# Patient Record
Sex: MALE | Race: WHITE | NOT HISPANIC OR LATINO | Employment: OTHER | URBAN - METROPOLITAN AREA
[De-identification: names, ages, dates, MRNs, and addresses within clinical notes are randomized per-mention and may not be internally consistent; named-entity substitution may affect disease eponyms.]

---

## 2017-05-03 ENCOUNTER — OFFICE VISIT (OUTPATIENT)
Dept: ORTHOPEDIC SURGERY | Facility: CLINIC | Age: 77
End: 2017-05-03

## 2017-05-03 DIAGNOSIS — M17.11 PRIMARY OSTEOARTHRITIS OF RIGHT KNEE: ICD-10-CM

## 2017-05-03 DIAGNOSIS — R52 PAIN: Primary | ICD-10-CM

## 2017-05-03 PROCEDURE — 20610 DRAIN/INJ JOINT/BURSA W/O US: CPT | Performed by: ORTHOPAEDIC SURGERY

## 2017-05-03 PROCEDURE — 99214 OFFICE O/P EST MOD 30 MIN: CPT | Performed by: ORTHOPAEDIC SURGERY

## 2017-05-03 RX ORDER — SERTRALINE HYDROCHLORIDE 25 MG/1
TABLET, FILM COATED ORAL
Refills: 0 | COMMUNITY
Start: 2017-03-20 | End: 2017-06-13

## 2017-05-03 RX ORDER — BETAMETHASONE SODIUM PHOSPHATE AND BETAMETHASONE ACETATE 3; 3 MG/ML; MG/ML
6 INJECTION, SUSPENSION INTRA-ARTICULAR; INTRALESIONAL; INTRAMUSCULAR; SOFT TISSUE
Status: COMPLETED | OUTPATIENT
Start: 2017-05-03 | End: 2017-05-03

## 2017-05-03 RX ORDER — LIDOCAINE HYDROCHLORIDE 10 MG/ML
8 INJECTION, SOLUTION INFILTRATION; PERINEURAL
Status: COMPLETED | OUTPATIENT
Start: 2017-05-03 | End: 2017-05-03

## 2017-05-03 RX ORDER — DONEPEZIL HYDROCHLORIDE 10 MG/1
TABLET, FILM COATED ORAL
Refills: 5 | COMMUNITY
Start: 2017-04-07 | End: 2017-06-13

## 2017-05-03 RX ADMIN — BETAMETHASONE SODIUM PHOSPHATE AND BETAMETHASONE ACETATE 6 MG: 3; 3 INJECTION, SUSPENSION INTRA-ARTICULAR; INTRALESIONAL; INTRAMUSCULAR; SOFT TISSUE at 09:18

## 2017-05-03 RX ADMIN — LIDOCAINE HYDROCHLORIDE 8 ML: 10 INJECTION, SOLUTION INFILTRATION; PERINEURAL at 09:18

## 2017-05-18 ENCOUNTER — ANESTHESIA EVENT (OUTPATIENT)
Dept: PERIOP | Facility: HOSPITAL | Age: 77
End: 2017-05-18

## 2017-05-19 ENCOUNTER — ON CAMPUS - OUTPATIENT (OUTPATIENT)
Dept: URBAN - METROPOLITAN AREA HOSPITAL 28 | Facility: HOSPITAL | Age: 77
End: 2017-05-19

## 2017-05-19 ENCOUNTER — HOSPITAL ENCOUNTER (OUTPATIENT)
Facility: HOSPITAL | Age: 77
Setting detail: HOSPITAL OUTPATIENT SURGERY
Discharge: HOME | End: 2017-05-19
Attending: INTERNAL MEDICINE | Admitting: INTERNAL MEDICINE

## 2017-05-19 ENCOUNTER — PREP FOR SURGERY (OUTPATIENT)
Dept: GASTROENTEROLOGY | Facility: HOSPITAL | Age: 77
End: 2017-05-19

## 2017-05-19 ENCOUNTER — ANESTHESIA (OUTPATIENT)
Dept: PERIOP | Facility: HOSPITAL | Age: 77
End: 2017-05-19

## 2017-05-19 VITALS
WEIGHT: 196.5 LBS | TEMPERATURE: 97 F | DIASTOLIC BLOOD PRESSURE: 76 MMHG | RESPIRATION RATE: 16 BRPM | HEIGHT: 71 IN | BODY MASS INDEX: 27.51 KG/M2 | OXYGEN SATURATION: 98 % | HEART RATE: 62 BPM | SYSTOLIC BLOOD PRESSURE: 112 MMHG

## 2017-05-19 DIAGNOSIS — K55.20 ANGIODYSPLASIA OF COLON WITHOUT HEMORRHAGE: ICD-10-CM

## 2017-05-19 DIAGNOSIS — K57.30 DIVERTICULOSIS OF LARGE INTESTINE WITHOUT PERFORATION OR ABS: ICD-10-CM

## 2017-05-19 DIAGNOSIS — K92.1 MELENA: ICD-10-CM

## 2017-05-19 DIAGNOSIS — M17.11 PRIMARY OSTEOARTHRITIS OF RIGHT KNEE: Primary | ICD-10-CM

## 2017-05-19 LAB — GLUCOSE BLDC GLUCOMTR-MCNC: 133 MG/DL (ref 70–130)

## 2017-05-19 PROCEDURE — 25010000002 PROPOFOL 10 MG/ML EMULSION: Performed by: NURSE ANESTHETIST, CERTIFIED REGISTERED

## 2017-05-19 PROCEDURE — 45378 DIAGNOSTIC COLONOSCOPY: CPT

## 2017-05-19 PROCEDURE — 82962 GLUCOSE BLOOD TEST: CPT

## 2017-05-19 RX ORDER — LIDOCAINE HYDROCHLORIDE 10 MG/ML
INJECTION, SOLUTION EPIDURAL; INFILTRATION; INTRACAUDAL; PERINEURAL
Status: COMPLETED
Start: 2017-05-19 | End: 2017-05-19

## 2017-05-19 RX ORDER — PROPOFOL 10 MG/ML
VIAL (ML) INTRAVENOUS AS NEEDED
Status: DISCONTINUED | OUTPATIENT
Start: 2017-05-19 | End: 2017-05-19 | Stop reason: SURG

## 2017-05-19 RX ORDER — SODIUM CHLORIDE, SODIUM LACTATE, POTASSIUM CHLORIDE, CALCIUM CHLORIDE 600; 310; 30; 20 MG/100ML; MG/100ML; MG/100ML; MG/100ML
9 INJECTION, SOLUTION INTRAVENOUS CONTINUOUS
Status: DISCONTINUED | OUTPATIENT
Start: 2017-05-19 | End: 2017-05-19 | Stop reason: HOSPADM

## 2017-05-19 RX ORDER — LIDOCAINE HYDROCHLORIDE 10 MG/ML
0.5 INJECTION, SOLUTION EPIDURAL; INFILTRATION; INTRACAUDAL; PERINEURAL ONCE AS NEEDED
Status: COMPLETED | OUTPATIENT
Start: 2017-05-19 | End: 2017-05-19

## 2017-05-19 RX ORDER — MAGNESIUM HYDROXIDE 1200 MG/15ML
LIQUID ORAL AS NEEDED
Status: DISCONTINUED | OUTPATIENT
Start: 2017-05-19 | End: 2017-05-19 | Stop reason: HOSPADM

## 2017-05-19 RX ORDER — LIDOCAINE HYDROCHLORIDE 20 MG/ML
INJECTION, SOLUTION INFILTRATION; PERINEURAL AS NEEDED
Status: DISCONTINUED | OUTPATIENT
Start: 2017-05-19 | End: 2017-05-19 | Stop reason: SURG

## 2017-05-19 RX ORDER — SODIUM CHLORIDE 0.9 % (FLUSH) 0.9 %
1-10 SYRINGE (ML) INJECTION AS NEEDED
Status: CANCELLED | OUTPATIENT
Start: 2017-05-19

## 2017-05-19 RX ORDER — GLYCOPYRROLATE 0.2 MG/ML
INJECTION INTRAMUSCULAR; INTRAVENOUS AS NEEDED
Status: DISCONTINUED | OUTPATIENT
Start: 2017-05-19 | End: 2017-05-19 | Stop reason: SURG

## 2017-05-19 RX ORDER — SODIUM CHLORIDE 0.9 % (FLUSH) 0.9 %
1-10 SYRINGE (ML) INJECTION AS NEEDED
Status: DISCONTINUED | OUTPATIENT
Start: 2017-05-19 | End: 2017-05-19 | Stop reason: HOSPADM

## 2017-05-19 RX ADMIN — PROPOFOL 50 MG: 10 INJECTION, EMULSION INTRAVENOUS at 09:56

## 2017-05-19 RX ADMIN — SODIUM CHLORIDE, POTASSIUM CHLORIDE, SODIUM LACTATE AND CALCIUM CHLORIDE: 600; 310; 30; 20 INJECTION, SOLUTION INTRAVENOUS at 09:16

## 2017-05-19 RX ADMIN — PROPOFOL 50 MG: 10 INJECTION, EMULSION INTRAVENOUS at 10:01

## 2017-05-19 RX ADMIN — PROPOFOL 20 MG: 10 INJECTION, EMULSION INTRAVENOUS at 10:06

## 2017-05-19 RX ADMIN — SODIUM CHLORIDE, POTASSIUM CHLORIDE, SODIUM LACTATE AND CALCIUM CHLORIDE 9 ML/HR: 600; 310; 30; 20 INJECTION, SOLUTION INTRAVENOUS at 09:20

## 2017-05-19 RX ADMIN — LIDOCAINE HYDROCHLORIDE 100 MG: 20 INJECTION, SOLUTION INFILTRATION; PERINEURAL at 09:53

## 2017-05-19 RX ADMIN — GLYCOPYRROLATE 0.2 MG: 0.2 INJECTION INTRAMUSCULAR; INTRAVENOUS at 09:53

## 2017-05-19 RX ADMIN — LIDOCAINE HYDROCHLORIDE 0.5 ML: 10 INJECTION, SOLUTION EPIDURAL; INFILTRATION; INTRACAUDAL; PERINEURAL at 09:21

## 2017-05-19 RX ADMIN — PROPOFOL 100 MG: 10 INJECTION, EMULSION INTRAVENOUS at 09:54

## 2017-06-01 ENCOUNTER — OFFICE VISIT (OUTPATIENT)
Dept: ORTHOPEDIC SURGERY | Facility: CLINIC | Age: 77
End: 2017-06-01

## 2017-06-01 DIAGNOSIS — R52 PAIN: Primary | ICD-10-CM

## 2017-06-01 DIAGNOSIS — M17.11 PRIMARY OSTEOARTHRITIS OF RIGHT KNEE: ICD-10-CM

## 2017-06-01 PROCEDURE — 73565 X-RAY EXAM OF KNEES: CPT | Performed by: ORTHOPAEDIC SURGERY

## 2017-06-01 PROCEDURE — 99214 OFFICE O/P EST MOD 30 MIN: CPT | Performed by: ORTHOPAEDIC SURGERY

## 2017-06-01 NOTE — PROGRESS NOTES
Subjective: Right knee pain     Patient ID: Zach Gutierrez is a 76 y.o. male.    Chief Complaint:    History of Present Illness 76-year-old male is seen for recurrent and persistent right knee pain that he's had for many years that is remained refractory to treatment to date which has consisted of anti-inflammatories cortisone injection viscus supplement injections modification of activity and bracing.  Patient is seen today to discuss total joint replacement.       Social History     Occupational History   • Not on file.     Social History Main Topics   • Smoking status: Never Smoker   • Smokeless tobacco: Never Used   • Alcohol use No   • Drug use: No   • Sexual activity: Defer      Review of Systems   Constitutional: Negative for chills, diaphoresis, fever and unexpected weight change.   HENT: Negative for hearing loss, nosebleeds, sore throat and tinnitus.    Eyes: Negative for pain and visual disturbance.   Respiratory: Negative for cough, shortness of breath and wheezing.    Cardiovascular: Negative for chest pain and palpitations.   Gastrointestinal: Negative for abdominal pain, diarrhea, nausea and vomiting.   Endocrine: Negative for cold intolerance, heat intolerance and polydipsia.   Genitourinary: Negative for difficulty urinating, dysuria and hematuria.   Musculoskeletal: Positive for arthralgias and myalgias. Negative for joint swelling.   Skin: Negative for rash and wound.   Allergic/Immunologic: Negative for environmental allergies.   Neurological: Negative for dizziness, syncope and numbness.   Hematological: Does not bruise/bleed easily.   Psychiatric/Behavioral: Negative for dysphoric mood and sleep disturbance. The patient is not nervous/anxious.    All other systems reviewed and are negative.        Past Medical History:   Diagnosis Date   • Arthritis    • Diabetes    • Knee sprain    • ZENA (obstructive sleep apnea)      Past Surgical History:   Procedure Laterality Date   • COLONOSCOPY N/A  5/19/2017    Procedure: COLONOSCOPY;  Surgeon: Srikanth Payan MD;  Location: [facility];  Service:    • HERNIA REPAIR Bilateral    • SHOULDER SURGERY  2007     No family history on file.      Objective:  There were no vitals filed for this visit.  There were no vitals filed for this visit.  There is no height or weight on file to calculate BMI.       Ortho Exam  bilateral knee x-rays are done to evaluate his complaints as the right knee is more symptomatic than the left an x-ray the right knee shows end-stage degenerative arthritis in all 3 compartments.  The left knee shows moderate degenerative changes particularly in the patellofemoral medial compartment no prior x-rays available at this time for comparison.  On exam is alert and oriented ×3.  Head is eyes nose and throat are normocephalic pupils are equally round and reactive to light sclerae clear.  The right leg shows no motor deficit good distal pulses.  No sensory loss.  The skin is cool to touch.  The knee has no effusion but there is moderate to severe crepitus with range of motion which is 0-125°.  He does have a very mild varus deformity.  His calf is nontender and supple to negative Homans.  His quad is +5 over 5.  He's been taken nonsteroidal anti-inflammatories with no GI side effects but no decrease in his pain and/or discomfort.    Assessment:       1. Pain    2. Primary osteoarthritis of right knee          Plan:      reviewed the x-rays findings with the patient and his wife.  He also had a lengthy discussion regarding total knee replacement.  Had models in the room showing an arthritic knee and in total knee.  We discussed the risks which include but not limited to an infection particularly diabetic as he is wearing and requiring multiple operations including implant removal to eradicate infection, foot drop, vascular injury, periprosthetic fracture, the need for revision surgery, and the fact that 10-15% still a pain and discomfort  despite a well implanted knee.  Emphasize even his blood sugar tight control to help decrease the risk of infection.  Discussed the rehabilitation which is 3 months to a year and the importance of physical therapy.  He understands and wishes proceed ASAP.  Surgery tenderness scheduled for June 20 pending prep evaluation.  Patient was given literature to take him to review.      Work Status:    LATRELL query complete.    Orders:  Orders Placed This Encounter   Procedures   • XR Knee 3 View Bilateral       Medications:  No orders of the defined types were placed in this encounter.      Followup:  Return in about 18 days (around 6/19/2017).          Dragon transcription disclaimer     Much of this encounter note is an electronic transcription/translation of spoken language to printed text. The electronic translation of spoken language may permit erroneous, or at times, nonsensical words or phrases to be inadvertently transcribed. Although I have reviewed the note for such errors, some may still exist.

## 2017-06-02 RX ORDER — ACETAMINOPHEN 10 MG/ML
1000 INJECTION, SOLUTION INTRAVENOUS ONCE
Status: CANCELLED | OUTPATIENT
Start: 2017-06-20

## 2017-06-02 RX ORDER — MELOXICAM 15 MG/1
15 TABLET ORAL ONCE
Status: CANCELLED | OUTPATIENT
Start: 2017-06-20

## 2017-06-02 RX ORDER — OXYCODONE HCL 10 MG/1
10 TABLET, FILM COATED, EXTENDED RELEASE ORAL ONCE
Status: CANCELLED | OUTPATIENT
Start: 2017-06-20

## 2017-06-02 RX ORDER — PREGABALIN 75 MG/1
150 CAPSULE ORAL ONCE
Status: CANCELLED | OUTPATIENT
Start: 2017-06-02 | End: 2017-06-02

## 2017-06-02 RX ORDER — CEFAZOLIN SODIUM 2 G/100ML
2 INJECTION, SOLUTION INTRAVENOUS ONCE
Status: CANCELLED | OUTPATIENT
Start: 2017-06-20

## 2017-06-12 ENCOUNTER — PREP FOR SURGERY (OUTPATIENT)
Dept: OTHER | Facility: HOSPITAL | Age: 77
End: 2017-06-12

## 2017-06-12 DIAGNOSIS — E23.2 DIABETES INSIPIDUS (HCC): Primary | ICD-10-CM

## 2017-06-13 ENCOUNTER — APPOINTMENT (OUTPATIENT)
Dept: PREADMISSION TESTING | Facility: HOSPITAL | Age: 77
End: 2017-06-13

## 2017-06-13 ENCOUNTER — HOSPITAL ENCOUNTER (OUTPATIENT)
Dept: GENERAL RADIOLOGY | Facility: HOSPITAL | Age: 77
Discharge: HOME | End: 2017-06-13
Admitting: ORTHOPAEDIC SURGERY

## 2017-06-13 VITALS
WEIGHT: 202.2 LBS | DIASTOLIC BLOOD PRESSURE: 71 MMHG | BODY MASS INDEX: 28.31 KG/M2 | HEIGHT: 71 IN | OXYGEN SATURATION: 94 % | HEART RATE: 84 BPM | RESPIRATION RATE: 16 BRPM | SYSTOLIC BLOOD PRESSURE: 122 MMHG

## 2017-06-13 DIAGNOSIS — E23.2 DIABETES INSIPIDUS (HCC): Primary | ICD-10-CM

## 2017-06-13 DIAGNOSIS — E23.2 DIABETES INSIPIDUS (HCC): ICD-10-CM

## 2017-06-13 LAB
ABO GROUP BLD: NORMAL
ABO GROUP BLD: NORMAL
ANION GAP SERPL CALCULATED.3IONS-SCNC: 15.2 MMOL/L
BACTERIA UR QL AUTO: ABNORMAL /HPF
BASOPHILS # BLD AUTO: 0.03 10*3/MM3 (ref 0–0.2)
BASOPHILS NFR BLD AUTO: 0.4 % (ref 0–2)
BILIRUB UR QL STRIP: ABNORMAL
BLD GP AB SCN SERPL QL: NEGATIVE
BUN BLD-MCNC: 19 MG/DL (ref 8–23)
BUN/CREAT SERPL: 27.1 (ref 7–25)
CALCIUM SPEC-SCNC: 8.2 MG/DL (ref 8.8–10.5)
CHLORIDE SERPL-SCNC: 101 MMOL/L (ref 98–107)
CLARITY UR: CLEAR
CO2 SERPL-SCNC: 20.8 MMOL/L (ref 22–29)
COLOR UR: YELLOW
CREAT BLD-MCNC: 0.7 MG/DL (ref 0.76–1.27)
DEPRECATED RDW RBC AUTO: 39.7 FL (ref 37–54)
EOSINOPHIL # BLD AUTO: 0.02 10*3/MM3 (ref 0.1–0.3)
EOSINOPHIL NFR BLD AUTO: 0.3 % (ref 0–4)
ERYTHROCYTE [DISTWIDTH] IN BLOOD BY AUTOMATED COUNT: 12.2 % (ref 11.5–14.5)
GFR SERPL CREATININE-BSD FRML MDRD: 110 ML/MIN/1.73
GLUCOSE BLD-MCNC: 149 MG/DL (ref 65–99)
GLUCOSE UR STRIP-MCNC: NEGATIVE MG/DL
HCT VFR BLD AUTO: 41.1 % (ref 42–52)
HGB BLD-MCNC: 14.7 G/DL (ref 14–18)
HGB UR QL STRIP.AUTO: ABNORMAL
HYALINE CASTS UR QL AUTO: ABNORMAL /LPF
IMM GRANULOCYTES # BLD: 0.02 10*3/MM3 (ref 0–0.03)
IMM GRANULOCYTES NFR BLD: 0.3 % (ref 0–0.5)
INR PPP: 1.03 (ref 0.9–1.1)
KETONES UR QL STRIP: ABNORMAL
LEUKOCYTE ESTERASE UR QL STRIP.AUTO: NEGATIVE
LYMPHOCYTES # BLD AUTO: 0.38 10*3/MM3 (ref 0.6–4.8)
LYMPHOCYTES NFR BLD AUTO: 4.9 % (ref 20–45)
MCH RBC QN AUTO: 32.1 PG (ref 27–31)
MCHC RBC AUTO-ENTMCNC: 35.8 G/DL (ref 31–37)
MCV RBC AUTO: 89.7 FL (ref 80–94)
MONOCYTES # BLD AUTO: 0.29 10*3/MM3 (ref 0–1)
MONOCYTES NFR BLD AUTO: 3.8 % (ref 3–8)
MUCOUS THREADS URNS QL MICRO: ABNORMAL /HPF
NEUTROPHILS # BLD AUTO: 6.94 10*3/MM3 (ref 1.5–8.3)
NEUTROPHILS NFR BLD AUTO: 90.3 % (ref 45–70)
NITRITE UR QL STRIP: NEGATIVE
NRBC BLD MANUAL-RTO: 0 /100 WBC (ref 0–0)
PH UR STRIP.AUTO: <=5 [PH] (ref 4.5–8)
PLATELET # BLD AUTO: 211 10*3/MM3 (ref 140–500)
PMV BLD AUTO: 9.1 FL (ref 7.4–10.4)
POTASSIUM BLD-SCNC: 3.9 MMOL/L (ref 3.5–5.2)
PROT UR QL STRIP: ABNORMAL
PROTHROMBIN TIME: 13.5 SECONDS (ref 12.1–15)
RBC # BLD AUTO: 4.58 10*6/MM3 (ref 4.7–6.1)
RBC # UR: ABNORMAL /HPF
REF LAB TEST METHOD: ABNORMAL
RH BLD: POSITIVE
RH BLD: POSITIVE
SODIUM BLD-SCNC: 137 MMOL/L (ref 136–145)
SP GR UR STRIP: 1.03 (ref 1–1.03)
SQUAMOUS #/AREA URNS HPF: ABNORMAL /HPF
UROBILINOGEN UR QL STRIP: ABNORMAL
WBC NRBC COR # BLD: 7.68 10*3/MM3 (ref 4.8–10.8)
WBC UR QL AUTO: ABNORMAL /HPF

## 2017-06-13 PROCEDURE — 85610 PROTHROMBIN TIME: CPT | Performed by: ORTHOPAEDIC SURGERY

## 2017-06-13 PROCEDURE — 86900 BLOOD TYPING SEROLOGIC ABO: CPT

## 2017-06-13 PROCEDURE — 86901 BLOOD TYPING SEROLOGIC RH(D): CPT | Performed by: ORTHOPAEDIC SURGERY

## 2017-06-13 PROCEDURE — 80048 BASIC METABOLIC PNL TOTAL CA: CPT | Performed by: ORTHOPAEDIC SURGERY

## 2017-06-13 PROCEDURE — 71020 HC CHEST PA AND LATERAL: CPT

## 2017-06-13 PROCEDURE — 86850 RBC ANTIBODY SCREEN: CPT | Performed by: ORTHOPAEDIC SURGERY

## 2017-06-13 PROCEDURE — 93005 ELECTROCARDIOGRAM TRACING: CPT

## 2017-06-13 PROCEDURE — 81001 URINALYSIS AUTO W/SCOPE: CPT | Performed by: ORTHOPAEDIC SURGERY

## 2017-06-13 PROCEDURE — 86901 BLOOD TYPING SEROLOGIC RH(D): CPT

## 2017-06-13 PROCEDURE — 85025 COMPLETE CBC W/AUTO DIFF WBC: CPT | Performed by: ORTHOPAEDIC SURGERY

## 2017-06-13 PROCEDURE — 86900 BLOOD TYPING SEROLOGIC ABO: CPT | Performed by: ORTHOPAEDIC SURGERY

## 2017-06-13 PROCEDURE — 93010 ELECTROCARDIOGRAM REPORT: CPT | Performed by: INTERNAL MEDICINE

## 2017-06-13 PROCEDURE — 87081 CULTURE SCREEN ONLY: CPT | Performed by: ORTHOPAEDIC SURGERY

## 2017-06-13 PROCEDURE — 36415 COLL VENOUS BLD VENIPUNCTURE: CPT

## 2017-06-13 RX ORDER — MEMANTINE HYDROCHLORIDE 28 MG/1
28 CAPSULE, EXTENDED RELEASE ORAL NIGHTLY
COMMUNITY

## 2017-06-13 RX ORDER — TAMSULOSIN HYDROCHLORIDE 0.4 MG/1
1 CAPSULE ORAL NIGHTLY
COMMUNITY
End: 2017-10-04

## 2017-06-13 RX ORDER — SERTRALINE HYDROCHLORIDE 25 MG/1
25 TABLET, FILM COATED ORAL EVERY MORNING
COMMUNITY
End: 2019-10-06 | Stop reason: HOSPADM

## 2017-06-13 RX ORDER — DONEPEZIL HYDROCHLORIDE 10 MG/1
10 TABLET, FILM COATED ORAL NIGHTLY
COMMUNITY

## 2017-06-13 RX ORDER — FINASTERIDE 5 MG/1
5 TABLET, FILM COATED ORAL NIGHTLY
COMMUNITY

## 2017-06-13 NOTE — PAT
Pt and wife here for PAT/joint camp.  Joint camp binder given and reviewed in class.  Pre-op tests completed, chg soap given, and instructions reviewed.  Instructed to bring cpap day of surgery, voiced understanding.  Pt w/dementia. Getting medical clearance. Will need accu-check day of surgery.

## 2017-06-15 LAB — MRSA SPEC QL CULT: NORMAL

## 2017-06-19 ENCOUNTER — OFFICE VISIT (OUTPATIENT)
Dept: ORTHOPEDIC SURGERY | Facility: CLINIC | Age: 77
End: 2017-06-19

## 2017-06-19 ENCOUNTER — ANESTHESIA EVENT (OUTPATIENT)
Dept: PERIOP | Facility: HOSPITAL | Age: 77
End: 2017-06-19

## 2017-06-19 DIAGNOSIS — R52 PAIN: ICD-10-CM

## 2017-06-19 DIAGNOSIS — M17.11 PRIMARY OSTEOARTHRITIS OF RIGHT KNEE: Primary | ICD-10-CM

## 2017-06-19 PROCEDURE — S0260 H&P FOR SURGERY: HCPCS | Performed by: ORTHOPAEDIC SURGERY

## 2017-06-19 NOTE — H&P
Orthopedic Surgery    Patient Care Team:  Jessica Mac MD as PCP - General  Jessica Mac MD as PCP - Family Medicine  Jessica Mac MD as PCP - Claims Attributed    CHIEF COMPLAINT: Right knee pain    HISTORY OF PRESENT ILLNESS: 76-year-old male with end-stage degenerative arthritis of the right knee which has remained refractory to nonoperative management is being admitted this time to undergo right total knee arthroplasty patient fell respond to nonsteroidal anti-inflammatories, cortisone injections, viscus supplement injections, modification of activity and exercise program.  Have discussed with the patient in detail the risk of surgery which include but not limited to action knee for multiple procedures including implant removal to eradicate infection, DVT, foot drop, vascular injury, pre- prosthetic fracture, the need for revision surgery and the fact that 10-15% still a pain and discomfort despite a well implanted total knee.  Discussed the rehabilitation which is also 3 21 year.  He understands and agrees to proceed and is been cleared by his family physician.          Past Medical History:   Diagnosis Date   • Arthritis    • Dementia    • Diabetes    • Knee sprain    • ZENA (obstructive sleep apnea)     cpap     Past Surgical History:   Procedure Laterality Date   • COLONOSCOPY N/A 5/19/2017    Procedure: COLONOSCOPY;  Surgeon: Srikanth Payan MD;  Location: [facility];  Service:    • HERNIA REPAIR Bilateral     inguinal   • SHOULDER SURGERY Right 2007    RCR     No family history on file.  Social History   Substance Use Topics   • Smoking status: Never Smoker   • Smokeless tobacco: Never Used   • Alcohol use No     No prescriptions prior to admission.     Allergies:  Review of patient's allergies indicates no known allergies.    REVIEW OF SYSTEMS:  Please see the above history of present illness for pertinent positives and negatives.  The remainder of the patient's systems have been  reviewed and are negative.    Vital Signs            Physical Exam:  Physical Exam   Constitutional: Patient appears well-developed and well-nourished and in no acute distress   HEENT:   Head: Normocephalic and atraumatic.   Eyes:  Pupils are equal, round, and reactive to light.  Mouth and Throat: Patient has moist mucous membranes. Oropharynx is clear of any erythema or exudate.     Neck: Neck supple. No JVD present. No thyromegaly present. No lymphadenopathy present.  Cardiovascular: Regular rate, regular rhythm.  Pulmonary/Chest: Lungs are clear to auscultation bilaterally.  Abdominal:benign,soft with bowel sounds  Musculoskeletal: Normal posture.  Extremities: Right leg shows good distal pulses no motor deficit no sensory loss.  There is a mild varus deformity to the knee.  There is marked crepitus with range of motion which is 0 125°.  His calf is supple to negative Homans.    Neurological: Patient is alert and oriented.  Psychological:   Mood and behavior appropriate.  Skin: Skin is warm and dry.     Results Review:    I reviewed the patient's new clinical results.  Lab Results (most recent)     None          Imaging Results (most recent)     None            ECG/EMG Results (most recent)     None          Assessment/Plan osteoarthritis right knee        I discussed the patients findings and my recommendations with patient and plan to proceed with right total knee arthroplasty    Bj Thrasher MD  06/19/17  1:27 PM

## 2017-06-19 NOTE — PROGRESS NOTES
Subjective: Right knee pain     Patient ID: Zach Gutierrez is a 76 y.o. male.    Chief Complaint:    History of Present Illness 9-year-old male with end-stage degenerative arthritis of his right knee seen for H&P done undergo right total knee arthroplasty tomorrow       Social History     Occupational History   • Not on file.     Social History Main Topics   • Smoking status: Never Smoker   • Smokeless tobacco: Never Used   • Alcohol use No   • Drug use: No   • Sexual activity: Defer      Review of Systems   Constitutional: Negative for chills, diaphoresis, fever and unexpected weight change.   HENT: Negative for hearing loss, nosebleeds, sore throat and tinnitus.    Eyes: Negative for pain and visual disturbance.   Respiratory: Negative for cough, shortness of breath and wheezing.    Cardiovascular: Negative for chest pain and palpitations.   Gastrointestinal: Negative for abdominal pain, diarrhea, nausea and vomiting.   Endocrine: Negative for cold intolerance, heat intolerance and polydipsia.   Genitourinary: Negative for difficulty urinating, dysuria and hematuria.   Musculoskeletal: Positive for arthralgias. Negative for joint swelling and myalgias.   Skin: Negative for rash and wound.   Allergic/Immunologic: Negative for environmental allergies.   Neurological: Negative for dizziness, syncope and numbness.   Hematological: Does not bruise/bleed easily.   Psychiatric/Behavioral: Negative for dysphoric mood and sleep disturbance. The patient is not nervous/anxious.    All other systems reviewed and are negative.        Past Medical History:   Diagnosis Date   • Arthritis    • Dementia    • Diabetes    • Knee sprain    • ZENA (obstructive sleep apnea)     cpap     Past Surgical History:   Procedure Laterality Date   • COLONOSCOPY N/A 5/19/2017    Procedure: COLONOSCOPY;  Surgeon: Srikanth Payan MD;  Location: [facility];  Service:    • HERNIA REPAIR Bilateral     inguinal   • SHOULDER SURGERY Right 2007     RCR     No family history on file.      Objective:  There were no vitals filed for this visit.  There were no vitals filed for this visit.  There is no height or weight on file to calculate BMI.       Ortho Exam  H&P completed Assessment:       1. Primary osteoarthritis of right knee    2. Pain          Plan:      QUESTIONS regarding the surgery and postop care answered      Work Status:    LATRELL query complete.    Orders:  No orders of the defined types were placed in this encounter.      Medications:  No orders of the defined types were placed in this encounter.      Followup:  Return in about 2 weeks (around 7/3/2017).          Dragon transcription disclaimer     Much of this encounter note is an electronic transcription/translation of spoken language to printed text. The electronic translation of spoken language may permit erroneous, or at times, nonsensical words or phrases to be inadvertently transcribed. Although I have reviewed the note for such errors, some may still exist.

## 2017-06-20 ENCOUNTER — APPOINTMENT (OUTPATIENT)
Dept: GENERAL RADIOLOGY | Facility: HOSPITAL | Age: 77
End: 2017-06-20

## 2017-06-20 ENCOUNTER — ANESTHESIA (OUTPATIENT)
Dept: PERIOP | Facility: HOSPITAL | Age: 77
End: 2017-06-20

## 2017-06-20 ENCOUNTER — HOSPITAL ENCOUNTER (INPATIENT)
Facility: HOSPITAL | Age: 77
LOS: 2 days | Discharge: HOME HEALTH | End: 2017-06-22
Attending: ORTHOPAEDIC SURGERY | Admitting: ORTHOPAEDIC SURGERY

## 2017-06-20 DIAGNOSIS — M17.11 PRIMARY OSTEOARTHRITIS OF RIGHT KNEE: ICD-10-CM

## 2017-06-20 LAB
GLUCOSE BLDC GLUCOMTR-MCNC: 124 MG/DL (ref 70–130)
GLUCOSE BLDC GLUCOMTR-MCNC: 176 MG/DL (ref 70–130)
GLUCOSE BLDC GLUCOMTR-MCNC: 180 MG/DL (ref 70–130)

## 2017-06-20 PROCEDURE — 73560 X-RAY EXAM OF KNEE 1 OR 2: CPT

## 2017-06-20 PROCEDURE — 25010000002 PROPOFOL 10 MG/ML EMULSION: Performed by: NURSE ANESTHETIST, CERTIFIED REGISTERED

## 2017-06-20 PROCEDURE — 25010000002 FENTANYL CITRATE (PF) 100 MCG/2ML SOLUTION

## 2017-06-20 PROCEDURE — 25010000002 ROPIVACAINE PER 1 MG: Performed by: NURSE ANESTHETIST, CERTIFIED REGISTERED

## 2017-06-20 PROCEDURE — 25010000003 CEFAZOLIN IN DEXTROSE 2-4 GM/100ML-% SOLUTION: Performed by: ORTHOPAEDIC SURGERY

## 2017-06-20 PROCEDURE — C1776 JOINT DEVICE (IMPLANTABLE): HCPCS | Performed by: ORTHOPAEDIC SURGERY

## 2017-06-20 PROCEDURE — 94799 UNLISTED PULMONARY SVC/PX: CPT

## 2017-06-20 PROCEDURE — C1713 ANCHOR/SCREW BN/BN,TIS/BN: HCPCS | Performed by: ORTHOPAEDIC SURGERY

## 2017-06-20 PROCEDURE — L1830 KO IMMOB CANVAS LONG PRE OTS: HCPCS | Performed by: ORTHOPAEDIC SURGERY

## 2017-06-20 PROCEDURE — 25010000002 MIDAZOLAM PER 1 MG

## 2017-06-20 PROCEDURE — 25010000003 CEFAZOLIN PER 500 MG: Performed by: ORTHOPAEDIC SURGERY

## 2017-06-20 PROCEDURE — 63710000001 INSULIN ASPART PER 5 UNITS: Performed by: INTERNAL MEDICINE

## 2017-06-20 PROCEDURE — 97162 PT EVAL MOD COMPLEX 30 MIN: CPT

## 2017-06-20 PROCEDURE — 82962 GLUCOSE BLOOD TEST: CPT

## 2017-06-20 PROCEDURE — 20985 CPTR-ASST DIR MS PX: CPT | Performed by: ORTHOPAEDIC SURGERY

## 2017-06-20 PROCEDURE — 99232 SBSQ HOSP IP/OBS MODERATE 35: CPT | Performed by: INTERNAL MEDICINE

## 2017-06-20 PROCEDURE — 25010000002 ONDANSETRON PER 1 MG

## 2017-06-20 PROCEDURE — 0SRC0J9 REPLACEMENT OF RIGHT KNEE JOINT WITH SYNTHETIC SUBSTITUTE, CEMENTED, OPEN APPROACH: ICD-10-PCS | Performed by: ORTHOPAEDIC SURGERY

## 2017-06-20 PROCEDURE — 27447 TOTAL KNEE ARTHROPLASTY: CPT | Performed by: ORTHOPAEDIC SURGERY

## 2017-06-20 DEVICE — TOTL KN HI DEMAND STRYKER: Type: IMPLANTABLE DEVICE | Site: KNEE | Status: FUNCTIONAL

## 2017-06-20 DEVICE — SMARTSET GMV HIGH PERFORMANCE GENTAMICIN MEDIUM VISCOSITY BONE CEMENT 40G
Type: IMPLANTABLE DEVICE | Site: KNEE | Status: FUNCTIONAL
Brand: SMARTSET

## 2017-06-20 DEVICE — COMP FEM TRIATH CR NO 5 RT: Type: IMPLANTABLE DEVICE | Site: KNEE | Status: FUNCTIONAL

## 2017-06-20 DEVICE — BASEPLT TIB TRIATH CMT NO5: Type: IMPLANTABLE DEVICE | Site: KNEE | Status: FUNCTIONAL

## 2017-06-20 DEVICE — INSRT TIB/KN TRIATHLON CONDY/STBL X3 SZ5 9MM: Type: IMPLANTABLE DEVICE | Site: KNEE | Status: FUNCTIONAL

## 2017-06-20 DEVICE — IMPLANTABLE DEVICE: Type: IMPLANTABLE DEVICE | Site: PATELLA | Status: FUNCTIONAL

## 2017-06-20 RX ORDER — ACETAMINOPHEN 500 MG
1000 TABLET ORAL EVERY 6 HOURS
Status: DISCONTINUED | OUTPATIENT
Start: 2017-06-20 | End: 2017-06-22 | Stop reason: HOSPADM

## 2017-06-20 RX ORDER — SODIUM CHLORIDE, SODIUM LACTATE, POTASSIUM CHLORIDE, CALCIUM CHLORIDE 600; 310; 30; 20 MG/100ML; MG/100ML; MG/100ML; MG/100ML
9 INJECTION, SOLUTION INTRAVENOUS CONTINUOUS
Status: DISCONTINUED | OUTPATIENT
Start: 2017-06-20 | End: 2017-06-20

## 2017-06-20 RX ORDER — SODIUM CHLORIDE 0.9 % (FLUSH) 0.9 %
1-10 SYRINGE (ML) INJECTION AS NEEDED
Status: DISCONTINUED | OUTPATIENT
Start: 2017-06-20 | End: 2017-06-20 | Stop reason: HOSPADM

## 2017-06-20 RX ORDER — SENNA AND DOCUSATE SODIUM 50; 8.6 MG/1; MG/1
2 TABLET, FILM COATED ORAL 2 TIMES DAILY
Status: DISCONTINUED | OUTPATIENT
Start: 2017-06-20 | End: 2017-06-22 | Stop reason: HOSPADM

## 2017-06-20 RX ORDER — ACETAMINOPHEN 10 MG/ML
1000 INJECTION, SOLUTION INTRAVENOUS ONCE
Status: COMPLETED | OUTPATIENT
Start: 2017-06-20 | End: 2017-06-20

## 2017-06-20 RX ORDER — FENTANYL CITRATE 50 UG/ML
50 INJECTION, SOLUTION INTRAMUSCULAR; INTRAVENOUS
Status: DISCONTINUED | OUTPATIENT
Start: 2017-06-20 | End: 2017-06-20 | Stop reason: HOSPADM

## 2017-06-20 RX ORDER — ONDANSETRON 2 MG/ML
INJECTION INTRAMUSCULAR; INTRAVENOUS
Status: COMPLETED
Start: 2017-06-20 | End: 2017-06-20

## 2017-06-20 RX ORDER — LIDOCAINE HYDROCHLORIDE 10 MG/ML
0.5 INJECTION, SOLUTION EPIDURAL; INFILTRATION; INTRACAUDAL; PERINEURAL ONCE AS NEEDED
Status: DISCONTINUED | OUTPATIENT
Start: 2017-06-20 | End: 2017-06-20 | Stop reason: HOSPADM

## 2017-06-20 RX ORDER — MELOXICAM 7.5 MG/1
TABLET ORAL
Status: COMPLETED
Start: 2017-06-20 | End: 2017-06-20

## 2017-06-20 RX ORDER — MIDAZOLAM HYDROCHLORIDE 1 MG/ML
1 INJECTION INTRAMUSCULAR; INTRAVENOUS
Status: DISCONTINUED | OUTPATIENT
Start: 2017-06-20 | End: 2017-06-20 | Stop reason: HOSPADM

## 2017-06-20 RX ORDER — SODIUM CHLORIDE, SODIUM LACTATE, POTASSIUM CHLORIDE, CALCIUM CHLORIDE 600; 310; 30; 20 MG/100ML; MG/100ML; MG/100ML; MG/100ML
30 INJECTION, SOLUTION INTRAVENOUS CONTINUOUS
Status: DISCONTINUED | OUTPATIENT
Start: 2017-06-20 | End: 2017-06-22 | Stop reason: HOSPADM

## 2017-06-20 RX ORDER — DONEPEZIL HYDROCHLORIDE 5 MG/1
10 TABLET, FILM COATED ORAL NIGHTLY
Status: DISCONTINUED | OUTPATIENT
Start: 2017-06-20 | End: 2017-06-22 | Stop reason: HOSPADM

## 2017-06-20 RX ORDER — DEXTROSE MONOHYDRATE 25 G/50ML
25 INJECTION, SOLUTION INTRAVENOUS
Status: DISCONTINUED | OUTPATIENT
Start: 2017-06-20 | End: 2017-06-22 | Stop reason: HOSPADM

## 2017-06-20 RX ORDER — ROPIVACAINE HYDROCHLORIDE 5 MG/ML
INJECTION, SOLUTION EPIDURAL; INFILTRATION; PERINEURAL AS NEEDED
Status: DISCONTINUED | OUTPATIENT
Start: 2017-06-20 | End: 2017-06-20 | Stop reason: SURG

## 2017-06-20 RX ORDER — CEFAZOLIN SODIUM 1 G/3ML
INJECTION, POWDER, FOR SOLUTION INTRAMUSCULAR; INTRAVENOUS
Status: DISPENSED
Start: 2017-06-20 | End: 2017-06-20

## 2017-06-20 RX ORDER — BUPIVACAINE HYDROCHLORIDE 5 MG/ML
INJECTION, SOLUTION EPIDURAL; INTRACAUDAL AS NEEDED
Status: DISCONTINUED | OUTPATIENT
Start: 2017-06-20 | End: 2017-06-20 | Stop reason: SURG

## 2017-06-20 RX ORDER — OXYCODONE HYDROCHLORIDE 5 MG/1
10 TABLET ORAL EVERY 4 HOURS PRN
Status: DISCONTINUED | OUTPATIENT
Start: 2017-06-20 | End: 2017-06-22 | Stop reason: HOSPADM

## 2017-06-20 RX ORDER — TAMSULOSIN HYDROCHLORIDE 0.4 MG/1
0.4 CAPSULE ORAL NIGHTLY
Status: DISCONTINUED | OUTPATIENT
Start: 2017-06-20 | End: 2017-06-22 | Stop reason: HOSPADM

## 2017-06-20 RX ORDER — FAMOTIDINE 10 MG/ML
INJECTION, SOLUTION INTRAVENOUS
Status: COMPLETED
Start: 2017-06-20 | End: 2017-06-20

## 2017-06-20 RX ORDER — PREGABALIN 75 MG/1
150 CAPSULE ORAL ONCE
Status: COMPLETED | OUTPATIENT
Start: 2017-06-20 | End: 2017-06-20

## 2017-06-20 RX ORDER — PANTOPRAZOLE SODIUM 40 MG/1
40 TABLET, DELAYED RELEASE ORAL
Status: DISCONTINUED | OUTPATIENT
Start: 2017-06-21 | End: 2017-06-22 | Stop reason: HOSPADM

## 2017-06-20 RX ORDER — ASPIRIN 325 MG
325 TABLET ORAL EVERY 12 HOURS
Status: DISCONTINUED | OUTPATIENT
Start: 2017-06-20 | End: 2017-06-22 | Stop reason: HOSPADM

## 2017-06-20 RX ORDER — MIDAZOLAM HYDROCHLORIDE 1 MG/ML
2 INJECTION INTRAMUSCULAR; INTRAVENOUS
Status: DISCONTINUED | OUTPATIENT
Start: 2017-06-20 | End: 2017-06-20 | Stop reason: HOSPADM

## 2017-06-20 RX ORDER — MULTIPLE VITAMINS W/ MINERALS TAB 9MG-400MCG
1 TAB ORAL DAILY
Status: DISCONTINUED | OUTPATIENT
Start: 2017-06-20 | End: 2017-06-22 | Stop reason: HOSPADM

## 2017-06-20 RX ORDER — BISACODYL 10 MG
10 SUPPOSITORY, RECTAL RECTAL DAILY PRN
Status: DISCONTINUED | OUTPATIENT
Start: 2017-06-20 | End: 2017-06-22 | Stop reason: HOSPADM

## 2017-06-20 RX ORDER — OXYCODONE HCL 10 MG/1
10 TABLET, FILM COATED, EXTENDED RELEASE ORAL ONCE
Status: COMPLETED | OUTPATIENT
Start: 2017-06-20 | End: 2017-06-20

## 2017-06-20 RX ORDER — PREGABALIN 75 MG/1
75 CAPSULE ORAL EVERY 12 HOURS SCHEDULED
Status: DISCONTINUED | OUTPATIENT
Start: 2017-06-20 | End: 2017-06-22 | Stop reason: HOSPADM

## 2017-06-20 RX ORDER — BISACODYL 5 MG/1
10 TABLET, DELAYED RELEASE ORAL DAILY PRN
Status: DISCONTINUED | OUTPATIENT
Start: 2017-06-20 | End: 2017-06-22 | Stop reason: HOSPADM

## 2017-06-20 RX ORDER — MEMANTINE HYDROCHLORIDE 5 MG/1
10 TABLET ORAL EVERY 12 HOURS SCHEDULED
Status: DISCONTINUED | OUTPATIENT
Start: 2017-06-20 | End: 2017-06-22 | Stop reason: HOSPADM

## 2017-06-20 RX ORDER — PROPOFOL 10 MG/ML
VIAL (ML) INTRAVENOUS CONTINUOUS PRN
Status: DISCONTINUED | OUTPATIENT
Start: 2017-06-20 | End: 2017-06-20 | Stop reason: SURG

## 2017-06-20 RX ORDER — ONDANSETRON 2 MG/ML
4 INJECTION INTRAMUSCULAR; INTRAVENOUS ONCE AS NEEDED
Status: COMPLETED | OUTPATIENT
Start: 2017-06-20 | End: 2017-06-20

## 2017-06-20 RX ORDER — CEFAZOLIN SODIUM 2 G/100ML
2 INJECTION, SOLUTION INTRAVENOUS ONCE
Status: COMPLETED | OUTPATIENT
Start: 2017-06-20 | End: 2017-06-20

## 2017-06-20 RX ORDER — MELOXICAM 15 MG/1
15 TABLET ORAL ONCE
Status: COMPLETED | OUTPATIENT
Start: 2017-06-20 | End: 2017-06-20

## 2017-06-20 RX ORDER — MAGNESIUM HYDROXIDE 1200 MG/15ML
LIQUID ORAL AS NEEDED
Status: DISCONTINUED | OUTPATIENT
Start: 2017-06-20 | End: 2017-06-20 | Stop reason: HOSPADM

## 2017-06-20 RX ORDER — FAMOTIDINE 10 MG/ML
20 INJECTION, SOLUTION INTRAVENOUS
Status: DISCONTINUED | OUTPATIENT
Start: 2017-06-20 | End: 2017-06-20 | Stop reason: HOSPADM

## 2017-06-20 RX ORDER — SODIUM CHLORIDE 9 MG/ML
INJECTION, SOLUTION INTRAVENOUS AS NEEDED
Status: DISCONTINUED | OUTPATIENT
Start: 2017-06-20 | End: 2017-06-20 | Stop reason: HOSPADM

## 2017-06-20 RX ORDER — BACITRACIN ZINC 500 [USP'U]/G
OINTMENT TOPICAL AS NEEDED
Status: DISCONTINUED | OUTPATIENT
Start: 2017-06-20 | End: 2017-06-20 | Stop reason: HOSPADM

## 2017-06-20 RX ORDER — MIDAZOLAM HYDROCHLORIDE 1 MG/ML
INJECTION INTRAMUSCULAR; INTRAVENOUS
Status: COMPLETED
Start: 2017-06-20 | End: 2017-06-20

## 2017-06-20 RX ORDER — NICOTINE POLACRILEX 4 MG
15 LOZENGE BUCCAL
Status: DISCONTINUED | OUTPATIENT
Start: 2017-06-20 | End: 2017-06-22 | Stop reason: HOSPADM

## 2017-06-20 RX ORDER — FINASTERIDE 5 MG/1
5 TABLET, FILM COATED ORAL NIGHTLY
Status: DISCONTINUED | OUTPATIENT
Start: 2017-06-20 | End: 2017-06-22 | Stop reason: HOSPADM

## 2017-06-20 RX ORDER — FENTANYL CITRATE 50 UG/ML
INJECTION, SOLUTION INTRAMUSCULAR; INTRAVENOUS
Status: COMPLETED
Start: 2017-06-20 | End: 2017-06-20

## 2017-06-20 RX ADMIN — Medication 1 TABLET: at 13:45

## 2017-06-20 RX ADMIN — ACETAMINOPHEN 1000 MG: 10 INJECTION, SOLUTION INTRAVENOUS at 06:44

## 2017-06-20 RX ADMIN — DONEPEZIL HYDROCHLORIDE 10 MG: 5 TABLET, FILM COATED ORAL at 20:59

## 2017-06-20 RX ADMIN — MEMANTINE 10 MG: 5 TABLET ORAL at 20:59

## 2017-06-20 RX ADMIN — ROPIVACAINE HYDROCHLORIDE 15 ML: 5 INJECTION, SOLUTION EPIDURAL; INFILTRATION; PERINEURAL at 07:15

## 2017-06-20 RX ADMIN — INSULIN ASPART 2 UNITS: 100 INJECTION, SOLUTION INTRAVENOUS; SUBCUTANEOUS at 20:59

## 2017-06-20 RX ADMIN — MELOXICAM 15 MG: 7.5 TABLET ORAL at 06:39

## 2017-06-20 RX ADMIN — PREGABALIN 150 MG: 75 CAPSULE ORAL at 06:39

## 2017-06-20 RX ADMIN — ACETAMINOPHEN 1000 MG: 500 TABLET, FILM COATED ORAL at 23:19

## 2017-06-20 RX ADMIN — METFORMIN HYDROCHLORIDE 500 MG: 500 TABLET ORAL at 13:46

## 2017-06-20 RX ADMIN — CEFAZOLIN SODIUM 2 G: 2 SOLUTION INTRAVENOUS at 16:32

## 2017-06-20 RX ADMIN — ONDANSETRON 4 MG: 2 INJECTION INTRAMUSCULAR; INTRAVENOUS at 07:10

## 2017-06-20 RX ADMIN — ACETAMINOPHEN 1000 MG: 500 TABLET, FILM COATED ORAL at 18:57

## 2017-06-20 RX ADMIN — MIDAZOLAM HYDROCHLORIDE 0.5 MG: 1 INJECTION INTRAMUSCULAR; INTRAVENOUS at 07:13

## 2017-06-20 RX ADMIN — TAMSULOSIN HYDROCHLORIDE 0.4 MG: 0.4 CAPSULE ORAL at 20:59

## 2017-06-20 RX ADMIN — CEFAZOLIN SODIUM 2 G: 2 SOLUTION INTRAVENOUS at 23:17

## 2017-06-20 RX ADMIN — MELOXICAM 15 MG: 15 TABLET ORAL at 06:39

## 2017-06-20 RX ADMIN — SODIUM CHLORIDE, POTASSIUM CHLORIDE, SODIUM LACTATE AND CALCIUM CHLORIDE: 600; 310; 30; 20 INJECTION, SOLUTION INTRAVENOUS at 07:15

## 2017-06-20 RX ADMIN — FAMOTIDINE 20 MG: 10 INJECTION, SOLUTION INTRAVENOUS at 07:10

## 2017-06-20 RX ADMIN — MEMANTINE 10 MG: 5 TABLET ORAL at 16:28

## 2017-06-20 RX ADMIN — FENTANYL CITRATE 25 MCG: 50 INJECTION, SOLUTION INTRAMUSCULAR; INTRAVENOUS at 07:12

## 2017-06-20 RX ADMIN — ACETAMINOPHEN 1000 MG: 500 TABLET, FILM COATED ORAL at 13:56

## 2017-06-20 RX ADMIN — OXYCODONE HYDROCHLORIDE 10 MG: 5 TABLET ORAL at 23:19

## 2017-06-20 RX ADMIN — OXYCODONE HYDROCHLORIDE 10 MG: 10 TABLET, FILM COATED, EXTENDED RELEASE ORAL at 06:39

## 2017-06-20 RX ADMIN — OXYCODONE HYDROCHLORIDE 10 MG: 5 TABLET ORAL at 13:43

## 2017-06-20 RX ADMIN — FAMOTIDINE 20 MG: 10 INJECTION INTRAVENOUS at 07:10

## 2017-06-20 RX ADMIN — DOCUSATE SODIUM AND SENNOSIDES 2 TABLET: 8.6; 5 TABLET, FILM COATED ORAL at 13:44

## 2017-06-20 RX ADMIN — ONDANSETRON 4 MG: 2 INJECTION, SOLUTION INTRAMUSCULAR; INTRAVENOUS at 07:10

## 2017-06-20 RX ADMIN — CEFAZOLIN SODIUM 2 G: 2 INJECTION, SOLUTION INTRAVENOUS at 07:26

## 2017-06-20 RX ADMIN — PREGABALIN 75 MG: 75 CAPSULE ORAL at 20:59

## 2017-06-20 RX ADMIN — FINASTERIDE 5 MG: 5 TABLET, FILM COATED ORAL at 20:59

## 2017-06-20 RX ADMIN — BUPIVACAINE HYDROCHLORIDE 2.8 ML: 5 INJECTION, SOLUTION EPIDURAL; INTRACAUDAL; PERINEURAL at 07:20

## 2017-06-20 RX ADMIN — MIDAZOLAM HYDROCHLORIDE 0.5 MG: 1 INJECTION, SOLUTION INTRAMUSCULAR; INTRAVENOUS at 07:13

## 2017-06-20 RX ADMIN — ASPIRIN 325 MG: 325 TABLET ORAL at 23:19

## 2017-06-20 RX ADMIN — ASPIRIN 325 MG: 325 TABLET ORAL at 13:45

## 2017-06-20 RX ADMIN — OXYCODONE HYDROCHLORIDE 10 MG: 5 TABLET ORAL at 17:46

## 2017-06-20 RX ADMIN — PROPOFOL 25 MCG/KG/MIN: 10 INJECTION, EMULSION INTRAVENOUS at 07:38

## 2017-06-20 NOTE — THERAPY EVALUATION
Acute Care - Physical Therapy Initial Evaluation   [place]     Patient Name: Zach Gutierrez  : 1940  MRN: [id number]  Today's Date: 2017   Onset of Illness/Injury or Date of Surgery Date: 17  Date of Referral to PT: 17  Referring Physician: Dr Thrasher      Admit Date: 2017     Visit Dx:    ICD-10-CM ICD-9-CM   1. Primary osteoarthritis of right knee M17.11 715.16     Patient Active Problem List   Diagnosis   • Osteoarthritis of right knee     Past Medical History:   Diagnosis Date   • Arthritis    • Dementia    • Diabetes    • Knee sprain    • ZENA (obstructive sleep apnea)     cpap     Past Surgical History:   Procedure Laterality Date   • COLONOSCOPY N/A 2017    Procedure: COLONOSCOPY;  Surgeon: Srikanth Payan MD;  Location: [facility];  Service:    • HERNIA REPAIR Bilateral     inguinal   • SHOULDER SURGERY Right     RCR          PT ASSESSMENT (last 72 hours)      PT Evaluation       17 1312     Rehab Evaluation    Document Type evaluation  -JW    Subjective Information agree to therapy;complains of;weakness  -JW    Patient Effort, Rehab Treatment good  -JW    Symptoms Noted During/After Treatment none  -JW    General Information    Patient Profile Review yes  -JW    Onset of Illness/Injury or Date of Surgery Date 17  -JW    Referring Physician Dr Thrasher  -JW    General Observations pt supine, immobilizer and drain in place.  family present.  pt agreeable to evaluation  -JW    Pertinent History Of Current Problem pt with worsening knee pain.  pt s/p right TKA  -JW    Precautions/Limitations fall precautions;brace on when up  -JW    Prior Level of Function independent:;all household mobility;community mobility;ADL's  -JW    Equipment Currently Used at Home cane, straight  -JW    Plans/Goals Discussed With patient and family;agreed upon  -JW    Risks Reviewed patient and family:;increased discomfort  -JW    Benefits Reviewed patient and family:;improve  function;increase independence;increase strength;decrease pain;increase balance  -JW    Barriers to Rehab hearing deficit  -JW    Living Environment    Lives With spouse  -JW    Living Arrangements house  -JW    Home Accessibility stairs to enter home  -JW    Number of Stairs to Enter Home 2  -JW    Stair Railings at Home outside, present at both sides  -JW    Type of Financial/Environmental Concern     Transportation Available     Living Environment Comment pt reports he lives in 1 story home with basement but does not have to go into basement.  pt states spouse will be available as needed upon return home.  -JW    Clinical Impression    Date of Referral to PT 06/20/17  -JW    Patient/Family Goals Statement return home  -JW    Criteria for Skilled Therapeutic Interventions Met yes;treatment indicated  -JW    Rehab Potential good, to achieve stated therapy goals  -JW    Predicted Duration of Therapy Intervention (days/wks) 3 days  -JW    Pain Assessment    Pain Assessment No/denies pain  -JW    Cognitive Assessment/Intervention    Current Cognitive/Communication Assessment functional  -JW    Orientation Status oriented to;person;place;time  -JW    Follows Commands/Answers Questions able to follow single-step instructions;100% of the time;needs increased time  -JW    Personal Safety WNL/WFL  -JW    Personal Safety Interventions gait belt;nonskid shoes/slippers when out of bed  -JW    ROM (Range of Motion)    General ROM other (see comments)   left LE WFL, right LE ROM limited approx 50%   -JW    General ROM Detail pt with complaints of numbness throughout distal right LE  -JW    Mobility Assessment/Training    Extremity Weight-Bearing Status right lower extremity  -JW    Right Lower Extremity Weight-Bearing weight-bearing as tolerated  -JW    Bed Mobility, Assessment/Treatment    Bed Mobility, Assistive Device bed rails;head of bed elevated  -JW    Bed Mob, Supine to Sit, [place] minimum assist (75% patient  effort);verbal cues required  -    Bed Mob, Sit to Supine, [place] moderate assist (50% patient effort)   assist for LEs into bed  -JW    Bed Mobility, Comment pt requires increased time to complete scooting to EOB  -    Transfer Assessment/Treatment    Transfers, Sit-Stand [place] moderate assist (50% patient effort);2 person assist required  -JW    Transfers, Stand-Sit [place] minimum assist (75% patient effort);moderate assist (50% patient effort);2 person assist required   assist for LE placement prior to returning to sitting  -JW    Transfers, Sit-Stand-Sit, Assist Device rolling walker  -JW    Transfer, Comment pt able to perform standing x2 trials from elevated bed surface.  Patient requires assistance for right LE foot placement prior to returning to sitting.  pt with episodes of knee buckling in standing.  -    Gait Assessment/Treatment    Gait, Comment pt unable to take steps as patient with difficulty tolerating weight bearing through right LE.  Patient with decreased ankle control  on right LE.  -    Sensory Assessment/Intervention    Sensory Impairment --   pt reports numbness/tingling in right LE  -JW    Positioning and Restraints    Pre-Treatment Position in bed  -JW    Post Treatment Position bed  -JW    In Bed supine;call light within reach;encouraged to call for assist;notified nsg;with family/caregiver  -            User Key  (r) = Recorded By, (t) = Taken By, (c) = Cosigned By    Initials Name Provider Type    BENJA Plata RN Case Manager    CP Maddi Smith, RN Registered Nurse    KASIE Araiza, PT Physical Therapist    AMINA Lopez RN Registered Nurse          Physical Therapy Education     Title: PT OT SLP Therapies (Done)     Topic: Physical Therapy (Done)     Point: Mobility training (Done)    Learning Progress Summary    Learner Readiness Method Response Comment Documented by Status   Patient Acceptance E ORIN   06/20/17 6578 Done                Point: Home exercise program (Done)    Learning Progress Summary    Learner Readiness Method Response Comment Documented by Status   Patient Acceptance E ORIN   06/20/17 1444 Done                      User Key     Initials Effective Dates Name Provider Type Discipline     12/01/15 -  Philomena Araiza PT Physical Therapist PT                PT Recommendation and Plan  Anticipated Equipment Needs At Discharge: front wheeled walker, bedside commode  Anticipated Discharge Disposition: home with home health (pt and spouse report they prefer home health PT)  Planned Therapy Interventions: balance training, bed mobility training, gait training, home exercise program, strengthening, transfer training, stair training  PT Frequency: 2 times/day  Plan of Care Review  Plan Of Care Reviewed With: patient  Outcome Summary/Follow up Plan: Physical therapy evaluation complete.  Patient requires min assist for supine to sit transfer and sit to stand transfers with mod assist x2.  Patient with episodes of knee buckling upon standing and unable to take steps at this time.  Patient will benefit from further physical therapy to address deficits in functional mobility, strength and ROM.  Patient will be seen twice daily for therapy.  Patient reports plan is to discharge home with home health PT.          IP PT Goals       06/20/17 1444          Bed Mobility PT STG    Bed Mobility PT STG, Date Established 06/20/17  -      Bed Mobility PT STG, Time to Achieve 3 days  -      Bed Mobility PT STG, Activity Type all bed mobility  -      Bed Mobility PT STG, [place] Level supervision required  -      Transfer Training PT STG    Transfer Training PT STG, Date Established 06/20/17  -      Transfer Training PT STG, Time to Achieve 3 days  -      Transfer Training PT STG, Activity Type all transfers  -      Transfer Training PT STG, [place] Level supervision required  -      Transfer Training PT STG, Assist Device walker,  rolling  -JW      Gait Training PT STG    Gait Training Goal PT STG, Date Established 06/20/17  -JW      Gait Training Goal PT STG, Time to Achieve 3 days  -JW      Gait Training Goal PT STG, [place] Level supervision required  -JW      Gait Training Goal PT STG, Assist Device walker, rolling  -JW      Gait Training Goal PT STG, Distance to Achieve 100  -JW      Stair Training PT STG    Stair Training Goal PT STG, Date Established 06/20/17  -JW      Stair Training Goal PT STG, Time to Achieve 3 days  -JW      Stair Training Goal PT STG, Number of Steps 2  -JW      Stair Training Goal PT STG, [place] Level minimum assist (75% patient effort)  -JW      Stair Training Goal PT STG, Assist Device 2 handrails  -JW      Patient Education PT STG    Patient Education PT STG, Date Established 06/20/17  -JW      Patient Education PT STG, Time to Achieve 3 days  -JW      Patient Education PT STG, Education Type written program;HEP  -JW      Patient Education PT STG, Education Understanding demonstrate adequately;verbalize understanding  -JW        User Key  (r) = Recorded By, (t) = Taken By, (c) = Cosigned By    Initials Name Provider Type    KASIE Araiza, PT Physical Therapist                Outcome Measures       06/20/17 1312          How much help from another person do you currently need...    Turning from your back to your side while in flat bed without using bedrails? 3  -JW      Moving from lying on back to sitting on the side of a flat bed without bedrails? 3  -JW      Moving to and from a bed to a chair (including a wheelchair)? 1  -JW      Standing up from a chair using your arms (e.g., wheelchair, bedside chair)? 1  -JW      Climbing 3-5 steps with a railing? 1  -JW      To walk in hospital room? 1  -JW      AM-PAC 6 Clicks Score 10  -JW      Functional Assessment    Outcome Measure Options AM-PAC 6 Clicks Basic Mobility (PT)  -JW        User Key  (r) = Recorded By, (t) = Taken By, (c) = Cosigned By     Initials Name Provider Type    KASIE Araiza PT Physical Therapist           Time Calculation:         PT Charges       06/20/17 1448          Time Calculation    Start Time 1312  -KASIE      PT Received On 06/20/17  -KASIE      PT - Next Appointment 06/21/17  -KASIE        User Key  (r) = Recorded By, (t) = Taken By, (c) = Cosigned By    Initials Name Provider Type    KASIE Araiza PT Physical Therapist          Therapy Charges for Today     Code Description Service Date Service Provider Modifiers Qty    97734635405 HC PT EVAL MOD COMPLEXITY 3 6/20/2017 Philomena Araiza, PT GP 1    90955441082 HC PT THER SUPP EA 15 MIN 6/20/2017 Philomena Araiza, PT GP 2          PT G-Codes  Outcome Measure Options: AM-PAC 6 Clicks Basic Mobility (PT)      Philomena Araiza PT  6/20/2017

## 2017-06-20 NOTE — PLAN OF CARE
Problem: Perioperative Period (Adult)  Goal: Signs and Symptoms of Listed Potential Problems Will be Absent or Manageable (Perioperative Period)  Outcome: Ongoing (interventions implemented as appropriate)    06/20/17 0627   Perioperative Period   Problems Assessed (Perioperative Period) all   Problems Present (Perioperative Period) none         06/20/17 0627   Perioperative Period   Problems Assessed (Perioperative Period) all   Problems Present (Perioperative Period) pain

## 2017-06-20 NOTE — NURSING NOTE
Discharge Planning Assessment  SHAQUILLE Nation     Patient Name: Zach Gutierrez  MRN: [id number]  Today's Date: 6/20/2017    Admit Date: 6/20/2017          Discharge Needs Assessment       06/20/17 1346    Living Environment    Home Accessibility bed and bath on same level;stairs within home;stairs (1 railing present)    Number of Stairs to Enter Home 3    Type of Financial/Environmental Concern none    Transportation Available family or friend will provide;car    Living Environment    Provides Primary Care For no one    Quality Of Family Relationships supportive;helpful;involved    Able to Return to Prior Living Arrangements yes    Discharge Needs Assessment    Concerns To Be Addressed discharge planning concerns    Readmission Within The Last 30 Days no previous admission in last 30 days    Anticipated Changes Related to Illness --   Will continue to assess    Equipment Currently Used at Home glucometer;bipap/ cpap   Patient has a walking stick    Equipment Needed After Discharge cane, quad;walker, rolling;commode    Discharge Planning Comments Spoke with patient and family at bedside.  He lives at home with his wife in a multilevel dwelling.  No previous home health; list provided for review by family.  Patient has the follwoing DME: glucometer and bipap, and a walking stick.  Preference for new DME is [facility].  Independent with ADL's prior to surgery.  Wife provides transportation as needed.  Pharmacy is [facility] in [place].  No difficulty paying for medications.  Living will at home ; wife to provide  for scan.  Plan is home with Home Health.  Will continue to follow.             Discharge Plan       06/20/17 3402    Case Management/Social Work Plan    Plan home with home health    Patient/Family In Agreement With Plan yes    Additional Comments Spoke with Wife at bedside - she prefers [facility].  Contacted Janae with [facility] related to referral.Will continue to follow.        06/20/17 2420    Case  Management/Social Work Plan    Plan Home with Home Health.    Patient/Family In Agreement With Plan yes    Additional Comments Spoke with patient and family at bedside.  He lives at home with his wife in a multilevel dwelling.  No previous home health; list provided for review by family.  Patient has the follwoing DME: glucometer and bipap, and a walking stick.  Preference for new DME is [facility].  Independent with ADL's prior to surgery.  Wife provides transportation as needed.  Pharmacy is [facility] in [place].  No difficulty paying for medications.  Living will at home ; wife to provide  for scan.  Plan is home with Home Health.  Will continue to follow.         Discharge Placement     Facility/Agency Request Status Selected? Address Phone Number Fax Number    Psych[facility] Accepted    [facility] [address] [address] [phone]                Demographic Summary       06/20/17 1344    Referral Information    Admission Type inpatient    Arrived From home or self-care    Referral Source admission list;physician    Reason For Consult discharge planning    Record Reviewed medical record    Contact Information    Permission Granted to Share Information With ;family/designee    Primary Care Physician Information    Name Dr Mac            Functional Status     None            Psychosocial     None            Abuse/Neglect     None            Legal     None            Substance Abuse     None            Patient Forms       06/20/17 1357    Patient Forms    Provider Choice List Delivered    Delivered to Support person    Method of delivery In person          Heike Plata RN

## 2017-06-20 NOTE — CONSULTS
HOSPITALIST SERVICES  @ [facility]                Hospitalist Team  CONSULTATION NOTE      Patient Care Team:  Jessica Mac MD as PCP - General  Jessica Mac MD as PCP - Family Medicine  Jessica Mac MD as PCP - Claims Attributed    CHIEF COMPLAINT:     Rt Knee pain due to end-stage DJD    HISTORY OF PRESENT ILLNESS:    Mr. Zach Gutierrez is a 76 year old  male who has multiple medical problems as listed below. He was admitted by Dr. Thrasher from orthopedics as he has failed conservative treatment and was taken to OR and had Rt Total Knee Arthroplasty done today. Dr Thrasher contacted Hospitalist service to address patient's nonsurgical medical issues. Patient is resting comfortably and is not in any pain. Patient denies any sx of headache, chest pain, shortness of breath, abdominal pain, n/v, dysuria or recent hx of blood in stool. No other hx available.           Past Medical History:   Diagnosis Date   • Arthritis    • Dementia    • Diabetes    • Knee sprain    • ZENA (obstructive sleep apnea)     cpap     Past Surgical History:   Procedure Laterality Date   • COLONOSCOPY N/A 5/19/2017    Procedure: COLONOSCOPY;  Surgeon: Srikanth Payan MD;  Location: [facility];  Service:    • HERNIA REPAIR Bilateral     inguinal   • SHOULDER SURGERY Right 2007    RCR     History reviewed. No pertinent family history.  Social History   Substance Use Topics   • Smoking status: Former Smoker   • Smokeless tobacco: Never Used   • Alcohol use Yes      Comment: one jim/week     Prescriptions Prior to Admission   Medication Sig Dispense Refill Last Dose   • metFORMIN (GLUCOPHAGE) 500 MG tablet Take 500 mg by mouth 2 (Two) Times a Day With Meals.   Past Week at Unknown time   • mupirocin (BACTROBAN) 2 % ointment Apply  topically 3 (Three) Times a Day for 5 days. PLEASE APPLY THREE TIMES PER DAY TO EACH NOSTRIL; START 5 DAYS BEFORE SURGERY 22 g 0 6/19/2017 at Unknown time   • sertraline  "(ZOLOFT) 25 MG tablet Take 25 mg by mouth Daily.   6/20/2017 at Unknown time   • aspirin 81 MG tablet Take 81 mg by mouth.   6/16/2017   • donepezil (ARICEPT) 10 MG tablet Take 10 mg by mouth Every Night.   6/18/2017   • finasteride (PROSCAR) 5 MG tablet Take 5 mg by mouth Every Night.   6/18/2017   • memantine (NAMENDA XR) 28 MG capsule sustained-release 24 hr extended release capsule Take 28 mg by mouth Every Night.   6/18/2017   • Multiple Vitamin (MULTIVITAMIN) tablet Take 1 tablet by mouth Daily.   6/18/2017   • tamsulosin (FLOMAX) 0.4 MG capsule 24 hr capsule Take 1 capsule by mouth Every Night.   6/18/2017     Allergies:  Review of patient's allergies indicates no known allergies.    REVIEW OF SYSTEMS:  Please see the above history of present illness for pertinent positives and negatives.  The remainder of the patient's systems have been reviewed and are negative.     Vital Signs  Temp:  [96.8 °F (36 °C)-98.4 °F (36.9 °C)] 97.5 °F (36.4 °C)  Heart Rate:  [46-67] 60  Resp:  [12-18] 18  BP: (106-145)/(55-88) 134/72    Flowsheet Rows         First Filed Value    Admission Height  71\" (180.3 cm) Documented at 06/20/2017 1038    Admission Weight  200 lb 12.8 oz (91.1 kg) Documented at 06/20/2017 0627           Physical Exam:    PHYSICAL EXAMINATION:    VITAL SIGNS: As per Nurse's notes    GENERAL APPEARANCE: The patient is a well developed, well nourished, , in no acute distress without complaints of shortness of breath, dyspnea or acute pain. Lips and nail beds are pink.    HEENT: Normocephalic, atraumatic. PERRL. The sclerae anicteric and conjunctivae pink and moist. The oral mucosa, hard and soft palate, tongue and posterior pharynx were normal.     NECK: Supple and symmetric. No masses. No thyromegaly. No tenderness. Trachea central. No carotid bruits. No evidence of JVD.    LYMPH NODES: No palpable lymphadenopathy.    SKIN: Warm, dry and intact. No rash or lesions or wounds or patechiae.    CHEST: " Normal AP diameter and normal contour without any kyphoscoliosis.    LUNGS: Clear to auscultation bilaterally. Respiratory expansion equal bilaterally. No wheezes/rales/crackles/rubs.    CARDIOVASCULAR: RRR. S1, S2 normal without murmur/gallop/rub. No S3, S4. The carotid pulses were normal and 2+ bilaterally without bruits. Peripheral pulses were 2+ and symmetric. Capillary refill less than 3 seconds.    ABDOMEN: Soft, non-tender, non-distended. No masses. No rebound/guarding. No hepatosplenomegaly. Normal bowel sounds.     RECTAL: Not indicated for this visit.     EXTREMITIES:  S/P Right total knee arthroplasty. No evidence of cyanosis, clubbing, or edema. No rash or lesions. + pedal pulses.     MUSCULOSKELETAL: S/P Right total knee arthroplasty. Muscle strength and tone normal.    PSYCHIATRY: Responds appropriately to questions. No evidence of acute anxiety, depression, panic attacks or hallucinations.    MENTAL STATUS EXAMINATION: Affect and insight appropriate. Speech and behavior are normal. Patient is alert and oriented x3. Thought Process WNL.     NEUROLOGIC: Examination is grossly intact globally with no focal deficits. Cranial nerves II through XII are grossly intact. No motor weakness. No decrease in sensation. No facial asymmetry.                  Results Review:    I reviewed the patient's new clinical results.  Lab Results (most recent)     Procedure Component Value Units Date/Time    POC Glucose Fingerstick [[id number]]  (Normal) Collected:  06/20/17 0646    Specimen:  Blood Updated:  06/20/17 0701     Glucose 124 mg/dL     Narrative:       Meter: [id number] : [id number] Ashish Doty RN    POC Glucose Fingerstick [[id number]]  (Abnormal) Collected:  06/20/17 1645    Specimen:  Blood Updated:  06/20/17 1651     Glucose 180 (H) mg/dL     Narrative:       Meter: [id number] : [id number] Pope Trey LUIS          Imaging Results (most recent)     Procedure Component Value Units Date/Time    XR Knee 1  or 2 View Right [[id number]] Collected:  06/20/17 0951     Updated:  06/20/17 0953    Narrative:       POSTOP RIGHT KNEE SERIES, 06/20/2017::     HISTORY:  Postop knee arthroplasty surgery.     TECHNIQUE:  AP and crosstable lateral radiographs of the right knee were obtained  portably following surgery.     FINDINGS:  Total knee arthroplasty components are well-positioned postoperatively.  No visible fracture or dislocation.       Impression:       Satisfactory postoperative appearance following right total knee  arthroplasty.     This report was finalized on 6/20/2017 9:51 AM by Dr. Leonid Munoz MD.           reviewed    ECG/EMG Results (most recent)     None        reviewed    Assessment/Plan           ASSESSMENT AND PLAN:       SUMMARY:    ?   PROPHYLAXIS:   -Oxygen Saturation: As per Nurses' notes.   -DVT Prophylaxis: On SCDs    -Gastritis Prophylaxis: Pantoprazole   -Constipation Prophylaxis: SenoKot-S, Dulcolax    -Immunizations: N/A  -Smoking/ Nicotine issues: N/A      NUTRITION AND FLUIDS:  -Diet/ Nutrition: Regular, Consistent carbohydrate, cardiac diet   -Fluid Status/Electrolytes: LR 30 mL/Hr      THERAPEUTIC:   ALLERGIES: NKDA  ANTIBIOTICS: as per Dr Thrasher   PAIN MANAGEMENT: Roxicodone   INSULIN THERAPY: Low dose insulin coverage as ordered   CHEST PAIN: N/A   NEBULIZER TREATMENT: N/A    ANXIETY: N/A    DEPRESSION: Zoloft    INSOMNIA: N/A             PLAN:    Labs and diagnostic tests reviewed: Labs from 6/13 reviewed    Diagnostic tests reviewed:      PostOp Rt Knee X-Ray  IMPRESSION:  Satisfactory postoperative appearance following right total knee  arthroplasty.      This report was finalized on 6/20/2017 9:51 AM by Dr. Leonid Munoz MD.      EKG  SINUS RHYTHM  NO PRIOR TRACING AVAILABLE FOR COMPARISON  Electronically Signed by:  Maged Dhaliwal ([facility]) 13-Jun-2017 11:53:30  Date and Time of Study: 2017-06-13 09:13:50          Specimen Collected: 06/13/17  9:13 AM Last Resulted: 06/13/17 11:56 AM                 Patient is clinically and hemodynamically stable    Any new recommendations: As per Dr. Thrasher    New Labs ordered: As per Dr. Thrasher    New diagnostic tests ordered: As per Dr. Thrasher    Any changes in medications: N/A    To continue current management and supportive care    Pain management issues: Roxicodone    Discharge planning issues: As per Dr. Thrasher    Will follow patient closely    Nothing new to add for right now            DIAGNOSES:    PRIMARY DIAGNOSES:    1) HTN: Last /72. Not on any antihypertensives    2) Type II DM: On Metformin    3) Depression: On Zoloft    4) DJD: Hx noted    5) ZENA: Hx noted    6) Dementia: On Aricept and Namenda    7) Former smoker: Hx noted    8) BPH: On Flomax and Proscar    9) S/P Rt Total Knee Arthroplasty done today by Dr Thrasher    9) DVT Prophylaxis: On SCDs  ?     SECONDARY DIAGNOSES:  ?   As above    SURGICAL DIAGNOSES:      As per Problem List          I discussed the patients findings and my recommendations with patient and his family and they are agreeable to current treatment and management plan.     Yuniel Gonzalez MD  06/20/17  5:36 PM          Yuniel Gonzalez M.D., FACP  Internal Medicine/ Hospitalist        Time:

## 2017-06-20 NOTE — OP NOTE
DATE OF PROCEDURE:  06/20/2017      PREOPERATIVE DIAGNOSIS: End-stage degenerative arthritis, right knee.     POSTOPERATIVE DIAGNOSIS: End-stage degenerative arthritis, right knee.     PROCEDURE PERFORMED: Right total knee arthroplasty using the OrthAlign navigational system and a Emani implant #5 PC-sparing femoral component, 5 tibial tray, 9 mm CS insert, with a 38 patella button and antibiotic cement.     SURGEON: Bj Thrasher MD      ASSISTANT: Dinh Gregorio CSA      ANESTHESIA: Spinal with adductor canal block.     TOURNIQUET TIME: 56 minutes.       ESTIMATED BLOOD LOSS: 50 mL.     DRAINS: x1.      COMPLICATIONS: None.     DESCRIPTION OF PROCEDURE: The patient was brought to the operating room and after satisfactory anesthesia was obtained, the patient was placed supine on the operating table and pneumonic tourniquet was placed on the right upper leg. Timeout was completed, identifying the patient and the procedure correctly. The patient was given preoperatively 2 g of Kefzol and 1 g of IV Tylenol. The leg was then prepped and after the prep dried for 3 minutes, it was draped in a sterile field in the usual manner with timeout again completed. The leg was then exsanguinated and the tourniquet was elevated to 250. A midline incision was then made with full-thickness subcutaneous flaps developed, and then a medial arthrotomy incision was carried out. The patella was subluxed laterally. Infrapatellar fat pad, anterior cruciate, supracondylar fat pad, and the ACL were then excised. A pin was then placed in the intercondylar notch, and then the femoral OrthAlign navigational system was then applied to the distal femur and after the appropriate navigational maneuvers, the distal femoral cut was completed. Once that cut was completed, the patient was templated for a 5 femoral component. With the 5 jig in place, the anterior, posterior, and chamfer cuts were completed. The remainder of the peripheral  osteophytes were then removed. A trial reduction then with a 5 femoral component showed excellent bone to prosthesis contact. Posterior cruciate retractor was then inserted and the remainder of the menisci were removed, and then the tibial OrthAlign navigational system was applied to the tibia. Again, after the appropriate navigational maneuvers, the proximal tibial cut was completed. Once this was completed, inspection of the posterior recess allowed for removal of the remaining menisci and it was noted that the PCL was still intact. A gap balancing testing at 0° and 90° was symmetrical. Then, 40 mL of the Exparel solution which was 20 mL of Exparel, 60 mL of 0.25% plain Marcaine, and 60 mL of normal saline was then injected into the posterior capsule of the periosteal tissue in the femur. A trial reduction then with the 5 tibial tray, 9 insert and the 5 femur showed excellent stability throughout the arc of motion with no gap balance instability at 0° and 90° throughout the arc of motion. Drill holes were made in the femur, the keel cut was made in the tibia, and then the patella was cut for 38 patella button. A lateral release was then completed to allow better tracking of the patella. All trial components were then removed and the bone was lavaged and packed dry. Two packs of antibiotic cement were prepared. Once the cement was readied, the #5 nonporous keel tibial tray was cemented into place. After complete seating and removal of the excess cement, the 9 mm CS insert was placed over the tibial tray, and then the #5 femoral component was cemented into place. Again, after complete seating and removal of excess cement, the knee was brought through full extension and axially-loaded while the 38 patella button was cemented into place. After all the excess cement had been removed, while the cement was hardening, the 120 mL of the Exparel solution was then injected into the remaining tissues. Once the cement had  hardened, the tourniquet was released. Hemostasis was obtained with electrocautery. The wound was irrigated with topical tranexamic acid, and then Betadine and normal saline. Hemovac was placed in the deep recesses of the wound. The arthrotomy was closed with corner stitches of #2 Vicryl and a running stitch of #1 StrataFix. Subcutaneous was closed with 0 Stratafix and 3-0 Stratafix, and the wound was closed with a Prineo Dermabond dressing. A bulky, sterile dressing was then applied. The patient was transferred to recovery, having tolerated the procedure well.        CHINO Myles  D:  06/20/2017 09:15:33   T:  06/20/2017 10:10:53   Job ID:  [id number]   Document ID:  [id number]  cc:

## 2017-06-20 NOTE — PLAN OF CARE
Problem: Patient Care Overview (Adult)  Goal: Adult Individualization and Mutuality  Outcome: Ongoing (interventions implemented as appropriate)    06/20/17 9832   Individualization   Patient Specific Preferences none

## 2017-06-20 NOTE — ANESTHESIA PREPROCEDURE EVALUATION
Anesthesia Evaluation     Patient summary reviewed   no history of anesthetic complications:  NPO Solid Status: > 8 hours  NPO Liquid Status: > 8 hours     Airway   Mallampati: II  TM distance: >3 FB  Neck ROM: full  no difficulty expected  Dental    (+) upper dentures and lower dentures    Pulmonary - normal exam    breath sounds clear to auscultation  (+) sleep apnea on CPAP,   Cardiovascular - normal exam  Exercise tolerance: good (4-7 METS)    Rhythm: regular  Rate: normal        Neuro/Psych  (+) dementia,    GI/Hepatic/Renal/Endo    (+)  diabetes mellitus (bs 124) type 2 well controlled,     Musculoskeletal     (+) arthralgias,   Abdominal  - normal exam   Substance History      OB/GYN          Other   (+) arthritis                                     Anesthesia Plan    ASA 2     spinal and regional   (ACB)  Anesthetic plan and risks discussed with patient.  Use of blood products discussed with patient  Consented to blood products.

## 2017-06-20 NOTE — NURSING NOTE
Discharge Planning Assessment  SHAQUILLE Nation     Patient Name: Zach Gutierrez  MRN: [id number]  Today's Date: 6/20/2017    Admit Date: 6/20/2017          Discharge Needs Assessment       06/20/17 1346    Living Environment    Home Accessibility bed and bath on same level;stairs within home;stairs (1 railing present)    Number of Stairs to Enter Home 3    Type of Financial/Environmental Concern none    Transportation Available family or friend will provide;car    Living Environment    Provides Primary Care For no one    Quality Of Family Relationships supportive;helpful;involved    Able to Return to Prior Living Arrangements yes    Discharge Needs Assessment    Concerns To Be Addressed discharge planning concerns    Readmission Within The Last 30 Days no previous admission in last 30 days    Anticipated Changes Related to Illness --   Will continue to assess    Equipment Currently Used at Home glucometer;bipap/ cpap   Patient has a walking stick    Equipment Needed After Discharge cane, quad;walker, rolling;commode    Discharge Planning Comments Spoke with patient and family at bedside.  He lives at home with his wife in a multilevel dwelling.  No previous home health; list provided for review by family.  Patient has the follwoing DME: glucometer and bipap, and a walking stick.  Preference for new DME is [facility].  Independent with ADL's prior to surgery.  Wife provides transportation as needed.  Pharmacy is [facility] in [place].  No difficulty paying for medications.  Living will at home ; wife to provide  for scan.  Plan is home with Home Health.  Will continue to follow.             Discharge Plan       06/20/17 3101    Case Management/Social Work Plan    Plan Home with Home Health.    Patient/Family In Agreement With Plan yes    Additional Comments Spoke with patient and family at bedside.  He lives at home with his wife in a multilevel dwelling.  No previous home health; list provided for review by family.  Patient  has the follwoing DME: glucometer and bipap, and a walking stick.  Preference for new DME is [facility].  Independent with ADL's prior to surgery.  Wife provides transportation as needed.  Pharmacy is [facility] in [place].  No difficulty paying for medications.  Living will at home ; wife to provide  for scan.  Plan is home with Home Health.  Will continue to follow.         Discharge Placement     No information found                Demographic Summary       06/20/17 1344    Referral Information    Admission Type inpatient    Arrived From home or self-care    Referral Source admission list;physician    Reason For Consult discharge planning    Record Reviewed medical record    Contact Information    Permission Granted to Share Information With ;family/designee    Primary Care Physician Information    Name Dr Mac            Functional Status     None            Psychosocial     None            Abuse/Neglect     None            Legal     None            Substance Abuse     None            Patient Forms       06/20/17 1357    Patient Forms    Provider Choice List Delivered    Delivered to Support person    Method of delivery In person          Heike Plata RN

## 2017-06-20 NOTE — PLAN OF CARE
Problem: Patient Care Overview (Adult)  Goal: Discharge Needs Assessment  Outcome: Ongoing (interventions implemented as appropriate)    06/20/17 6386   Discharge Needs Assessment   Concerns To Be Addressed discharge planning concerns   Readmission Within The Last 30 Days no previous admission in last 30 days   Equipment Needed After Discharge cane, quad;walker, rolling;commode   Discharge Planning Comments Spoke with patient and family at bedside. He lives at home with his wife in a multilevel dwelling. No previous home health; list provided for review by family. Patient has the follwoing DME: glucometer and bipap, and a walking stick. Preference for new DME is [facility]. Independent with ADL's prior to surgery. Wife provides transportation as needed. Pharmacy is [facility] in [place]. No difficulty paying for medications. Living will at home ; wife to provide for scan. Plan is home with Home Health. Will continue to follow.    Current Health   Anticipated Changes Related to Illness (Will continue to assess)   Living Environment   Transportation Available family or friend will provide;car   Self-Care   Equipment Currently Used at Home glucometer;bipap/ cpap  (Patient has a walking stick)

## 2017-06-20 NOTE — ANESTHESIA PROCEDURE NOTES
Peripheral Block    Patient location during procedure: pre-op  Reason for block: at surgeon's request, post-op pain management and post-op pain management  Performed by  CRNA: LINDSEY ROSE  Preanesthetic Checklist  Completed: patient identified, site marked, surgical consent, pre-op evaluation, timeout performed, IV checked, risks and benefits discussed and monitors and equipment checked  Peripheral Block Prep:  Sterile barriers:cap, gloves and mask  Prep: ChloraPrep  Patient monitoring: continuous pulse oximetry, blood pressure monitoring and EKG  Peripheral Procedure  Sedation:yes  Guidance:ultrasound guided  Images:still images obtained    Laterality:right  Block Type:adductor canal block  Injection Technique:single-shot  Needle Type:echogenic  Needle Gauge:21 G  ULTRASOUND INTERPRETATION. Using ultrasound guidance a 21 G gauge needle was placed in close proximity to the nerve, at which point, under ultrasound guidance anesthetic was injected in the area of the nerve and spread of the anesthesia was seen on ultrasound in close proximity thereto.  There were no abnormalities seen on ultrasound; a digital image was taken; and the patient tolerated the procedure with no complications.   Medications  Local Injected:ropivacaine 0.5% Local Amount Injected:15mL  Post Assessment  Injection Assessment: negative aspiration for heme, no paresthesia on injection and incremental injection  Patient Tolerance:comfortable throughout block  Complications:no  Additional Notes  1% lido used for local infiltration of skin.  Easy and effective X 1 attempt.  VSS throughout procedure.

## 2017-06-20 NOTE — PLAN OF CARE
Problem: Patient Care Overview (Adult)  Goal: Adult Individualization and Mutuality  Outcome: Ongoing (interventions implemented as appropriate)    06/20/17 1016   Individualization   Patient Specific Preferences goes by hemant   Patient Specific Goals get this knee fixed   Patient Specific Interventions cooling devise and immobilizer   Mutuality/Individual Preferences   What Anxieties, Fears or Concerns Do You Have About Your Health or Care? none voiced   What Questions Do You Have About Your Health or Care? what did he find?   What Information Would Help Us Give You More Personalized Care? I dont know

## 2017-06-20 NOTE — PLAN OF CARE
Problem: Patient Care Overview (Adult)  Goal: Plan of Care Review  Outcome: Ongoing (interventions implemented as appropriate)    06/20/17 1016   Coping/Psychosocial Response Interventions   Plan Of Care Reviewed With patient   Patient Care Overview   Progress improving   Outcome Evaluation   Outcome Summary/Follow up Plan denies pain or nausea

## 2017-06-20 NOTE — PLAN OF CARE
Problem: Patient Care Overview (Adult)  Goal: Plan of Care Review  Outcome: Ongoing (interventions implemented as appropriate)    06/20/17 0626   Coping/Psychosocial Response Interventions   Plan Of Care Reviewed With patient   Patient Care Overview   Progress no change   Outcome Evaluation   Outcome Summary/Follow up Plan vss, waiting for procedure

## 2017-06-20 NOTE — PLAN OF CARE
Problem: Patient Care Overview (Adult)  Goal: Plan of Care Review  Outcome: Ongoing (interventions implemented as appropriate)    06/20/17 2984   Coping/Psychosocial Response Interventions   Plan Of Care Reviewed With patient   Outcome Evaluation   Outcome Summary/Follow up Plan Physical therapy evaluation complete. Patient requires min assist for supine to sit transfer and sit to stand transfers with mod assist x2. Patient with episodes of knee buckling upon standing and unable to take steps at this time. Patient will benefit from further physical therapy to address deficits in functional mobility, strength and ROM. Patient will be seen twice daily for therapy. Patient reports plan is to discharge home with home health PT.

## 2017-06-20 NOTE — ANESTHESIA POSTPROCEDURE EVALUATION
Patient: Zach Gutierrez    Procedure Summary     Date Anesthesia Start Anesthesia Stop Room / Location    06/20/17 0726 0926 [facility]       Procedure Diagnosis Surgeon Provider    TOTAL KNEE ARTHROPLASTY hima orthoalign antibiotic cement 7fr hemovac placement (Right Knee) Primary osteoarthritis of right knee  (Primary osteoarthritis of right knee [M17.11]) MD Samuel Paredes CRNA          Anesthesia Type: spinal, regional  Last vitals  BP      Temp      Pulse     Resp      SpO2        Post Anesthesia Care and Evaluation    Patient location during evaluation: bedside  Patient participation: complete - patient participated  Level of consciousness: awake and alert  Pain score: 0  Pain management: adequate  Airway patency: patent  Anesthetic complications: No anesthetic complications    Cardiovascular status: acceptable  Respiratory status: acceptable  Hydration status: acceptable

## 2017-06-20 NOTE — PLAN OF CARE
Problem: Perioperative Period (Adult)  Goal: Signs and Symptoms of Listed Potential Problems Will be Absent or Manageable (Perioperative Period)  Outcome: Ongoing (interventions implemented as appropriate)    06/20/17 1017   Perioperative Period   Problems Assessed (Perioperative Period) all   Problems Present (Perioperative Period) pain;physiologic stress response

## 2017-06-20 NOTE — PLAN OF CARE
Problem: Inpatient Physical Therapy  Goal: Bed Mobility Goal STG- PT  Outcome: Ongoing (interventions implemented as appropriate)    06/20/17 1444   Bed Mobility PT STG   Bed Mobility PT STG, Date Established 06/20/17   Bed Mobility PT STG, Time to Achieve 3 days   Bed Mobility PT STG, Activity Type all bed mobility   Bed Mobility PT STG, [place] Level supervision required       Goal: Transfer Training Goal 1 STG- PT  Outcome: Ongoing (interventions implemented as appropriate)    06/20/17 1444   Transfer Training PT STG   Transfer Training PT STG, Date Established 06/20/17   Transfer Training PT STG, Time to Achieve 3 days   Transfer Training PT STG, Activity Type all transfers   Transfer Training PT STG, [place] Level supervision required   Transfer Training PT STG, Assist Device walker, rolling       Goal: Gait Training Goal STG- PT  Outcome: Ongoing (interventions implemented as appropriate)    06/20/17 1444   Gait Training PT STG   Gait Training Goal PT STG, Date Established 06/20/17   Gait Training Goal PT STG, Time to Achieve 3 days   Gait Training Goal PT STG, [place] Level supervision required   Gait Training Goal PT STG, Assist Device walker, rolling   Gait Training Goal PT STG, Distance to Achieve 100       Goal: Stair Training Goal STG- PT  Outcome: Ongoing (interventions implemented as appropriate)    06/20/17 1444   Stair Training PT STG   Stair Training Goal PT STG, Date Established 06/20/17   Stair Training Goal PT STG, Time to Achieve 3 days   Stair Training Goal PT STG, Number of Steps 2   Stair Training Goal PT STG, [place] Level minimum assist (75% patient effort)   Stair Training Goal PT STG, Assist Device 2 handrails       Goal: Patient Education Goal STG- PT  Outcome: Ongoing (interventions implemented as appropriate)    06/20/17 1444   Patient Education PT STG   Patient Education PT STG, Date Established 06/20/17   Patient Education PT STG, Time to Achieve 3 days   Patient  Education PT STG, Education Type written program;HEP   Patient Education PT STG, Education Understanding demonstrate adequately;verbalize understanding

## 2017-06-21 LAB
APTT PPP: 31.5 SECONDS (ref 24.3–38.1)
BASOPHILS # BLD AUTO: 0.02 10*3/MM3 (ref 0–0.2)
BASOPHILS NFR BLD AUTO: 0.3 % (ref 0–2)
DEPRECATED RDW RBC AUTO: 40.8 FL (ref 37–54)
EOSINOPHIL # BLD AUTO: 0.04 10*3/MM3 (ref 0.1–0.3)
EOSINOPHIL NFR BLD AUTO: 0.5 % (ref 0–4)
ERYTHROCYTE [DISTWIDTH] IN BLOOD BY AUTOMATED COUNT: 12.4 % (ref 11.5–14.5)
GLUCOSE BLDC GLUCOMTR-MCNC: 163 MG/DL (ref 70–130)
GLUCOSE BLDC GLUCOMTR-MCNC: 182 MG/DL (ref 70–130)
GLUCOSE BLDC GLUCOMTR-MCNC: 206 MG/DL (ref 70–130)
GLUCOSE BLDC GLUCOMTR-MCNC: 207 MG/DL (ref 70–130)
HCT VFR BLD AUTO: 34.9 % (ref 42–52)
HGB BLD-MCNC: 12.1 G/DL (ref 14–18)
IMM GRANULOCYTES # BLD: 0.04 10*3/MM3 (ref 0–0.03)
IMM GRANULOCYTES NFR BLD: 0.5 % (ref 0–0.5)
LYMPHOCYTES # BLD AUTO: 0.81 10*3/MM3 (ref 0.6–4.8)
LYMPHOCYTES NFR BLD AUTO: 10.3 % (ref 20–45)
MCH RBC QN AUTO: 31.6 PG (ref 27–31)
MCHC RBC AUTO-ENTMCNC: 34.7 G/DL (ref 31–37)
MCV RBC AUTO: 91.1 FL (ref 80–94)
MONOCYTES # BLD AUTO: 0.59 10*3/MM3 (ref 0–1)
MONOCYTES NFR BLD AUTO: 7.5 % (ref 3–8)
NEUTROPHILS # BLD AUTO: 6.4 10*3/MM3 (ref 1.5–8.3)
NEUTROPHILS NFR BLD AUTO: 80.9 % (ref 45–70)
NRBC BLD MANUAL-RTO: 0 /100 WBC (ref 0–0)
PLATELET # BLD AUTO: 221 10*3/MM3 (ref 140–500)
PMV BLD AUTO: 9 FL (ref 7.4–10.4)
RBC # BLD AUTO: 3.83 10*6/MM3 (ref 4.7–6.1)
WBC NRBC COR # BLD: 7.9 10*3/MM3 (ref 4.8–10.8)

## 2017-06-21 PROCEDURE — 82962 GLUCOSE BLOOD TEST: CPT

## 2017-06-21 PROCEDURE — 97116 GAIT TRAINING THERAPY: CPT

## 2017-06-21 PROCEDURE — 99024 POSTOP FOLLOW-UP VISIT: CPT | Performed by: ORTHOPAEDIC SURGERY

## 2017-06-21 PROCEDURE — 99232 SBSQ HOSP IP/OBS MODERATE 35: CPT | Performed by: INTERNAL MEDICINE

## 2017-06-21 PROCEDURE — 63710000001 INSULIN ASPART PER 5 UNITS: Performed by: INTERNAL MEDICINE

## 2017-06-21 PROCEDURE — 97165 OT EVAL LOW COMPLEX 30 MIN: CPT

## 2017-06-21 PROCEDURE — 97110 THERAPEUTIC EXERCISES: CPT

## 2017-06-21 PROCEDURE — 85025 COMPLETE CBC W/AUTO DIFF WBC: CPT | Performed by: ORTHOPAEDIC SURGERY

## 2017-06-21 PROCEDURE — 85730 THROMBOPLASTIN TIME PARTIAL: CPT | Performed by: ORTHOPAEDIC SURGERY

## 2017-06-21 RX ADMIN — INSULIN ASPART 3 UNITS: 100 INJECTION, SOLUTION INTRAVENOUS; SUBCUTANEOUS at 17:57

## 2017-06-21 RX ADMIN — ASPIRIN 325 MG: 325 TABLET ORAL at 12:03

## 2017-06-21 RX ADMIN — ACETAMINOPHEN 1000 MG: 500 TABLET, FILM COATED ORAL at 06:26

## 2017-06-21 RX ADMIN — ACETAMINOPHEN 1000 MG: 500 TABLET, FILM COATED ORAL at 17:57

## 2017-06-21 RX ADMIN — FINASTERIDE 5 MG: 5 TABLET, FILM COATED ORAL at 23:23

## 2017-06-21 RX ADMIN — DONEPEZIL HYDROCHLORIDE 10 MG: 5 TABLET, FILM COATED ORAL at 23:18

## 2017-06-21 RX ADMIN — OXYCODONE HYDROCHLORIDE 10 MG: 5 TABLET ORAL at 06:25

## 2017-06-21 RX ADMIN — DOCUSATE SODIUM AND SENNOSIDES 2 TABLET: 8.6; 5 TABLET, FILM COATED ORAL at 17:57

## 2017-06-21 RX ADMIN — ASPIRIN 325 MG: 325 TABLET ORAL at 23:20

## 2017-06-21 RX ADMIN — TAMSULOSIN HYDROCHLORIDE 0.4 MG: 0.4 CAPSULE ORAL at 23:18

## 2017-06-21 RX ADMIN — ACETAMINOPHEN 1000 MG: 500 TABLET, FILM COATED ORAL at 12:03

## 2017-06-21 RX ADMIN — ACETAMINOPHEN 1000 MG: 500 TABLET, FILM COATED ORAL at 23:21

## 2017-06-21 RX ADMIN — SERTRALINE HYDROCHLORIDE 25 MG: 50 TABLET ORAL at 09:24

## 2017-06-21 RX ADMIN — MEMANTINE 10 MG: 5 TABLET ORAL at 23:20

## 2017-06-21 RX ADMIN — PREGABALIN 75 MG: 75 CAPSULE ORAL at 23:12

## 2017-06-21 RX ADMIN — INSULIN ASPART 2 UNITS: 100 INJECTION, SOLUTION INTRAVENOUS; SUBCUTANEOUS at 09:22

## 2017-06-21 RX ADMIN — OXYCODONE HYDROCHLORIDE 10 MG: 5 TABLET ORAL at 14:58

## 2017-06-21 RX ADMIN — Medication 1 TABLET: at 09:21

## 2017-06-21 RX ADMIN — INSULIN ASPART 2 UNITS: 100 INJECTION, SOLUTION INTRAVENOUS; SUBCUTANEOUS at 23:11

## 2017-06-21 RX ADMIN — DOCUSATE SODIUM AND SENNOSIDES 2 TABLET: 8.6; 5 TABLET, FILM COATED ORAL at 09:21

## 2017-06-21 RX ADMIN — METFORMIN HYDROCHLORIDE 500 MG: 500 TABLET ORAL at 17:57

## 2017-06-21 RX ADMIN — PREGABALIN 75 MG: 75 CAPSULE ORAL at 09:22

## 2017-06-21 RX ADMIN — METFORMIN HYDROCHLORIDE 500 MG: 500 TABLET ORAL at 09:21

## 2017-06-21 RX ADMIN — BISACODYL 10 MG: 5 TABLET, COATED ORAL at 06:25

## 2017-06-21 RX ADMIN — OXYCODONE HYDROCHLORIDE 10 MG: 5 TABLET ORAL at 10:48

## 2017-06-21 RX ADMIN — INSULIN ASPART 3 UNITS: 100 INJECTION, SOLUTION INTRAVENOUS; SUBCUTANEOUS at 12:00

## 2017-06-21 RX ADMIN — PANTOPRAZOLE SODIUM 40 MG: 40 TABLET, DELAYED RELEASE ORAL at 06:25

## 2017-06-21 NOTE — THERAPY TREATMENT NOTE
Acute Care - Physical Therapy Treatment Note   Jovita [facility]     Patient Name: Zach Gutierrez  : 1940  MRN: [id number]  Today's Date: 2017  Onset of Illness/Injury or Date of Surgery Date: 17  Date of Referral to PT: 17  Referring Physician: Dr Thrasher    Admit Date: 2017    Visit Dx:    ICD-10-CM ICD-9-CM   1. Primary osteoarthritis of right knee M17.11 715.16     Patient Active Problem List   Diagnosis   • Osteoarthritis of right knee               Adult Rehabilitation Note       17 1320 17 0924       Rehab Assessment/Intervention    Discipline physical therapist  -BP physical therapy assistant  -KM     Document Type therapy note (daily note)  -BP therapy note (daily note)  -KM     Subjective Information agree to therapy;no complaints  -BP agree to therapy;no complaints  -KM     Patient Effort, Rehab Treatment good  -BP good  -KM     Precautions/Limitations fall precautions  -BP brace on when up;fall precautions  -KM     Specific Treatment Considerations  pt still has IV and Ethan drain in.    -KM     Recorded by [BP] Andres Torre, PT [KM] Malka Greenberg PTA     Pain Assessment    Pain Assessment 0-10  -BP No/denies pain  -KM     Pain Score 4  -BP      Pain Type Acute pain;Surgical pain  -BP      Pain Location Knee  -BP      Pain Orientation Right  -BP      Pain Intervention(s) Repositioned;Cold applied  -BP      Response to Interventions tolerated  -BP      Recorded by [BP] Andres Torre, PT [KM] Malka Greenberg PTA     Cognitive Assessment/Intervention    Personal Safety Interventions gait belt;nonskid shoes/slippers when out of bed  -BP gait belt;nonskid shoes/slippers when out of bed  -KM     Recorded by [BP] Andres Torre, PT [KM] Malka Greenberg PTA     Bed Mobility, Assessment/Treatment    Bed Mobility, Comment patient up in chair  -BP pt up in chair  -KM     Recorded by [BP] Andres Torre, PT [KM] Malka Greenberg PTA     Transfer Assessment/Treatment     Transfers, Sit-Stand Phyllis stand by assist  -BP contact guard assist;minimum assist (75% patient effort);1 person + 1 person to manage equipment;verbal cues required  -KM     Transfers, Stand-Sit Phyllis contact guard assist  -BP contact guard assist;minimum assist (75% patient effort);1 person + 1 person to manage equipment;verbal cues required  -KM     Transfers, Sit-Stand-Sit, Assist Device rolling walker  -BP rolling walker  -KM     Transfer, Comment Verbal cues required for hand placement and AD placement  -BP pt requested to stand to try to go to the bathroom- opt able to stand and hold onto walker but required assist with urinall(pt unable to void notified nurse)  -KM     Recorded by [BP] Andres Torre, PT [KM] Malka Greenberg, PTA     Gait Assessment/Treatment    Gait, [place]yll[place] Level contact guard assist  -BP minimum assist (75% patient effort);1 person + 1 person to manage equipment;verbal cues required;nonverbal cues required (demo/gesture)  -KM     Gait, Assistive Device rolling walker  -BP rolling walker  -KM     Gait, Distance (Feet) 180  -  -KM     Gait, Gait Pattern Analysis swing-through gait  -BP      Gait, Gait Deviations forward flexed posture;alayna decreased;right:;antalgic;decreased heel strike;left:;step length decreased  -BP      Gait, Safety Issues  --   cues to stay with walker- at times too close or too far  -KM     Gait, Impairments pain  -BP      Gait, Comment Patient initially demonstrates step to gait pattern with slow gait speed. With verbal cues patient able to achieve step through gait with improved posture and gait speed. Patient requires verbal cues to navigate external obstacles and directional changes.   -BP at first with ambulation, pt had delay with placing foot on ground with each step.  Then pt ambulating at fast pace, required cues and assist to slow down- more assist with turns.  Pt easily distracted and required cues for safety.  knee immobilizer  on this am but pt doing well enough with SLR will try without knee immobilizer this pm.  -KM     Recorded by [BP] Andres Torre, PT [KM] Malka Greenberg PTA     Lower Body Dressing Assessment/Training    LB Dressing Assess/Train, Comment  pt requesting underwear and shorts on- required assist due to candie drain, etc.  OT present for part of session also  -KM     Recorded by  [KM] Malka Greenberg PTA     Therapy Exercises    Right Lower Extremity 15 reps;AROM:;heel slides;heel raises;hip abduction/adduction;hip flexion;LAQ;quad sets;SLR  -BP 10 reps;ankle pumps/circles;heel slides;hip abduction/adduction;quad sets;SAQ;SLR   gave written HEP to pt/ spouse  -KM     Recorded by [BP] Andres Torre, PT [KM] Malka Greenberg PTA     Positioning and Restraints    Pre-Treatment Position sitting in chair/recliner  -BP sitting in chair/recliner  -KM     Post Treatment Position chair  -BP chair  -KM     In Chair reclined;call light within reach;encouraged to call for assist;with family/caregiver  -BP notified nsg;reclined;encouraged to call for assist;with family/caregiver;legs elevated  -KM     Recorded by [BP] Andres Torre, PT [KM] Malka Greenberg PTA       User Key  (r) = Recorded By, (t) = Taken By, (c) = Cosigned By    Initials Name Effective Dates    KM Malka Greenberg PTA 08/11/15 -     BP Andres Torre, PT 12/01/15 -                 IP PT Goals       06/20/17 1444          Bed Mobility PT STG    Bed Mobility PT STG, Date Established 06/20/17  -      Bed Mobility PT STG, Time to Achieve 3 days  -      Bed Mobility PT STG, Activity Type all bed mobility  -      Bed Mobility PT STG, [place] Level supervision required  -      Transfer Training PT STG    Transfer Training PT STG, Date Established 06/20/17  -      Transfer Training PT STG, Time to Achieve 3 days  -      Transfer Training PT STG, Activity Type all transfers  -      Transfer Training PT STG, [place] Level supervision required  -       Transfer Training PT STG, Assist Device walker, rolling  -JW      Gait Training PT STG    Gait Training Goal PT STG, Date Established 06/20/17  -JW      Gait Training Goal PT STG, Time to Achieve 3 days  -JW      Gait Training Goal PT STG, [place] Level supervision required  -JW      Gait Training Goal PT STG, Assist Device walker, rolling  -JW      Gait Training Goal PT STG, Distance to Achieve 100  -JW      Stair Training PT STG    Stair Training Goal PT STG, Date Established 06/20/17  -JW      Stair Training Goal PT STG, Time to Achieve 3 days  -JW      Stair Training Goal PT STG, Number of Steps 2  -JW      Stair Training Goal PT STG, [place] Level minimum assist (75% patient effort)  -JW      Stair Training Goal PT STG, Assist Device 2 handrails  -JW      Patient Education PT STG    Patient Education PT STG, Date Established 06/20/17  -JW      Patient Education PT STG, Time to Achieve 3 days  -JW      Patient Education PT STG, Education Type written program;HEP  -JW      Patient Education PT STG, Education Understanding demonstrate adequately;verbalize understanding  -        User Key  (r) = Recorded By, (t) = Taken By, (c) = Cosigned By    Initials Name Provider Type    KASIE Araiza, PT Physical Therapist          Physical Therapy Education     Title: PT OT SLP Therapies (Done)     Topic: Physical Therapy (Done)     Point: Mobility training (Done)    Learning Progress Summary    Learner Readiness Method Response Comment Documented by Status   Patient Acceptance E VU  BP 06/21/17 1434 Done    Acceptance E,TB,D,H ORIN gave written Handout to pt and spouse.  Reviewed part of ex and will practice other ex futrue sessions.  06/21/17 1159 Done    Acceptance E VU  JW 06/20/17 1444 Done   Family Acceptance E VU  BP 06/21/17 1434 Done   Significant Other Acceptance E,TB,D,H ORIN gave written Handout to pt and spouse.  Reviewed part of ex and will practice other ex futrue sessions.  06/21/17 1159  Done               Point: Home exercise program (Done)    Learning Progress Summary    Learner Readiness Method Response Comment Documented by Status   Patient Acceptance E VU   06/21/17 1434 Done    Acceptance E,TB,D,H ORIN gave written Handout to pt and spouse.  Reviewed part of ex and will practice other ex futrue sessions.  06/21/17 1159 Done    Acceptance E VU   06/20/17 1444 Done   Family Acceptance E VU   06/21/17 1434 Done   Significant Other Acceptance E,TB,D,H ORIN gave written Handout to pt and spouse.  Reviewed part of ex and will practice other ex futrue sessions.  06/21/17 1159 Done                      User Key     Initials Effective Dates Name Provider Type Discipline     08/11/15 -  Malka Greenberg, PTA Physical Therapy Assistant PT     12/01/15 -  Andres Torre, PT Physical Therapist PT     12/01/15 -  Philomena Araiza, PT Physical Therapist PT                    PT Recommendation and Plan  Anticipated Equipment Needs At Discharge: front wheeled walker, bedside commode  Anticipated Discharge Disposition: home with home health  Planned Therapy Interventions: balance training, bed mobility training, gait training, home exercise program, strengthening, transfer training, stair training  PT Frequency: 2 times/day  Plan of Care Review  Plan Of Care Reviewed With: patient  Progress: improving  Outcome Summary/Follow up Plan: PT: Patient with improved safety with gait training however required verbal cues to navigate external obstacles and directional changes.           Outcome Measures       06/21/17 1320 06/21/17 0924 06/20/17 1312    How much help from another person do you currently need...    Turning from your back to your side while in flat bed without using bedrails? 3  -BP 3  -KM 3  -JW    Moving from lying on back to sitting on the side of a flat bed without bedrails? 3  -BP 3  -KM 3  -JW    Moving to and from a bed to a chair (including a wheelchair)? 3  -BP 3  -KM 1  -JW    Standing  up from a chair using your arms (e.g., wheelchair, bedside chair)? 3  -BP 3  -KM 1  -JW    Climbing 3-5 steps with a railing? 3  -BP 1  -KM 1  -JW    To walk in hospital room? 3  -BP 3  -KM 1  -JW    AM-PAC 6 Clicks Score 18  -BP 16  -KM 10  -JW    Functional Assessment    Outcome Measure Options AM-PAC 6 Clicks Basic Mobility (PT)  -BP AM-PAC 6 Clicks Basic Mobility (PT)  -KM AM-PAC 6 Clicks Basic Mobility (PT)  -JW      User Key  (r) = Recorded By, (t) = Taken By, (c) = Cosigned By    Initials Name Provider Type    HARLEEN Greenberg PTA Physical Therapy Assistant    BP Andres Torre, PT Physical Therapist    KASIE Araiza, PT Physical Therapist           Time Calculation:         PT Charges       06/21/17 1436 06/21/17 1203       Time Calculation    Start Time 1320  -BP 0924  -KM     Stop Time 1344  -BP 1005  -KM     Time Calculation (min) 24 min  -BP 41 min  -KM     PT Received On 06/21/17  -BP      PT - Next Appointment 06/22/17  -BP 06/21/17  -KM       User Key  (r) = Recorded By, (t) = Taken By, (c) = Cosigned By    Initials Name Provider Type    HARLEEN Greenberg PTA Physical Therapy Assistant    BP Andres Torre, PT Physical Therapist          Therapy Charges for Today     Code Description Service Date Service Provider Modifiers Qty    22410815293 HC PT THER PROC EA 15 MIN 6/21/2017 Andres Torre, PT GP 1    11279831724 HC GAIT TRAINING EA 15 MIN 6/21/2017 Andres Torre, PT GP 1          PT G-Codes  Outcome Measure Options: AM-PAC 6 Clicks Basic Mobility (PT)    Andres Torre, PT  6/21/2017

## 2017-06-21 NOTE — PLAN OF CARE
Problem: Patient Care Overview (Adult)  Goal: Plan of Care Review  Outcome: Ongoing (interventions implemented as appropriate)    06/21/17 0225   Coping/Psychosocial Response Interventions   Plan Of Care Reviewed With patient   Patient Care Overview   Progress no change   Outcome Evaluation   Outcome Summary/Follow up Plan pt still unsteady transfering to [facility], bed alarm on pain is well controlled         Problem: Knee Replacement, Total (Adult)  Goal: Signs and Symptoms of Listed Potential Problems Will be Absent or Manageable (Knee Replacement, Total)  Outcome: Ongoing (interventions implemented as appropriate)    06/21/17 0225   Knee Replacement, Total   Problems Assessed (Total Knee Replacement) all   Problems Present (Total Knee Replacement) situational response

## 2017-06-21 NOTE — PROGRESS NOTES
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 Zach Gutierrez is a 76 year old  male who has multiple medical problems as listed below. He was admitted by Dr. Thrasher from orthopedics as he had failed conservative treatment and was taken to OR and had Rt Total Knee Arthroplasty done yesterday. Dr Thrasher contacted Hospitalist service to address patient's nonsurgical medical issues.     Interval History and ROS:     Patient States resting comfortably  Patient Complaints: no new complaints as per daughter  Patient Denies:  No chest pain, shortness of breath or n/v  History taken from: Patient and his daughter      Objective    Vital Signs  Temp:  [96.3 °F (35.7 °C)-98.5 °F (36.9 °C)] 98.5 °F (36.9 °C)  Heart Rate:  [61-72] 71  Resp:  [18-20] 20  BP: (112-130)/(62-75) 112/62    Flowsheet Rows         First Filed Value    Admission Height  71\" (180.3 cm) Documented at 06/20/2017 1038    Admission Weight  200 lb 12.8 oz (91.1 kg) Documented at 06/20/2017 0627              Physical Exam:    Vital signs: As above   General: Patient appears stated age and in no pain or discomfort Well-nourished.  HEENT: Normocephalic. No scleral icterus    Neck: Supple. No thyromegaly or increased JVD.  Cardiovascular: Regular rate and rhythm. Normal S1, S2. No M/G/R  Chest: Clear to auscultation. No rhonchi or rales.   Abdominal: Soft. Nontender, nondistended.  Extremities: S/P Rt TKA. No cyanosis, clubbing, or edema. No rash or lesions. + pedal pulses   Neurologic: No focal neurologic deficits.    Musculoskeletal: Right TKA.   Skin: Warm and dry with no rash            Results Review:     I reviewed the patient's new clinical " results.    Lab Results (last 24 hours)     Procedure Component Value Units Date/Time    POC Glucose Fingerstick [[id number]]  (Abnormal) Collected:  06/20/17 2014    Specimen:  Blood Updated:  06/20/17 2021     Glucose 176 (H) mg/dL     Narrative:       Meter: [id number] : [id number] Zoila TEMPLE    CBC & Differential [[id number]] Collected:  06/21/17 0426    Specimen:  Blood Updated:  06/21/17 0527    Narrative:       The following orders were created for panel order CBC & Differential.  Procedure                               Abnormality         Status                     ---------                               -----------         ------                     CBC Auto Differential[[id number]]        Abnormal            Final result                 Please view results for these tests on the individual orders.    CBC Auto Differential [[id number]]  (Abnormal) Collected:  06/21/17 0426    Specimen:  Blood Updated:  06/21/17 0527     WBC 7.90 10*3/mm3      RBC 3.83 (L) 10*6/mm3      Hemoglobin 12.1 (L) g/dL      Hematocrit 34.9 (L) %      MCV 91.1 fL      MCH 31.6 (H) pg      MCHC 34.7 g/dL      RDW 12.4 %      RDW-SD 40.8 fl      MPV 9.0 fL      Platelets 221 10*3/mm3      Neutrophil % 80.9 (H) %      Lymphocyte % 10.3 (L) %      Monocyte % 7.5 %      Eosinophil % 0.5 %      Basophil % 0.3 %      Immature Grans % 0.5 %      Neutrophils, Absolute 6.40 10*3/mm3      Lymphocytes, Absolute 0.81 10*3/mm3      Monocytes, Absolute 0.59 10*3/mm3      Eosinophils, Absolute 0.04 (L) 10*3/mm3      Basophils, Absolute 0.02 10*3/mm3      Immature Grans, Absolute 0.04 (H) 10*3/mm3      nRBC 0.0 /100 WBC     aPTT [[id number]]  (Normal) Collected:  06/21/17 0426    Specimen:  Blood Updated:  06/21/17 0543     PTT 31.5 seconds     Narrative:       PTT = The equivalent PTT values for the therapeutic range of heparin levels at 0.1 to 0.7 U/ml are 53 to 110 seconds.    POC Glucose Fingerstick [[id number]]  (Abnormal) Collected:   06/21/17 0711    Specimen:  Blood Updated:  06/21/17 0719     Glucose 163 (H) mg/dL     Narrative:       Meter: [id number] : [id number] Shelly Pierreie NURSING ASSISTANT    POC Glucose Fingerstick [[id number]]  (Abnormal) Collected:  06/21/17 1152    Specimen:  Blood Updated:  06/21/17 1200     Glucose 206 (H) mg/dL     Narrative:       Meter: [id number] : [id number] Shelly Pierreie NURSING ASSISTANT    POC Glucose Fingerstick [[id number]]  (Abnormal) Collected:  06/21/17 1640    Specimen:  Blood Updated:  06/21/17 1647     Glucose 207 (H) mg/dL     Narrative:       Meter: [id number] : [id number] Shelly Garcia NURSING ASSISTANT          Imaging Results (last 24 hours)     ** No results found for the last 24 hours. **          Xray not reviewed personally by physician.      ECG not reviewed personally by physician  ECG/EMG Results (most recent)     None          Medication Review:   I have reviewed the patient's current medication list    Current Facility-Administered Medications:   •  acetaminophen (TYLENOL) tablet 1,000 mg, 1,000 mg, Oral, Q6H, Bj Thrasher MD, 1,000 mg at 06/21/17 1757  •  aspirin tablet 325 mg, 325 mg, Oral, Q12H, Bj Thrasher MD, 325 mg at 06/21/17 1203  •  bisacodyl (DULCOLAX) EC tablet 10 mg, 10 mg, Oral, Daily PRN, Bj Thrasher MD, 10 mg at 06/21/17 0625  •  bisacodyl (DULCOLAX) suppository 10 mg, 10 mg, Rectal, Daily PRN, Bj Thrasher MD  •  dextrose (D50W) solution 25 g, 25 g, Intravenous, Q15 Min PRN, Yuniel Gonzalez MD  •  dextrose (GLUTOSE) oral gel 15 g, 15 g, Oral, Q15 Min PRN, Yuniel Gonzalez MD  •  donepezil (ARICEPT) tablet 10 mg, 10 mg, Oral, Nightly, Bj Thrasher MD, 10 mg at 06/20/17 2059  •  finasteride (PROSCAR) tablet 5 mg, 5 mg, Oral, Nightly, Bj Thrasher MD, 5 mg at 06/20/17 2059  •  glucagon (GLUCAGEN) injection 1 mg, 1 mg, Subcutaneous, Q15 Min PRN, Yuniel Gonzalez MD  •  insulin aspart (novoLOG) injection 0-7 Units, 0-7 Units, Subcutaneous, 4x Daily AC & at  Bedtime, Yuniel Gonzalez MD, 3 Units at 06/21/17 1757  •  lactated ringers infusion, 30 mL/hr, Intravenous, Continuous, Bj Thrasher MD, Last Rate: 30 mL/hr at 06/20/17 1708, 30 mL/hr at 06/20/17 1708  •  magnesium hydroxide (MILK OF MAGNESIA) 400 MG/5ML suspension 10 mL, 10 mL, Oral, Daily PRN, Bj Thrasher MD  •  memantine (NAMENDA) tablet 10 mg, 10 mg, Oral, Q12H, Bj Thrasher MD, 10 mg at 06/20/17 2059  •  metFORMIN (GLUCOPHAGE) tablet 500 mg, 500 mg, Oral, BID With Meals, Bj Thrasher MD, 500 mg at 06/21/17 1757  •  multivitamin with minerals 1 tablet, 1 tablet, Oral, Daily, Bj Thrasher MD, 1 tablet at 06/21/17 0921  •  oxyCODONE (ROXICODONE) immediate release tablet 10 mg, 10 mg, Oral, Q4H PRN, Bj Thrasher MD, 10 mg at 06/21/17 1458  •  pantoprazole (PROTONIX) EC tablet 40 mg, 40 mg, Oral, Q AM, Yuniel Gonzalez MD, 40 mg at 06/21/17 0625  •  pregabalin (LYRICA) capsule 75 mg, 75 mg, Oral, Q12H, Bj Thrasher MD, 75 mg at 06/21/17 0922  •  sennosides-docusate sodium (SENOKOT-S) 8.6-50 MG tablet 2 tablet, 2 tablet, Oral, BID, Bj Thrasher MD, 2 tablet at 06/21/17 1757  •  sertraline (ZOLOFT) tablet 25 mg, 25 mg, Oral, Daily, Bj Thrasher MD, 25 mg at 06/21/17 0924  •  tamsulosin (FLOMAX) 24 hr capsule 0.4 mg, 0.4 mg, Oral, Nightly, Bj Thrasher MD, 0.4 mg at 06/20/17 2059      Assessment/Plan       ASSESSMENT AND PLAN:       SUMMARY:    ?   PROPHYLAXIS:   -DVT Prophylaxis: On SCDs   -Watkins Catheter: Not indicated at this time    NUTRITION AND FLUIDS:  -Diet/ Nutrition: Regular, cardiac, consistent carbohydrate diet   -Fluid Status/Electrolytes: LR 30 mL/Hr      SOCIAL ISSUES:    -Behavioral/ Agitation Issues: NONE   -Social Issues: Lives at home with his family.       THERAPEUTIC:    -ANTIBIOTICS: N/A  -PAIN MANAGEMENT: Roxicodone              PLAN:    -Labs and diagnostic tests reviewed: Gluc 207, WBC 7.9, Hb 12.1, Plt 221, 80.9 N, 10.3 L    -Diagnostic tests reviewed: NONE done in last 24  hrs    -Any new recommendations: As per Dr Thrasher    -New Labs ordered: As per Dr Thrasher    -New diagnostic tests ordered: N/A    -Any changes in medications: N/A    -Discharge planning issues: Patient should be able to go back home Vs NH once ready for discharge    -Placement issues: N/A    -Patient is clinically and hemodynamically stable    -To continue current management and supportive care    -Will follow patient closely    -Nothing new to add for right now         DIAGNOSES:     PRIMARY DIAGNOSES:     1) HTN: Last /62. Not on any antihypertensives     2) Type II DM: On Metformin     3) Depression: On Zoloft     4) DJD: Hx noted     5) ZENA: Hx noted     6) Dementia: On Aricept and Namenda     7) Former smoker: Hx noted     8) BPH: On Flomax and Proscar     9) S/P Rt Total Knee Arthroplasty done today by Dr Thrasher     9) DVT Prophylaxis: On SCDs         SECONDARY DIAGNOSES:      As above     SURGICAL DIAGNOSES:        As per Problem List            Plan for disposition: Home Vs NH for rehab services    Yuniel Gonzalez MD  06/21/17  6:45 PM            Yuniel Gonzalez M.D., FACP  Internal Medicine/ Hospitalist          Time:

## 2017-06-21 NOTE — PLAN OF CARE
Problem: Patient Care Overview (Adult)  Goal: Plan of Care Review    06/21/17 0930   Coping/Psychosocial Response Interventions   Plan Of Care Reviewed With patient   Outcome Evaluation   Outcome Summary/Follow up Plan OT evaluation completed. pt cga- min assist for functional transfers with rolling walker. pt requires cues for safety . pt is mod assist for lb adls. pt will benefit from skilled ot services for adls and ae education x 1 more visit. pt being dischraged home with family tomorrow. family able to assist if needed         Problem: Inpatient Occupational Therapy  Goal: Patient Education Goal STG- OT    06/21/17 1504   Patient Education OT STG   Patient Education OT STG, Date Established 06/21/17   Patient Education OT STG, Time to Achieve 2 days   Patient Education OT STG, Education Type adaptive equipment mgmt   Patient Education OT STG, Education Understanding independent   Patient Education OT STG, Additional Goal pt/ caregiver I with ae managment for lb adls

## 2017-06-21 NOTE — THERAPY EVALUATION
Acute Care - Occupational Therapy Initial Evaluation   Jovita Greene     Patient Name: Zach Gutierrez  : 1940  MRN: [id number]  Today's Date: 2017  Onset of Illness/Injury or Date of Surgery Date: 17  Date of Referral to OT: 17  Referring Physician: Price    Admit Date: 2017       ICD-10-CM ICD-9-CM   1. Primary osteoarthritis of right knee M17.11 715.16     Patient Active Problem List   Diagnosis   • Osteoarthritis of right knee     Past Medical History:   Diagnosis Date   • Arthritis    • Dementia    • Diabetes    • Knee sprain    • ZENA (obstructive sleep apnea)     cpap     Past Surgical History:   Procedure Laterality Date   • COLONOSCOPY N/A 2017    Procedure: COLONOSCOPY;  Surgeon: Srikanth Payan MD;  Location: [facility];  Service:    • HERNIA REPAIR Bilateral     inguinal   • [place] TOTAL KNEE ARTHROPLASTY Right 2017    Procedure: TOTAL KNEE ARTHROPLASTY hima orthoalign antibiotic cement 7fr hemovac placement;  Surgeon: Bj Thrasher MD;  Location: [facility] OR;  Service: Orthopedics   • SHOULDER SURGERY Right     RCR          OT ASSESSMENT FLOWSHEET (last 72 hours)      OT Evaluation       17 1506 17 1320 17 0930 17 0924 17 1346    Rehab Evaluation    Document Type  therapy note (daily note)  -BP evaluation  -JJ therapy note (daily note)  -KM     Subjective Information  agree to therapy;no complaints  -BP agree to therapy;complains of;pain  -JJ agree to therapy;no complaints  -KM     Patient Effort, Rehab Treatment  good  -BP good  -JJ good  -KM     General Information    Patient Profile Review   yes  -JJ      Onset of Illness/Injury or Date of Surgery Date   17  -KAMJ      Referring Physician   Price  -JULIANA      General Observations   pt sitting in bedside chair, spouse and PT in room, agreed to evaluation  -JJ      Pertinent History Of Current Problem   pt with worsening R knee pain. sp R TKA  -JJ      Precautions/Limitations   fall precautions  -BP brace on when up;fall precautions  -JJ brace on when up;fall precautions  -KM     Prior Level of Function   independent:;all household mobility;ADL's  -JJ      Equipment Currently Used at Home     glucometer;bipap/ cpap   Patient has a walking stick  -ML    Plans/Goals Discussed With   patient and family;agreed upon  -JJ      Risks Reviewed   patient and family:;increased discomfort  -JJ      Benefits Reviewed   patient and family:;improve function;increase independence  -JJ      Barriers to Rehab   hearing deficit  -JJ      Living Environment    Lives With   spouse  -JJ      Living Arrangements   house  -JJ      Home Accessibility   bed and bath on same level;stairs within home  -JJ  bed and bath on same level;stairs within home;stairs (1 railing present)  -ML    Number of Stairs to Enter Home   3  -JJ  3  -ML    Stair Railings at Home   outside, present at both sides  -JJ      Type of Financial/Environmental Concern     none  -ML    Transportation Available     family or friend will provide;car  -ML    Living Environment Comment   pt reports he lives in one story home with basement but does not have to go to basement. pt states spouse will be available to assist at home when needed  -JJ      Clinical Impression    Date of Referral to OT   06/21/17  -JJ      OT Diagnosis   decreased adl sp knee sx  -JJ      Prognosis   good  -JJ      Impairments Found (describe specific impairments)   muscle performance  -JJ      Patient/Family Goals Statement   pt and spouse state pt going home with family tomorrow  -JJ      Criteria for Skilled Therapeutic Interventions Met   yes;treatment indicated  -JJ      Rehab Potential   good, to achieve stated therapy goals  -JJ      Therapy Frequency   --   x 1 more visit  -JJ      Predicted Duration of Therapy Intervention (days/wks)   x 1 more visit  -JJ      Anticipated Equipment Needs At Discharge   --   pt may benefit from long handled ae  -JJ      Anticipated  Discharge Disposition home with assist  -JJ  home with assist  -JJ      Pain Assessment    Pain Assessment  0-10  -BP No/denies pain  -JJ No/denies pain  -KM     Pain Score  4  -BP       Pain Type  Acute pain;Surgical pain  -BP       Pain Location  Knee  -BP       Pain Orientation  Right  -BP       Pain Intervention(s)  Repositioned;Cold applied  -BP       Response to Interventions  tolerated  -BP       Cognitive Assessment/Intervention    Personal Safety Interventions  gait belt;nonskid shoes/slippers when out of bed  -BP gait belt;nonskid shoes/slippers when out of bed  -JJ gait belt;nonskid shoes/slippers when out of bed  -KM     ROM (Range of Motion)    General ROM Detail   B UE AROm WFL  -JJ      MMT (Manual Muscle Testing)    General MMT Assessment Detail   B UE MMT WFL  -      Bed Mobility, Assessment/Treatment    Bed Mobility, Comment  patient up in chair  -BP pt up in chair  -JJ pt up in chair  -KM     Transfer Assessment/Treatment    Transfers, Sit-Stand [place]  stand by assist  -BP contact guard assist;minimum assist (75% patient effort);verbal cues required  -JJ contact guard assist;minimum assist (75% patient effort);1 person + 1 person to manage equipment;verbal cues required  -KM     Transfers, Stand-Sit [place]  contact guard assist  -BP contact guard assist;minimum assist (75% patient effort);verbal cues required  -JJ contact guard assist;minimum assist (75% patient effort);1 person + 1 person to manage equipment;verbal cues required  -KM     Transfers, Sit-Stand-Sit, Assist Device  rolling walker  -BP rolling walker  -JJ rolling walker  -KM     Transfer, Comment  Verbal cues required for hand placement and AD placement  -BP pt required verbal cues for safety and hand placement  -JJ pt requested to stand to try to go to the bathroom- opt able to stand and hold onto walker but required assist with urinall(pt unable to void notified nurse)  -KM     Functional Mobility    Functional  Mobility- Ind. Level   contact guard assist;verbal cues required  -      Functional Mobility- Device   rolling walker  -      Functional Mobility-Distance (Feet)   --   in hallway  -      Functional Mobility- Comment   pt required cues for safety  -      Lower Body Dressing Assessment/Training    LB Dressing Assess/Train, Comment   pt required assist to don pants, underwear and manage equipment  - pt requesting underwear and shorts on- required assist due to candie drain, etc.  OT present for part of session also  -     Toileting Assessment/Training    Toileting Assess/Train, Comment   required assist with urinal in standing  -      Therapy Exercises    Right Lower Extremity  15 reps;AROM:;heel slides;heel raises;hip abduction/adduction;hip flexion;LAQ;quad sets;SLR  -BP  10 reps;ankle pumps/circles;heel slides;hip abduction/adduction;quad sets;SAQ;SLR   gave written HEP to pt/ spouse  -     General Therapy Interventions    Planned Therapy Interventions   adaptive equipment training;ADL retraining  -      Positioning and Restraints    Pre-Treatment Position  sitting in chair/recliner  - sitting in chair/recliner  - sitting in chair/recliner  -     Post Treatment Position  chair  - chair  - chair  -     In Chair  reclined;call light within reach;encouraged to call for assist;with family/caregiver  - reclined;encouraged to call for assist;with family/caregiver;legs elevated  - notified nsg;reclined;encouraged to call for assist;with family/caregiver;legs elevated  -       06/20/17 1312 06/20/17 1103 06/20/17 0625          Rehab Evaluation    Document Type evaluation  -JW        Subjective Information agree to therapy;complains of;weakness  -JW        Patient Effort, Rehab Treatment good  -JW        Symptoms Noted During/After Treatment none  -JW        General Information    Patient Profile Review yes  -JW        Onset of Illness/Injury or Date of Surgery Date 06/20/17  -JW         Referring Physician Dr Thrasher  -JW        General Observations pt supine, immobilizer and drain in place.  family present.  pt agreeable to evaluation  -JW        Pertinent History Of Current Problem pt with worsening knee pain.  pt s/p right TKA  -JW        Precautions/Limitations fall precautions;brace on when up  -JW        Prior Level of Function independent:;all household mobility;community mobility;ADL's  -JW        Equipment Currently Used at Home cane, straight  -JW  cane, straight;bipap/ cpap  -CP      Plans/Goals Discussed With patient and family;agreed upon  -JW        Risks Reviewed patient and family:;increased discomfort  -JW        Benefits Reviewed patient and family:;improve function;increase independence;increase strength;decrease pain;increase balance  -JW        Barriers to Rehab hearing deficit  -JW        Living Environment    Lives With spouse  -JW  spouse  -CP      Living Arrangements house  -JW  house  -CP      Home Accessibility stairs to enter home  -JW  no concerns  -CP      Number of Stairs to Enter Home 2  -JW        Stair Railings at Home outside, present at both sides  -JW        Living Environment Comment pt reports he lives in 1 story home with basement but does not have to go into basement.  pt states spouse will be available as needed upon return home.  -JW        Functional Level Prior    Ambulation   0-->independent  -CP      Transferring   0-->independent  -CP      Toileting   0-->independent  -CP      Bathing   0-->independent  -CP      Dressing   0-->independent  -CP      Eating   0-->independent  -CP      Communication   0-->understands/communicates without difficulty  -CP      Swallowing   0-->swallows foods/liquids without difficulty  -CP      Pain Assessment    Pain Assessment No/denies pain  -JW        Cognitive Assessment/Intervention    Current Cognitive/Communication Assessment functional  -JW        Orientation Status oriented to;person;place;time  -JW        Follows  Commands/Answers Questions able to follow single-step instructions;100% of the time;needs increased time  -JW        Personal Safety WNL/WFL  -JW        Personal Safety Interventions gait belt;nonskid shoes/slippers when out of bed  -JW        ROM (Range of Motion)    General ROM other (see comments)   left LE WFL, right LE ROM limited approx 50%   -JW        General ROM Detail pt with complaints of numbness throughout distal right LE  -JW        Muscle Tone Assessment    Muscle Tone Assessment  Bilateral Upper Extremities;RLE;LLE  -KZ       Bilateral Upper Extremities Muscle Tone Assessment  associated movements noted  -KZ       LLE Muscle Tone Assessment  associated movements noted  -KZ       RLE Muscle Tone Assessment  moderately decreased tone  -KZ       Mobility Assessment/Training    Extremity Weight-Bearing Status right lower extremity  -JW        Right Lower Extremity Weight-Bearing weight-bearing as tolerated  -JW        Bed Mobility, Assessment/Treatment    Bed Mobility, Assistive Device bed rails;head of bed elevated  -JW        Bed Mob, Supine to Sit, [place] minimum assist (75% patient effort);verbal cues required  -        Bed Mob, Sit to Supine, [place] moderate assist (50% patient effort)   assist for LEs into bed  -JW        Bed Mobility, Comment pt requires increased time to complete scooting to EOB  -JW        Transfer Assessment/Treatment    Transfers, Sit-Stand [place] moderate assist (50% patient effort);2 person assist required  -JW        Transfers, Stand-Sit [place] minimum assist (75% patient effort);moderate assist (50% patient effort);2 person assist required   assist for LE placement prior to returning to sitting  -JW        Transfers, Sit-Stand-Sit, Assist Device rolling walker  -JW        Transfer, Comment pt able to perform standing x2 trials from elevated bed surface.  Patient requires assistance for right LE foot placement prior to returning to sitting.  pt  with episodes of knee buckling in standing.  -        Sensory Assessment/Intervention    Sensory Impairment --   pt reports numbness/tingling in right LE  -JW        Positioning and Restraints    Pre-Treatment Position in bed  -JW        Post Treatment Position bed  -JW        In Bed supine;call light within reach;encouraged to call for assist;notified nsg;with family/caregiver  -          User Key  (r) = Recorded By, (t) = Taken By, (c) = Cosigned By    Initials Name Effective Dates    KM Malka Greenberg, PTA 08/11/15 -     JJ Whitney Sykes, OTR 06/22/16 -     ML Heike Plata, RN 06/12/17 -     BP Andres Torre, PT 12/01/15 -     CP Maddi Smith, RN 06/16/16 -     JW Philomena Araiza, PT 12/01/15 -     AMINA Lopez, DREW 06/16/16 -            Occupational Therapy Education     Title: PT OT SLP Therapies (Done)     Topic: Occupational Therapy (Done)     Point: ADL training (Done)    Description: Instruct learner(s) on proper safety adaptation and remediation techniques during self care or transfers.   Instruct in proper use of assistive devices.    Learning Progress Summary    Learner Readiness Method Response Comment Documented by Status   Patient Acceptance E VU pt and spouse educated on adls, long handeld ae and safety with functional transfers  06/21/17 1503 Done   Significant Other Acceptance E VU pt and spouse educated on adls, long handeld ae and safety with functional transfers  06/21/17 1503 Done               Point: Precautions (Done)    Description: Instruct learner(s) on prescribed precautions during self-care and functional transfers.    Learning Progress Summary    Learner Readiness Method Response Comment Documented by Status   Patient Acceptance E VU pt and spouse educated on adls, long handeld ae and safety with functional transfers  06/21/17 1503 Done   Significant Other Acceptance E VU pt and spouse educated on adls, long handeld ae and safety with functional transfers   06/21/17 1503 Done                      User Key     Initials Effective Dates Name Provider Type Discipline     06/22/16 -  Whitney Sykes OTR Occupational Therapist OT                  OT Recommendation and Plan  Anticipated Equipment Needs At Discharge:  (pt may benefit from long handled ae)  Anticipated Discharge Disposition: home with assist  Planned Therapy Interventions: adaptive equipment training, ADL retraining  Therapy Frequency:  (x 1 more visit)  Plan of Care Review  Plan Of Care Reviewed With: patient  Outcome Summary/Follow up Plan: OT evaluation completed. pt cga- min assist for functional transfers with rolling walker. pt requires cues for safety . pt is mod assist for lb adls. pt will benefit from skilled ot services for adls  and ae education x 1 more visit. pt being dischraged home with family tomorrow. family able to assist if needed          OT Goals       06/21/17 1504          Patient Education OT STG    Patient Education OT STG, Date Established 06/21/17  -      Patient Education OT STG, Time to Achieve 2 days  -      Patient Education OT STG, Education Type adaptive equipment mgmt  -      Patient Education OT STG, Education Understanding independent  -      Patient Education OT STG, Additional Goal pt/ caregiver I with ae managment for lb adls  -        User Key  (r) = Recorded By, (t) = Taken By, (c) = Cosigned By    Initials Name Provider Type     ZAIRA Easley Occupational Therapist                Outcome Measures       06/21/17 1320 06/21/17 0930 06/21/17 0924    How much help from another person do you currently need...    Turning from your back to your side while in flat bed without using bedrails? 3  -BP  3  -KM    Moving from lying on back to sitting on the side of a flat bed without bedrails? 3  -BP  3  -KM    Moving to and from a bed to a chair (including a wheelchair)? 3  -BP  3  -KM    Standing up from a chair using your arms (e.g., wheelchair,  bedside chair)? 3  -BP  3  -KM    Climbing 3-5 steps with a railing? 3  -BP  1  -KM    To walk in hospital room? 3  -BP  3  -KM    AM-PAC 6 Clicks Score 18  -BP  16  -KM    How much help from another is currently needed...    Putting on and taking off regular lower body clothing?  2  -JJ     Bathing (including washing, rinsing, and drying)  2  -JJ     Toileting (which includes using toilet bed pan or urinal)  3  -JJ     Putting on and taking off regular upper body clothing  4  -JJ     Taking care of personal grooming (such as brushing teeth)  4  -JJ     Eating meals  4  -JJ     Score  19  -JJ     Functional Assessment    Outcome Measure Options AM-PAC 6 Clicks Basic Mobility (PT)  -BP AM-PAC 6 Clicks Daily Activity (OT)  -JJ AM-PAC 6 Clicks Basic Mobility (PT)  -KM      06/20/17 1312          How much help from another person do you currently need...    Turning from your back to your side while in flat bed without using bedrails? 3  -JW      Moving from lying on back to sitting on the side of a flat bed without bedrails? 3  -JW      Moving to and from a bed to a chair (including a wheelchair)? 1  -JW      Standing up from a chair using your arms (e.g., wheelchair, bedside chair)? 1  -JW      Climbing 3-5 steps with a railing? 1  -JW      To walk in hospital room? 1  -JW      AM-PAC 6 Clicks Score 10  -JW      Functional Assessment    Outcome Measure Options AM-PAC 6 Clicks Basic Mobility (PT)  -JW        User Key  (r) = Recorded By, (t) = Taken By, (c) = Cosigned By    Initials Name Provider Type    HARLEEN Greenberg, ALANA Physical Therapy Assistant    JKAM Sykes, OTR Occupational Therapist    BP Andres Torre, PT Physical Therapist    JW Philomena Araiza, PT Physical Therapist          Time Calculation:   OT Start Time: 0930    Therapy Charges for Today     Code Description Service Date Service Provider Modifiers Qty    [id number]  OT EVAL LOW COMPLEXITY 2 6/21/2017 Whitney Sykes, OTR GO 1                Whitney Sykes, OTR  6/21/2017

## 2017-06-21 NOTE — PROGRESS NOTES
Orthopedic Progress Note   Chief Complaint: Status post right total knee      Subjective     Interval History: Patient postop day 1 was doing well this morning is afebrile hemodynamically stable.  Hemoglobin is 12.1.  Has had 500 cc a Hemovac drainage is stable.  Minimal pain          Objective     Vital Signs  Temp:  [96.3 °F (35.7 °C)-98.4 °F (36.9 °C)] 97.6 °F (36.4 °C)  Heart Rate:  [46-72] 67  Resp:  [12-20] 18  BP: (106-134)/(55-88) 118/65  Body mass index is 27.89 kg/(m^2).    Intake/Output Summary (Last 24 hours) at 06/21/17 0645  Last data filed at 06/21/17 0600   Gross per 24 hour   Intake           1506.5 ml   Output             1310 ml   Net            196.5 ml     I/O this shift:  In: -   Out: 610 [Urine:450; Other:160]       Physical Exam:   General: patient awake, alert and cooperative   Cardiovascular: regular rhythm and rate   Pulm: clear to auscultation bilaterally   Abdomen: Benign.  Soft bowel sounds   Extremities: Right leg is good distal pulses no motor deficit.  No sensory loss.  Dressing is dry.   Neurologic: Normal mood and behavior     Results Review:     I reviewed the patient's new clinical results.      WBC No results found for: WBCS   HGB Hemoglobin   Date Value Ref Range Status   06/21/2017 12.1 (L) 14.0 - 18.0 g/dL Final      HCT Hematocrit   Date Value Ref Range Status   06/21/2017 34.9 (L) 42.0 - 52.0 % Final      Platlets No results found for: LABPLAT     PT/INR:  No results found for: PROTIME/No results found for: INR    Sodium No results found for: NA   Potassium No results found for: K   Chloride No results found for: CL   Bicarbonate No results found for: PLASMABICARB   BUN No results found for: BUN   Creatinine No results found for: CREATININE   Calcium No results found for: CALCIUM   Magnesium  AST  ALT  Bilirubin, Total  AlkPhos  Albumin    Amylase  Lipase    Radiology: No results found for: MG  No components found for: AST.*  No components found for: ALT.*  No components  found for: BILIRUBIN, TOTAL.*    No components found for: ALKPHOS.*  No components found for: ALBUMIN.*      No components found for: AMYLASE.*  No components found for: LIPASE.*            Imaging Results (most recent)     Procedure Component Value Units Date/Time    XR Knee 1 or 2 View Right [[id number]] Collected:  06/20/17 0951     Updated:  06/20/17 0953    Narrative:       POSTOP RIGHT KNEE SERIES, 06/20/2017::     HISTORY:  Postop knee arthroplasty surgery.     TECHNIQUE:  AP and crosstable lateral radiographs of the right knee were obtained  portably following surgery.     FINDINGS:  Total knee arthroplasty components are well-positioned postoperatively.  No visible fracture or dislocation.       Impression:       Satisfactory postoperative appearance following right total knee  arthroplasty.     This report was finalized on 6/20/2017 9:51 AM by Dr. Leonid Munoz MD.                  lactated ringers 30 mL/hr Last Rate: 30 mL/hr (06/20/17 1708)         Assessment/Plan     Patient Active Problem List   Diagnosis Code   • Osteoarthritis of right knee M17.9       DC Hemovac.  Continue PT OT.  Home in the next 24-48 hours.      Bj Thrasher MD  06/21/17  6:45 AM

## 2017-06-21 NOTE — PLAN OF CARE
Problem: Patient Care Overview (Adult)  Goal: Plan of Care Review  Outcome: Ongoing (interventions implemented as appropriate)    06/21/17 3127   Coping/Psychosocial Response Interventions   Plan Of Care Reviewed With patient   Patient Care Overview   Progress improving   Outcome Evaluation   Outcome Summary/Follow up Plan pt ambulated in [place] and sat in chair for most of day. reports minimal pain. does require cues and reminders. hemovac d/c this am. home soon.         Problem: Perioperative Period (Adult)  Goal: Signs and Symptoms of Listed Potential Problems Will be Absent or Manageable (Perioperative Period)  Outcome: Ongoing (interventions implemented as appropriate)    Problem: Knee Replacement, Total (Adult)  Goal: Signs and Symptoms of Listed Potential Problems Will be Absent or Manageable (Knee Replacement, Total)  Outcome: Ongoing (interventions implemented as appropriate)

## 2017-06-21 NOTE — THERAPY TREATMENT NOTE
Acute Care - Physical Therapy Treatment Note   [place]     Patient Name: Zach Gutierrez  : 1940  MRN: [id number]  Today's Date: 2017  Onset of Illness/Injury or Date of Surgery Date: 17  Date of Referral to PT: 17  Referring Physician: Dr Thrasher    Admit Date: 2017    Visit Dx:    ICD-10-CM ICD-9-CM   1. Primary osteoarthritis of right knee M17.11 715.16     Patient Active Problem List   Diagnosis   • Osteoarthritis of right knee               Adult Rehabilitation Note       17 0924          Rehab Assessment/Intervention    Discipline physical therapy assistant  -KM      Document Type therapy note (daily note)  -KM      Subjective Information agree to therapy;no complaints  -KM      Patient Effort, Rehab Treatment good  -KM      Precautions/Limitations brace on when up;fall precautions  -KM      Specific Treatment Considerations pt still has IV and Ethan drain in.    -KM      Recorded by [KM] Malka Greenberg PTA      Pain Assessment    Pain Assessment No/denies pain  -KM      Recorded by [KM] Malka Greenberg PTA      Cognitive Assessment/Intervention    Personal Safety Interventions gait belt;nonskid shoes/slippers when out of bed  -KM      Recorded by [KM] Malka Greenberg PTA      Bed Mobility, Assessment/Treatment    Bed Mobility, Comment pt up in chair  -KM      Recorded by [KM] Malka Greenberg PTA      Transfer Assessment/Treatment    Transfers, Sit-Stand [place] contact guard assist;minimum assist (75% patient effort);1 person + 1 person to manage equipment;verbal cues required  -KM      Transfers, Stand-Sit [place] contact guard assist;minimum assist (75% patient effort);1 person + 1 person to manage equipment;verbal cues required  -KM      Transfers, Sit-Stand-Sit, Assist Device rolling walker  -KM      Transfer, Comment pt requested to stand to try to go to the bathroom- opt able to stand and hold onto walker but required assist with urinall(pt unable to void  notified nurse)  -KM      Recorded by [KM] Malka Greenberg PTA      Gait Assessment/Treatment    Gait, [place] Level minimum assist (75% patient effort);1 person + 1 person to manage equipment;verbal cues required;nonverbal cues required (demo/gesture)  -KM      Gait, Assistive Device rolling walker  -KM      Gait, Distance (Feet) 210  -KM      Gait, Safety Issues --   cues to stay with walker- at times too close or too far  -KM      Gait, Comment at first with ambulation, pt had delay with placing foot on ground with each step.  Then pt ambulating at fast pace, required cues and assist to slow down- more assist with turns.  Pt easily distracted and required cues for safety.  knee immobilizer on this am but pt doing well enough with SLR will try without knee immobilizer this pm.  -KM      Recorded by [HARLEEN] Malka Greenberg PTA      Lower Body Dressing Assessment/Training    LB Dressing Assess/Train, Comment pt requesting underwear and shorts on- required assist due to candie drain, etc.  OT present for part of session also  -KM      Recorded by [HARLEEN] Malka Greenberg PTA      Therapy Exercises    Right Lower Extremity 10 reps;ankle pumps/circles;heel slides;hip abduction/adduction;quad sets;SAQ;SLR   gave written HEP to pt/ spouse  -KM      Recorded by [HARLEEN] Malka Greenberg PTA      Positioning and Restraints    Pre-Treatment Position sitting in chair/recliner  -KM      Post Treatment Position chair  -KM      In Chair notified nsg;reclined;encouraged to call for assist;with family/caregiver;legs elevated  -KM      Recorded by [HARLEEN] Malka Greenberg PTA        User Key  (r) = Recorded By, (t) = Taken By, (c) = Cosigned By    Initials Name Effective Dates    KM Malka Greenberg PTA 08/11/15 -                 IP PT Goals       06/20/17 1444          Bed Mobility PT STG    Bed Mobility PT STG, Date Established 06/20/17  -JW      Bed Mobility PT STG, Time to Achieve 3 days  -JW      Bed Mobility PT STG, Activity Type all bed mobility   -JW      Bed Mobility PT STG, [place] Level supervision required  -JW      Transfer Training PT STG    Transfer Training PT STG, Date Established 06/20/17  -JW      Transfer Training PT STG, Time to Achieve 3 days  -JW      Transfer Training PT STG, Activity Type all transfers  -JW      Transfer Training PT STG, [place] Level supervision required  -JW      Transfer Training PT STG, Assist Device walker, rolling  -JW      Gait Training PT STG    Gait Training Goal PT STG, Date Established 06/20/17  -JW      Gait Training Goal PT STG, Time to Achieve 3 days  -JW      Gait Training Goal PT STG, [place] Level supervision required  -JW      Gait Training Goal PT STG, Assist Device walker, rolling  -JW      Gait Training Goal PT STG, Distance to Achieve 100  -JW      Stair Training PT STG    Stair Training Goal PT STG, Date Established 06/20/17  -JW      Stair Training Goal PT STG, Time to Achieve 3 days  -JW      Stair Training Goal PT STG, Number of Steps 2  -JW      Stair Training Goal PT STG, [place] Level minimum assist (75% patient effort)  -JW      Stair Training Goal PT STG, Assist Device 2 handrails  -JW      Patient Education PT STG    Patient Education PT STG, Date Established 06/20/17  -JW      Patient Education PT STG, Time to Achieve 3 days  -JW      Patient Education PT STG, Education Type written program;HEP  -JW      Patient Education PT STG, Education Understanding demonstrate adequately;verbalize understanding  -JW        User Key  (r) = Recorded By, (t) = Taken By, (c) = Cosigned By    Initials Name Provider Type    KASIE Araiza, PT Physical Therapist          Physical Therapy Education     Title: PT OT SLP Therapies (Done)     Topic: Physical Therapy (Done)     Point: Mobility training (Done)    Learning Progress Summary    Learner Readiness Method Response Comment Documented by Status   Patient Acceptance E,TB,D,H ORIN gave written Handout to pt and spouse.  Reviewed part of  ex and will practice other ex futrue sessions.  06/21/17 1159 Done    Acceptance E VU   06/20/17 1444 Done   Significant Other Acceptance E,TB,D,H ORIN gave written Handout to pt and spouse.  Reviewed part of ex and will practice other ex futrue sessions.  06/21/17 1159 Done               Point: Home exercise program (Done)    Learning Progress Summary    Learner Readiness Method Response Comment Documented by Status   Patient Acceptance E,TB,D,H ORIN gave written Handout to pt and spouse.  Reviewed part of ex and will practice other ex futrue sessions.  06/21/17 1159 Done    Acceptance E VU   06/20/17 1444 Done   Significant Other Acceptance E,TB,D,H ORIN gave written Handout to pt and spouse.  Reviewed part of ex and will practice other ex futrue sessions.  06/21/17 1159 Done                      User Key     Initials Effective Dates Name Provider Type Discipline     08/11/15 -  Malka Greenberg, PTA Physical Therapy Assistant PT     12/01/15 -  Philomena Araiza PT Physical Therapist PT                    PT Recommendation and Plan  Anticipated Equipment Needs At Discharge: front wheeled walker, bedside commode  Anticipated Discharge Disposition: home with home health (pt and spouse report they prefer home health PT)  Planned Therapy Interventions: balance training, bed mobility training, gait training, home exercise program, strengthening, transfer training, stair training  PT Frequency: 2 times/day  Plan of Care Review  Plan Of Care Reviewed With: patient  Progress: improving  Outcome Summary/Follow up Plan: PT:  pt able to ambulate x 210 feet with rolling walker and assist of 2(has IV, candie drain, immobilizer  and is easily distracted)  Pt requires cues and at times assist for walker management and safety with mobility.  Pt instructed in LE HEP.  Pt able to perform SLR Independently this am so will try without knee immobilzer this pm.          Outcome Measures       06/21/17 0924 06/20/17 1312       How  much help from another person do you currently need...    Turning from your back to your side while in flat bed without using bedrails? 3  -KM 3  -JW     Moving from lying on back to sitting on the side of a flat bed without bedrails? 3  -KM 3  -JW     Moving to and from a bed to a chair (including a wheelchair)? 3  -KM 1  -JW     Standing up from a chair using your arms (e.g., wheelchair, bedside chair)? 3  -KM 1  -JW     Climbing 3-5 steps with a railing? 1  -KM 1  -JW     To walk in hospital room? 3  -KM 1  -JW     AM-PAC 6 Clicks Score 16  -KM 10  -JW     Functional Assessment    Outcome Measure Options AM-PAC 6 Clicks Basic Mobility (PT)  -KM AM-PAC 6 Clicks Basic Mobility (PT)  -JW       User Key  (r) = Recorded By, (t) = Taken By, (c) = Cosigned By    Initials Name Provider Type    HARLEEN Greenberg PTA Physical Therapy Assistant     Philomena Araiza, PT Physical Therapist           Time Calculation:         PT Charges       06/21/17 1203          Time Calculation    Start Time 0924  -KM      Stop Time 1005  -KM      Time Calculation (min) 41 min  -KM      PT - Next Appointment 06/21/17  -KM        User Key  (r) = Recorded By, (t) = Taken By, (c) = Cosigned By    Initials Name Provider Type    HARLEEN Greenberg PTA Physical Therapy Assistant          Therapy Charges for Today     Code Description Service Date Service Provider Modifiers Qty    01656204803 HC GAIT TRAINING EA 15 MIN 6/21/2017 Malka Greenberg PTA GP 1    25668667480 HC PT THER PROC EA 15 MIN 6/21/2017 Malka Greenberg PTA GP 2          PT G-Codes  Outcome Measure Options: AM-PAC 6 Clicks Basic Mobility (PT)    Malka Greenberg PTA  6/21/2017

## 2017-06-21 NOTE — PLAN OF CARE
Problem: Patient Care Overview (Adult)  Goal: Plan of Care Review  Outcome: Ongoing (interventions implemented as appropriate)    06/21/17 1200   Coping/Psychosocial Response Interventions   Plan Of Care Reviewed With patient   Patient Care Overview   Progress improving   Outcome Evaluation   Outcome Summary/Follow up Plan PT: pt able to ambulate x 210 feet with rolling walker and assist of 2(has IV, candie drain, immobilizer and is easily distracted) Pt requires cues and at times assist for walker management and safety with mobility. Pt instructed in LE HEP. Pt able to perform SLR Independently this am so will try without knee immobilzer this pm.

## 2017-06-21 NOTE — NURSING NOTE
Continued Stay Note  [facility]     Patient Name: Zach Gutierrez  MRN: [id number]  Today's Date: 6/21/2017    Admit Date: 6/20/2017          Discharge Plan       06/21/17 1408    Case Management/Social Work Plan    Additional Comments Spoke with Jeannette at [facility] - will fax info to office for rolling walker and MILY.  Will continue to follow.                Discharge Codes     None            Heike Plata RN

## 2017-06-21 NOTE — PROGRESS NOTES
Patient: Zach Gutierrez  Procedure(s):  TOTAL KNEE ARTHROPLASTY hima orthoalign antibiotic cement 7fr hemovac placement  Anesthesia type: [unfilled]    Patient location: [facility]  Vitals:    06/20/17 1947 06/20/17 2333 06/21/17 0401 06/21/17 0617   BP: 130/69 123/63 127/75 118/65   BP Location: Left arm Left arm Left arm Left arm   Patient Position: Lying Lying Lying Lying   Pulse: 61 68 72 67   Resp: 20 18 18 18   Temp: 96.3 °F (35.7 °C) 97.2 °F (36.2 °C) 97.3 °F (36.3 °C) 97.6 °F (36.4 °C)   TempSrc: Oral Oral Oral Oral   SpO2: 97% 98% 95% 97%   Weight:       Height:         Level of consciousness: awake, alert and oriented    Post-anesthesia pain: adequate analgesia  Airway patency: patent  Respiratory: unassisted  Cardiovascular: stable and blood pressure at baseline  Hydration: euvolemic    Anesthetic complications: no

## 2017-06-21 NOTE — PLAN OF CARE
Problem: Patient Care Overview (Adult)  Goal: Plan of Care Review    06/21/17 2052   Coping/Psychosocial Response Interventions   Plan Of Care Reviewed With patient   Patient Care Overview   Progress improving   Outcome Evaluation   Outcome Summary/Follow up Plan PT: Patient with improved safety with gait training however required verbal cues to navigate external obstacles and directional changes. Patient required SBA for transfers and CGA for gait x 180 feet with use of RW.

## 2017-06-22 VITALS
HEIGHT: 71 IN | RESPIRATION RATE: 20 BRPM | OXYGEN SATURATION: 97 % | BODY MASS INDEX: 28 KG/M2 | TEMPERATURE: 98.1 F | DIASTOLIC BLOOD PRESSURE: 77 MMHG | SYSTOLIC BLOOD PRESSURE: 141 MMHG | HEART RATE: 77 BPM | WEIGHT: 200 LBS

## 2017-06-22 LAB
BASOPHILS # BLD AUTO: 0.03 10*3/MM3 (ref 0–0.2)
BASOPHILS NFR BLD AUTO: 0.3 % (ref 0–2)
DEPRECATED RDW RBC AUTO: 40 FL (ref 37–54)
EOSINOPHIL # BLD AUTO: 0.1 10*3/MM3 (ref 0.1–0.3)
EOSINOPHIL NFR BLD AUTO: 1.1 % (ref 0–4)
ERYTHROCYTE [DISTWIDTH] IN BLOOD BY AUTOMATED COUNT: 12.5 % (ref 11.5–14.5)
GLUCOSE BLDC GLUCOMTR-MCNC: 176 MG/DL (ref 70–130)
HCT VFR BLD AUTO: 31.1 % (ref 42–52)
HGB BLD-MCNC: 11 G/DL (ref 14–18)
IMM GRANULOCYTES # BLD: 0.05 10*3/MM3 (ref 0–0.03)
IMM GRANULOCYTES NFR BLD: 0.6 % (ref 0–0.5)
LYMPHOCYTES # BLD AUTO: 0.98 10*3/MM3 (ref 0.6–4.8)
LYMPHOCYTES NFR BLD AUTO: 10.9 % (ref 20–45)
MCH RBC QN AUTO: 31.7 PG (ref 27–31)
MCHC RBC AUTO-ENTMCNC: 35.4 G/DL (ref 31–37)
MCV RBC AUTO: 89.6 FL (ref 80–94)
MONOCYTES # BLD AUTO: 0.83 10*3/MM3 (ref 0–1)
MONOCYTES NFR BLD AUTO: 9.3 % (ref 3–8)
NEUTROPHILS # BLD AUTO: 6.97 10*3/MM3 (ref 1.5–8.3)
NEUTROPHILS NFR BLD AUTO: 77.8 % (ref 45–70)
NRBC BLD MANUAL-RTO: 0 /100 WBC (ref 0–0)
PLATELET # BLD AUTO: 199 10*3/MM3 (ref 140–500)
PMV BLD AUTO: 9.2 FL (ref 7.4–10.4)
RBC # BLD AUTO: 3.47 10*6/MM3 (ref 4.7–6.1)
WBC NRBC COR # BLD: 8.96 10*3/MM3 (ref 4.8–10.8)

## 2017-06-22 PROCEDURE — 94799 UNLISTED PULMONARY SVC/PX: CPT

## 2017-06-22 PROCEDURE — 97116 GAIT TRAINING THERAPY: CPT

## 2017-06-22 PROCEDURE — 82962 GLUCOSE BLOOD TEST: CPT

## 2017-06-22 PROCEDURE — 97110 THERAPEUTIC EXERCISES: CPT

## 2017-06-22 PROCEDURE — 63710000001 INSULIN ASPART PER 5 UNITS: Performed by: INTERNAL MEDICINE

## 2017-06-22 PROCEDURE — 97535 SELF CARE MNGMENT TRAINING: CPT

## 2017-06-22 PROCEDURE — 85025 COMPLETE CBC W/AUTO DIFF WBC: CPT | Performed by: ORTHOPAEDIC SURGERY

## 2017-06-22 RX ORDER — PREGABALIN 75 MG/1
75 CAPSULE ORAL EVERY 12 HOURS SCHEDULED
Qty: 30 CAPSULE | Refills: 1 | Status: SHIPPED | OUTPATIENT
Start: 2017-06-22 | End: 2017-07-05

## 2017-06-22 RX ORDER — ASPIRIN 325 MG
325 TABLET ORAL EVERY 12 HOURS
Qty: 60 TABLET | Refills: 0 | Status: SHIPPED | OUTPATIENT
Start: 2017-06-22 | End: 2017-08-16

## 2017-06-22 RX ORDER — OXYCODONE AND ACETAMINOPHEN 7.5; 325 MG/1; MG/1
1 TABLET ORAL EVERY 6 HOURS PRN
Qty: 50 TABLET | Refills: 0 | Status: SHIPPED | OUTPATIENT
Start: 2017-06-22 | End: 2017-07-05 | Stop reason: SDUPTHER

## 2017-06-22 RX ORDER — PANTOPRAZOLE SODIUM 40 MG/1
40 TABLET, DELAYED RELEASE ORAL DAILY
Qty: 30 TABLET | Refills: 0 | Status: SHIPPED | OUTPATIENT
Start: 2017-06-22 | End: 2017-08-11 | Stop reason: SDUPTHER

## 2017-06-22 RX ADMIN — INSULIN ASPART 2 UNITS: 100 INJECTION, SOLUTION INTRAVENOUS; SUBCUTANEOUS at 08:43

## 2017-06-22 RX ADMIN — SERTRALINE HYDROCHLORIDE 25 MG: 50 TABLET ORAL at 08:42

## 2017-06-22 RX ADMIN — OXYCODONE HYDROCHLORIDE 10 MG: 5 TABLET ORAL at 08:42

## 2017-06-22 RX ADMIN — PREGABALIN 75 MG: 75 CAPSULE ORAL at 08:42

## 2017-06-22 RX ADMIN — METFORMIN HYDROCHLORIDE 500 MG: 500 TABLET ORAL at 08:42

## 2017-06-22 RX ADMIN — ACETAMINOPHEN 1000 MG: 500 TABLET, FILM COATED ORAL at 07:57

## 2017-06-22 RX ADMIN — PANTOPRAZOLE SODIUM 40 MG: 40 TABLET, DELAYED RELEASE ORAL at 07:57

## 2017-06-22 RX ADMIN — Medication 1 TABLET: at 08:42

## 2017-06-22 RX ADMIN — DOCUSATE SODIUM AND SENNOSIDES 2 TABLET: 8.6; 5 TABLET, FILM COATED ORAL at 08:42

## 2017-06-22 NOTE — THERAPY DISCHARGE NOTE
Acute Care - Physical Therapy Treatment Note/Discharge   Jovita Greene     Patient Name: Zach Gutierrez  : 1940  MRN: [id number]  Today's Date: 2017  Onset of Illness/Injury or Date of Surgery Date: 17  Date of Referral to PT: 17  Referring Physician: Price    Admit Date: 2017    Visit Dx:    ICD-10-CM ICD-9-CM   1. Primary osteoarthritis of right knee M17.11 715.16     Patient Active Problem List   Diagnosis   • Osteoarthritis of right knee       Physical Therapy Education     Title: PT OT SLP Therapies (Resolved)     Topic: Physical Therapy (Resolved)     Point: Mobility training (Resolved)    Learning Progress Summary    Learner Readiness Method Response Comment Documented by Status   Patient Acceptance E VU  BP 17 1434 Done    Acceptance E,TB,D,H VU gave written Handout to pt and spouse.  Reviewed part of ex and will practice other ex futrue sessions.  17 1159 Done    Acceptance E VU   17 1444 Done   Family Acceptance E [facility] 17 1434 Done   Significant Other Acceptance E,TB,D,H VU gave written Handout to pt and spouse.  Reviewed part of ex and will practice other ex futrue sessions.  17 1159 Done               Point: Home exercise program (Resolved)    Learning Progress Summary    Learner Readiness Method Response Comment Documented by Status   Patient Acceptance E VU   17 1434 Done    Acceptance E,TB,D,H VU gave written Handout to pt and spouse.  Reviewed part of ex and will practice other ex futrue sessions.  17 1159 Done    Acceptance E VU   17 1444 Done   Family Acceptance E VU   17 1434 Done   Significant Other Acceptance E,TB,D,H VU gave written Handout to pt and spouse.  Reviewed part of ex and will practice other ex futrue sessions.  17 1159 Done                      User Key     Initials Effective Dates Name Provider Type Discipline     08/11/15 -  Malka Greenberg PTA Physical Therapy Assistant PT     BP 12/01/15 -  Andres Torre, PT Physical Therapist PT    JW 12/01/15 -  Philomena Araiza, PT Physical Therapist PT                    IP PT Goals       06/22/17 1129 06/20/17 1444       Bed Mobility PT STG    Bed Mobility PT STG, Date Established  06/20/17  -JW     Bed Mobility PT STG, Time to Achieve  3 days  -JW     Bed Mobility PT STG, Activity Type  all bed mobility  -JW     Bed Mobility PT STG, [place] Level  supervision required  -JW     Bed Mobility PT STG, Date Goal Reviewed 06/22/17  -JW      Bed Mobility PT STG, Outcome goal not met   pt requires min assist for LE into bed  -JW      Bed Mobility PT STG, Reason Goal Not Met discharged from facility  -JW      Transfer Training PT STG    Transfer Training PT STG, Date Established  06/20/17  -JW     Transfer Training PT STG, Time to Achieve  3 days  -JW     Transfer Training PT STG, Activity Type  all transfers  -JW     Transfer Training PT STG, [place] Level  supervision required  -JW     Transfer Training PT STG, Assist Device  walker, rolling  -JW     Transfer Training PT STG, Date Goal Reviewed 06/22/17  -JW      Transfer Training PT STG, Outcome goal met  -JW      Gait Training PT STG    Gait Training Goal PT STG, Date Established  06/20/17  -JW     Gait Training Goal PT STG, Time to Achieve  3 days  -JW     Gait Training Goal PT STG, [place] Level  supervision required  -JW     Gait Training Goal PT STG, Assist Device  walker, rolling  -JW     Gait Training Goal PT STG, Distance to Achieve  100  -JW     Gait Training Goal PT STG, Date Goal Reviewed 06/22/17  -JW      Gait Training Goal PT STG, Outcome goal met  -JW      Stair Training PT STG    Stair Training Goal PT STG, Date Established  06/20/17  -JW     Stair Training Goal PT STG, Time to Achieve  3 days  -JW     Stair Training Goal PT STG, Number of Steps  2  -JW     Stair Training Goal PT STG, [place] Level  minimum assist (75% patient effort)  -JW     Stair Training Goal  PT STG, Assist Device  2 handrails  -     Stair Training Goal PT STG, Date Goal Reviewed 06/22/17  -      Stair Training Goal PT STG, Outcome goal met  -      Patient Education PT STG    Patient Education PT STG, Date Established  06/20/17  -     Patient Education PT STG, Time to Achieve  3 days  -     Patient Education PT STG, Education Type  written program;HEP  -     Patient Education PT STG, Education Understanding  demonstrate adequately;verbalize understanding  -     Patient Education PT STG, Date Goal Reviewed 06/22/17  -      Patient Education PT STG Outcome goal met  -        User Key  (r) = Recorded By, (t) = Taken By, (c) = Cosigned By    Initials Name Provider Type    JW Philomena Araiza, PT Physical Therapist              Adult Rehabilitation Note       06/22/17 0855 06/21/17 1320 06/21/17 0924    Rehab Assessment/Intervention    Discipline physical therapist  -JW physical therapist  -BP physical therapy assistant  -KM    Document Type therapy note (daily note);discharge summary  -JW therapy note (daily note)  -BP therapy note (daily note)  -KM    Subjective Information agree to therapy;complains of;pain  -JW agree to therapy;no complaints  -BP agree to therapy;no complaints  -KM    Patient Effort, Rehab Treatment good  -JW good  -BP good  -KM    Precautions/Limitations fall precautions  -JW fall precautions  -BP brace on when up;fall precautions  -KM    Specific Treatment Considerations   pt still has IV and Ethan drain in.    -KM    Recorded by [JW] Philomena Araiza, PT [BP] Andres Torre, PT [KM] Malka Greenberg, PTA    Pain Assessment    Pain Assessment 0-10  -JW 0-10  -BP No/denies pain  -KM    Pain Score 2  - 4  -BP     Post Pain Score 3  -JW      Pain Type Acute pain;Surgical pain  -JW Acute pain;Surgical pain  -BP     Pain Location Knee  -JW Knee  -BP     Pain Orientation Right  -JW Right  -BP     Pain Intervention(s) Repositioned;Cold applied  -JW Repositioned;Cold applied  -BP      Response to Interventions tolerated  -JW tolerated  -BP     Recorded by [JW] Philomena Araiza, PT [BP] Andres Torre, PT [KM] Malka Greenberg PTA    Cognitive Assessment/Intervention    Personal Safety Interventions  gait belt;nonskid shoes/slippers when out of bed  -BP gait belt;nonskid shoes/slippers when out of bed  -KM    Recorded by  [BP] Andres Torre, PT [KM] Malka Greenberg PTA    Bed Mobility, Assessment/Treatment    Bed Mob, Sit to Supine, [place] minimum assist (75% patient effort);verbal cues required   assist for LE into bed  -      Bed Mobility, Comment  patient up in chair  -BP pt up in chair  -KM    Recorded by [JW] Philomena Araiza, PT [BP] Andres Torre, PT [KM] Malka Greenberg PTA    Transfer Assessment/Treatment    Transfers, Sit-Stand [place] stand by assist  - stand by assist  - contact guard assist;minimum assist (75% patient effort);1 person + 1 person to manage equipment;verbal cues required  -KM    Transfers, Stand-Sit [place] stand by assist  - contact guard assist  - contact guard assist;minimum assist (75% patient effort);1 person + 1 person to manage equipment;verbal cues required  -KM    Transfers, Sit-Stand-Sit, Assist Device rolling walker  - rolling walker  - rolling walker  -    Transfer, Comment  Verbal cues required for hand placement and AD placement  - pt requested to stand to try to go to the bathroom- opt able to stand and hold onto walker but required assist with urinall(pt unable to void notified nurse)  -KM    Recorded by [JW] Philomena Araiza, PT [BP] Andres Torre, PT [KM] Malka Greenberg PTA    Gait Assessment/Treatment    Gait, [place] Level stand by [facility]  - contact guard assist  - minimum assist (75% patient effort);1 person + 1 person to manage equipment;verbal cues required;nonverbal cues required (demo/gesture)  -KM    Gait, Assistive Device rolling walker  - rolling walker  - rolling walker  -KM    Gait,  Distance (Feet) 150  -  -  -KM    Gait, Gait Pattern Analysis swing-through gait  -JW swing-through gait  -BP     Gait, Gait Deviations forward flexed posture;narrow base;step length decreased  - forward flexed posture;alayna decreased;right:;antalgic;decreased heel strike;left:;step length decreased  -BP     Gait, Safety Issues balance decreased during turns  -  --   cues to stay with walker- at times too close or too far  -    Gait, Impairments  pain  -     Gait, Comment pt requires verbal cues for proper distance from walker and equal step length.  - Patient initially demonstrates step to gait pattern with slow gait speed. With verbal cues patient able to achieve step through gait with improved posture and gait speed. Patient requires verbal cues to navigate external obstacles and directional changes.   -BP at first with ambulation, pt had delay with placing foot on ground with each step.  Then pt ambulating at fast pace, required cues and assist to slow down- more assist with turns.  Pt easily distracted and required cues for safety.  knee immobilizer on this am but pt doing well enough with SLR will try without knee immobilizer this pm.  -KM    Recorded by [JW] Philomena Araiza, PT [BP] Andres Torre, PT [KM] Malka Greenberg PTA    Stairs Assessment/Treatment    Number of Stairs 5  -      Stairs, Handrail Location both sides  -      Stairs, [place] Level contact guard assist  -      Stairs, Technique Used step to step (ascending);step to step (descending)  -      Recorded by [JW] Philomena Araiza, PT      Lower Body Dressing Assessment/Training    LB Dressing Assess/Train, Comment   pt requesting underwear and shorts on- required assist due to candie drain, etc.  OT present for part of session also  -    Recorded by   [KM] Malka Greenberg, PTA    Therapy Exercises    Right Lower Extremity --   pt given written HEP, verbalizes no concerns  - 15 reps;AROM:;heel slides;heel  raises;hip abduction/adduction;hip flexion;LAQ;quad sets;SLR  -BP 10 reps;ankle pumps/circles;heel slides;hip abduction/adduction;quad sets;SAQ;SLR   gave written HEP to pt/ spouse  -KM    Recorded by [JW] Philomena Araiza, PT [BP] Andres Torre, PT [KM] Malka Greenberg PTA    Positioning and Restraints    Pre-Treatment Position other (comment)   pt sitting EOB  -JW sitting in chair/recliner  -BP sitting in chair/recliner  -KM    Post Treatment Position bed  -JW chair  -BP chair  -KM    In Bed supine;call light within reach;encouraged to call for assist;notified nsg;with family/caregiver  -JW      In Chair  reclined;call light within reach;encouraged to call for assist;with family/caregiver  -BP notified nsg;reclined;encouraged to call for assist;with family/caregiver;legs elevated  -KM    Recorded by [JW] Philomena Araiza, PT [BP] Andres Torre, PT [KM] Malka Greenberg PTA      User Key  (r) = Recorded By, (t) = Taken By, (c) = Cosigned By    Initials Name Effective Dates     Malka Greenberg PTA 08/11/15 -     BP Andres Torre, PT 12/01/15 -     KASIE Araiza, PT 12/01/15 -           PT Recommendation and Plan  Anticipated Equipment Needs At Discharge: front wheeled walker, bedside commode  Anticipated Discharge Disposition: home with home health  Planned Therapy Interventions: balance training, bed mobility training, gait training, home exercise program, strengthening, transfer training, stair training  PT Frequency: 2 times/day  Plan of Care Review  Plan Of Care Reviewed With: patient  Outcome Summary/Follow up Plan: Physical therapy evaluation complete.  Patient requires min assist for supine to sit transfer and sit to stand transfers with mod assist x2.  Patient with episodes of knee buckling upon standing and unable to take steps at this time.  Patient will benefit from further physical therapy to address deficits in functional mobility, strength and ROM.  Patient will be seen twice daily for therapy.   Patient reports plan is to discharge home with home health PT.          Outcome Measures       06/22/17 0855 06/21/17 1320 06/21/17 0930    How much help from another person do you currently need...    Turning from your back to your side while in flat bed without using bedrails? 3  -JW 3  -BP     Moving from lying on back to sitting on the side of a flat bed without bedrails? 3  -JW 3  -BP     Moving to and from a bed to a chair (including a wheelchair)? 3  -JW 3  -BP     Standing up from a chair using your arms (e.g., wheelchair, bedside chair)? 3  -JW 3  -BP     Climbing 3-5 steps with a railing? 3  -JW 3  -BP     To walk in hospital room? 3  -JW 3  -BP     AM-PAC 6 Clicks Score 18  -JW 18  -BP     How much help from another is currently needed...    Putting on and taking off regular lower body clothing?   2  -JJ    Bathing (including washing, rinsing, and drying)   2  -JJ    Toileting (which includes using toilet bed pan or urinal)   3  -JJ    Putting on and taking off regular upper body clothing   4  -JJ    Taking care of personal grooming (such as brushing teeth)   4  -JJ    Eating meals   4  -JJ    Score   19  -JJ    Functional Assessment    Outcome Measure Options AM-PAC 6 Clicks Basic Mobility (PT)  -JW AM-PAC 6 Clicks Basic Mobility (PT)  -BP AM-PAC 6 Clicks Daily Activity (OT)  -JJ      06/21/17 0924 06/20/17 1312       How much help from another person do you currently need...    Turning from your back to your side while in flat bed without using bedrails? 3  -KM 3  -JW     Moving from lying on back to sitting on the side of a flat bed without bedrails? 3  -KM 3  -JW     Moving to and from a bed to a chair (including a wheelchair)? 3  -KM 1  -JW     Standing up from a chair using your arms (e.g., wheelchair, bedside chair)? 3  -KM 1  -JW     Climbing 3-5 steps with a railing? 1  -KM 1  -JW     To walk in hospital room? 3  -KM 1  -JW     AM-PAC 6 Clicks Score 16  -KM 10  -JW     Functional Assessment     Outcome Measure Options AM-PAC 6 Clicks Basic Mobility (PT)  - AM-PAC 6 Clicks Basic Mobility (PT)  -       User Key  (r) = Recorded By, (t) = Taken By, (c) = Cosigned By    Initials Name Provider Type    HARLEEN Greenberg, PTA Physical Therapy Assistant    UJLIANA Sykes, OTR Occupational Therapist    BP Andres Torre, PT Physical Therapist    KASIE Araiza, PT Physical Therapist           Time Calculation:         PT Charges       06/22/17 1130          Time Calculation    Start Time 0855  -      Stop Time 0933  -      Time Calculation (min) 38 min  -      PT Received On 06/22/17  -        User Key  (r) = Recorded By, (t) = Taken By, (c) = Cosigned By    Initials Name Provider Type    KASIE Araiza, PT Physical Therapist          Therapy Charges for Today     Code Description Service Date Service Provider Modifiers Qty    48746900802 HC PT THER PROC EA 15 MIN 6/22/2017 Philomena Araiza, PT GP 2    [id number] HC GAIT TRAINING EA 15 MIN 6/22/2017 Philomena Araiza, PT GP 1          PT G-Codes  Outcome Measure Options: AM-PAC 6 Clicks Basic Mobility (PT)    PT Discharge Summary  Anticipated Discharge Disposition: home with home health (pt and spouse report they prefer home health PT)  Reason for Discharge: Discharge from facility  Outcomes Achieved: Patient able to partially acheive established goals  Discharge Destination: Home with home health    Philomena Araiza PT  6/22/2017

## 2017-06-22 NOTE — PLAN OF CARE
Problem: Patient Care Overview (Adult)  Goal: Plan of Care Review  Outcome: Outcome(s) achieved Date Met:  06/22/17 06/22/17 0757   Coping/Psychosocial Response Interventions   Plan Of Care Reviewed With patient   Patient Care Overview   Progress improving       Goal: Adult Individualization and Mutuality  Outcome: Outcome(s) achieved Date Met:  06/22/17  Goal: Discharge Needs Assessment  Outcome: Outcome(s) achieved Date Met:  06/22/17    Problem: Perioperative Period (Adult)  Goal: Signs and Symptoms of Listed Potential Problems Will be Absent or Manageable (Perioperative Period)  Outcome: Outcome(s) achieved Date Met:  06/22/17    Problem: Knee Replacement, Total (Adult)  Goal: Signs and Symptoms of Listed Potential Problems Will be Absent or Manageable (Knee Replacement, Total)  Outcome: Outcome(s) achieved Date Met:  06/22/17

## 2017-06-22 NOTE — PROGRESS NOTES
Orthopedic Progress Note   Chief Complaint:  Status post right total knee    Subjective     Interval History: Patient is postop day 2 doing extremely well.  He is afebrile hemodynamically stable with hemoglobin 11.0.  Pain well-controlled oral medication.  He is independent with gait to be discharged home later this morning after PT worked with him on stair climbing          Objective     Vital Signs  Temp:  [98 °F (36.7 °C)-98.7 °F (37.1 °C)] 98.1 °F (36.7 °C)  Heart Rate:  [71-84] 79  Resp:  [18-20] 20  BP: (112-141)/(62-77) 141/77  Body mass index is 27.89 kg/(m^2).    Intake/Output Summary (Last 24 hours) at 06/22/17 0724  Last data filed at 06/21/17 2337   Gross per 24 hour   Intake             1280 ml   Output              100 ml   Net             1180 ml             Physical Exam:   General: patient awake, alert and cooperative   Cardiovascular: regular rhythm and rate   Pulm: clear to auscultation bilaterally   Abdomen: Benign.  Soft bowel sounds   Extremities: Dressing was changed to the right knee.  The wound is completely benign no erythema and no drainage no swelling.  Good distal pulses no motor deficit no sensory loss.  DC home after morning PT.   Neurologic: Normal mood and behavior     Results Review:     I reviewed the patient's new clinical results.      WBC No results found for: WBCS   HGB Hemoglobin   Date Value Ref Range Status   06/22/2017 11.0 (L) 14.0 - 18.0 g/dL Final   06/21/2017 12.1 (L) 14.0 - 18.0 g/dL Final      HCT Hematocrit   Date Value Ref Range Status   06/22/2017 31.1 (L) 42.0 - 52.0 % Final   06/21/2017 34.9 (L) 42.0 - 52.0 % Final      Platlets No results found for: LABPLAT     PT/INR:  No results found for: PROTIME/No results found for: INR    Sodium No results found for: NA   Potassium No results found for: K   Chloride No results found for: CL   Bicarbonate No results found for: PLASMABICARB   BUN No results found for: BUN   Creatinine No results found for: CREATININE    Calcium No results found for: CALCIUM   Magnesium  AST  ALT  Bilirubin, Total  AlkPhos  Albumin    Amylase  Lipase    Radiology: No results found for: MG  No components found for: AST.*  No components found for: ALT.*  No components found for: BILIRUBIN, TOTAL.*    No components found for: ALKPHOS.*  No components found for: ALBUMIN.*      No components found for: AMYLASE.*  No components found for: LIPASE.*            Imaging Results (most recent)     Procedure Component Value Units Date/Time    XR Knee 1 or 2 View Right [[id number]] Collected:  06/20/17 0951     Updated:  06/20/17 0953    Narrative:       POSTOP RIGHT KNEE SERIES, 06/20/2017::     HISTORY:  Postop knee arthroplasty surgery.     TECHNIQUE:  AP and crosstable lateral radiographs of the right knee were obtained  portably following surgery.     FINDINGS:  Total knee arthroplasty components are well-positioned postoperatively.  No visible fracture or dislocation.       Impression:       Satisfactory postoperative appearance following right total knee  arthroplasty.     This report was finalized on 6/20/2017 9:51 AM by Dr. Leonid Munoz MD.                  lactated ringers 30 mL/hr Last Rate: 30 mL/hr (06/20/17 1708)         Assessment/Plan     Patient Active Problem List   Diagnosis Code   • Osteoarthritis of right knee M17.9         DC home after morning PT session    Bj Thrasher MD  06/22/17  7:24 AM

## 2017-06-22 NOTE — PLAN OF CARE
Problem: Inpatient Occupational Therapy  Goal: Patient Education Goal STG- OT    06/21/17 1504 06/22/17 1253   Patient Education OT STG   Patient Education OT STG, Education Type adaptive equipment mgmt --    Patient Education OT STG, Date Goal Reviewed --  06/22/17   Patient Education OT STG Outcome --  goal partially met  (education provided for patient.)   Patient Education OT STG, Reason Goal Not Met --  discharged from facility

## 2017-06-22 NOTE — NURSING NOTE
Continued Stay Note  SHAQUILLE Nation     Patient Name: Zach Gutierrez  MRN: [id number]  Today's Date: 6/22/2017    Admit Date: 6/20/2017          Discharge Plan       06/22/17 1048    Case Management/Social Work Plan    Plan Home with [facility].    Additional Comments [employer] delivered rolling walker to room and BSC to home.      Final Note    Final Note Home with home health              Discharge Codes     None        Expected Discharge Date and Time     Expected Discharge Date Expected Discharge Time    Jun 22, 2017             Heike Plata RN

## 2017-06-22 NOTE — THERAPY DISCHARGE NOTE
Acute Care - Occupational Therapy Treatment Note/Discharge  SHAQUILLE Radnor     Patient Name: Zach Gutierrez  : 1940  MRN: [id number]  Today's Date: 2017  Onset of Illness/Injury or Date of Surgery Date: 17  Date of Referral to OT: 17  Referring Physician: Price      Admit Date: 2017    Visit Dx:     ICD-10-CM ICD-9-CM   1. Primary osteoarthritis of right knee M17.11 715.16     Patient Active Problem List   Diagnosis   • Osteoarthritis of right knee             Adult Rehabilitation Note       17 0900         Discipline occupational therapist  -SD    Document Type therapy note (daily note);discharge summary  -SD    Subjective Information agree to therapy;complains of;pain  -SD    Patient Effort, Rehab Treatment good  -SD    Precautions/Limitations fall precautions  -SD    Specific Treatment Considerations education with use of long handled adaptive equipment to manage lower body adl's  -SD    Equipment Issued to Patient bathing equipment;dressing equipment  -SD    Recorded by [SD] Artur Malhotra, NEVAR       Pain Assessment No/denies pain  -SD    Pain Score 2  -SD    Post Pain Score 3  -SD    Pain Type Acute pain;Surgical pain  -SD    Pain Location Knee  -SD    Pain Orientation Right  -SD    Pain Intervention(s)     Response to Interventions     Recorded by [SD] Artur Malhotra OTR    LB Bathing Assess/Train Assistive Device long-handled sponge  -SD    LB Bathing Assess/Train, Position sitting  -SD    LB Bathing Assess/Train, [place] Level verbal cues required  -SD    LB Bathing Assess/Train, Comment pt simulated use of lh sponge for lower body bathing  -SD    Recorded by [SD] Artur Malhotra OTR       LB Dressing Assess/Train, Clothing Type doffing:;donning:;slipper socks;shoes  -SD    LB Dressing Assess/Train, Assist Device reacher;long-handled shoe horn;sock-aid  -SD    LB Dressing Assess/Train, Position sitting  -SD    LB Dressing Assess/Train, [place] verbal cues  required  -SD    LB Dressing Assess/Train, Comment pt demonstrated use of sock aid and long handled shoe horn to don/doff sock and shoe.  Pt demonstrated use of reacher.  education with use of reacher for donning/doffing clothing due to limited knee rom and pain following surgery.  -SD    Recorded by [SD] ZAIRA Cotto          Pre-Treatment Position other (comment)   in rehab area with physical therapy  -SD    Post Treatment Position --   in rehab area with physical therapy  -SD    In Bed     In Chair     Recorded by [SD] Artur Malhotra OTR      User Key  (r) = Recorded By, (t) = Taken By, (c) = Cosigned By    Initials Name Effective Dates    KM Malka Greenberg, PTA 08/11/15 -     HIRA Malhotra, OTR 06/22/16 -     BP Andres Torre, PT 12/01/15 -     JW Philomena Araiza, PT 12/01/15 -                 OT Goals       06/22/17 1253 06/21/17 1504       Patient Education OT STG    Patient Education OT STG, Date Established  06/21/17  -     Patient Education OT STG, Time to Achieve  2 days  -     Patient Education OT STG, Education Type  adaptive equipment mgmt  -     Patient Education OT STG, Education Understanding  independent  -     Patient Education OT STG, Additional Goal  pt/ caregiver I with ae managment for lb adls  -     Patient Education OT STG, Date Goal Reviewed 06/22/17  -SD      Patient Education OT STG Outcome goal partially met   education provided for patient.  -SD      Patient Education OT STG, Reason Goal Not Met discharged from facility  -SD        User Key  (r) = Recorded By, (t) = Taken By, (c) = Cosigned By    Initials Name Provider Type    SD Artur Malhotra, OTR Occupational Therapist    JKAM Sykes, OTR Occupational Therapist          Occupational Therapy Education     Title: PT OT SLP Therapies (Resolved)     Topic: Occupational Therapy (Resolved)     Point: ADL training (Resolved)    Description: Instruct learner(s) on proper safety adaptation and  remediation techniques during self care or transfers.   Instruct in proper use of assistive devices.    Learning Progress Summary    Learner Readiness Method Response Comment Documented by Status   Patient Acceptance E VU pt and spouse educated on adls, long handeld ae and safety with functional transfers  06/21/17 1503 Done   Significant Other Acceptance E VU pt and spouse educated on adls, long handeld ae and safety with functional transfers  06/21/17 1503 Done               Point: Precautions (Resolved)    Description: Instruct learner(s) on prescribed precautions during self-care and functional transfers.    Learning Progress Summary    Learner Readiness Method Response Comment Documented by Status   Patient Acceptance E VU pt and spouse educated on adls, long handeld ae and safety with functional transfers  06/21/17 1503 Done   Significant Other Acceptance E VU pt and spouse educated on adls, long handeld ae and safety with functional transfers  06/21/17 1503 Done                      User Key     Initials Effective Dates Name Provider Type Discipline     06/22/16 -  Whitney Sykes, OTR Occupational Therapist OT                OT Recommendation and Plan  Anticipated Discharge Disposition: home with assist  Planned Therapy Interventions: adaptive equipment training, ADL retraining             Outcome Measures       06/22/17 0855 06/21/17 1320 06/21/17 0930    How much help from another person do you currently need...    Turning from your back to your side while in flat bed without using bedrails? 3  -JW 3  -BP     Moving from lying on back to sitting on the side of a flat bed without bedrails? 3  -JW 3  -BP     Moving to and from a bed to a chair (including a wheelchair)? 3  -JW 3  -BP     Standing up from a chair using your arms (e.g., wheelchair, bedside chair)? 3  -JW 3  -BP     Climbing 3-5 steps with a railing? 3  -JW 3  -BP     To walk in hospital room? 3  -JW 3  -BP     AM-PAC 6 Clicks  Score 18  -JW 18  -BP     How much help from another is currently needed...    Putting on and taking off regular lower body clothing?   2  -JJ    Bathing (including washing, rinsing, and drying)   2  -JJ    Toileting (which includes using toilet bed pan or urinal)   3  -JJ    Putting on and taking off regular upper body clothing   4  -JJ    Taking care of personal grooming (such as brushing teeth)   4  -JJ    Eating meals   4  -JJ    Score   19  -JJ    Functional Assessment    Outcome Measure Options AM-PAC 6 Clicks Basic Mobility (PT)  -JW AM-PAC 6 Clicks Basic Mobility (PT)  -BP AM-PAC 6 Clicks Daily Activity (OT)  -JJ      06/21/17 0924 06/20/17 1312       How much help from another person do you currently need...    Turning from your back to your side while in flat bed without using bedrails? 3  -KM 3  -JW     Moving from lying on back to sitting on the side of a flat bed without bedrails? 3  -KM 3  -JW     Moving to and from a bed to a chair (including a wheelchair)? 3  -KM 1  -JW     Standing up from a chair using your arms (e.g., wheelchair, bedside chair)? 3  -KM 1  -JW     Climbing 3-5 steps with a railing? 1  -KM 1  -JW     To walk in hospital room? 3  -KM 1  -JW     AM-PAC 6 Clicks Score 16  -KM 10  -JW     Functional Assessment    Outcome Measure Options AM-PAC 6 Clicks Basic Mobility (PT)  -KM AM-PAC 6 Clicks Basic Mobility (PT)  -JW       User Key  (r) = Recorded By, (t) = Taken By, (c) = Cosigned By    Initials Name Provider Type    KM Malka Greenberg, PTA Physical Therapy Assistant    JULIANA Sykes, OTR Occupational Therapist    BP Andres Torre, PT Physical Therapist    JW Philomena Araiza, PT Physical Therapist           Time Calculation:          Time Calculation- OT       06/22/17 1254          Time Calculation- OT    OT Start Time 0900  -SD        User Key  (r) = Recorded By, (t) = Taken By, (c) = Cosigned By    Initials Name Provider Type    HIRA Malhotra, OTR Occupational  Therapist          Therapy Charges for Today     Code Description Service Date Service Provider Modifiers Qty    [id number]  OT SELF CARE/MGMT/TRAIN EA 15 MIN 6/22/2017 Artur Malhotra OTR GO 1               OT Discharge Summary  Anticipated Discharge Disposition: home with assist  Reason for Discharge: Discharge from facility  Outcomes Achieved: Patient able to partially acheive established goals  Discharge Destination: Home with assist    ZAIRA Wade  6/22/2017

## 2017-06-22 NOTE — PLAN OF CARE
Problem: Inpatient Physical Therapy  Goal: Bed Mobility Goal STG- PT  Outcome: Unable to achieve outcome(s) by discharge Date Met:  06/22/17 06/20/17 1444 06/22/17 1129   Bed Mobility PT STG   Bed Mobility PT STG, Date Established 06/20/17 --    Bed Mobility PT STG, Time to Achieve 3 days --    Bed Mobility PT STG, Activity Type all bed mobility --    Bed Mobility PT STG, [place] Level supervision required --    Bed Mobility PT STG, Date Goal Reviewed --  06/22/17   Bed Mobility PT STG, Outcome --  goal not met  (pt requires min assist for LE into bed)   Bed Mobility PT STG, Reason Goal Not Met --  discharged from facility       Goal: Transfer Training Goal 1 STG- PT  Outcome: Outcome(s) achieved Date Met:  06/22/17 06/20/17 1444 06/22/17 1129   Transfer Training PT STG   Transfer Training PT STG, Date Established 06/20/17 --    Transfer Training PT STG, Time to Achieve 3 days --    Transfer Training PT STG, Activity Type all transfers --    Transfer Training PT STG, [place] Level supervision required --    Transfer Training PT STG, Assist Device walker, rolling --    Transfer Training PT STG, Date Goal Reviewed --  06/22/17   Transfer Training PT STG, Outcome --  goal met       Goal: Gait Training Goal STG- PT  Outcome: Outcome(s) achieved Date Met:  06/22/17 06/20/17 1444 06/22/17 1129   Gait Training PT STG   Gait Training Goal PT STG, Date Established 06/20/17 --    Gait Training Goal PT STG, Time to Achieve 3 days --    Gait Training Goal PT STG, [place] Level supervision required --    Gait Training Goal PT STG, Assist Device walker, rolling --    Gait Training Goal PT STG, Distance to Achieve 100 --    Gait Training Goal PT STG, Date Goal Reviewed --  06/22/17   Gait Training Goal PT STG, Outcome --  goal met       Goal: Stair Training Goal STG- PT  Outcome: Outcome(s) achieved Date Met:  06/22/17 06/20/17 1444 06/22/17 1129   Stair Training PT STG   Stair Training Goal PT STG, Date  Established 06/20/17 --    Stair Training Goal PT STG, Time to Achieve 3 days --    Stair Training Goal PT STG, Number of Steps 2 --    Stair Training Goal PT STG, [place] Level minimum assist (75% patient effort) --    Stair Training Goal PT STG, Assist Device 2 handrails --    Stair Training Goal PT STG, Date Goal Reviewed --  06/22/17   Stair Training Goal PT STG, Outcome --  goal met       Goal: Patient Education Goal STG- PT  Outcome: Outcome(s) achieved Date Met:  06/22/17 06/20/17 1444 06/22/17 1129   Patient Education PT STG   Patient Education PT STG, Date Established 06/20/17 --    Patient Education PT STG, Time to Achieve 3 days --    Patient Education PT STG, Education Type written program;HEP --    Patient Education PT STG, Education Understanding demonstrate adequately;verbalize understanding --    Patient Education PT STG, Date Goal Reviewed --  06/22/17   Patient Education PT STG Outcome --  goal met

## 2017-06-22 NOTE — DISCHARGE SUMMARY
Discharge Summary    Patient name: Zach Gutierrez    Medical record number: [id number]    Admission date: 6/20/2017  Discharge date: 6/22/2017      Attending physician: Price    Primary care physician: Jessica Mac MD    Referring physician:     Consulting physician(s): Hospitalist    Condition on discharge: Stable  Primary Diagnoses: Osteoarthritis right knee    Secondary Diagnoses:     Operative Procedure: Right total knee arthroplasty    Hospital Course: The patient is a very pleasant 76 y.o. male that was admitted to the hospital with end-stage degenerative arthritis right failed to respond to nonoperative management.  He is admitted to the hospital after prep evaluation for right total knee arthroplasty.  Please see operative note for details.  He did well pain was well-controlled all medication is independent with gait and time of discharge.    Discharge medications:    Zach Gutierrez   Home Medication Instructions KRISTINE:[id number]    Printed on:06/22/17 0731   Medication Information                      aspirin 325 MG tablet  Take 1 tablet by mouth Every 12 (Twelve) Hours.             donepezil (ARICEPT) 10 MG tablet  Take 10 mg by mouth Every Night.             finasteride (PROSCAR) 5 MG tablet  Take 5 mg by mouth Every Night.             memantine (NAMENDA XR) 28 MG capsule sustained-release 24 hr extended release capsule  Take 28 mg by mouth Every Night.             metFORMIN (GLUCOPHAGE) 500 MG tablet  Take 500 mg by mouth 2 (Two) Times a Day With Meals.             Multiple Vitamin (MULTIVITAMIN) tablet  Take 1 tablet by mouth Daily.             pantoprazole (PROTONIX) 40 MG EC tablet  Take 1 tablet by mouth Daily.             pregabalin (LYRICA) 75 MG capsule  Take 1 capsule by mouth Every 12 (Twelve) Hours.             sertraline (ZOLOFT) 25 MG tablet  Take 25 mg by mouth Daily.             tamsulosin (FLOMAX) 0.4 MG capsule 24 hr capsule  Take 1 capsule by mouth Every Night.                  Discharge instructions:  Patient is take 1 adult twice a day for the next 2 weeks for DVT prophylaxis.  Pain medication as needed.  Also prescribed   Lyrica patient has follow-up visit scheduled in 10 days taken twice a day.  Patient may shower.      Follow-up appointment:  Patient has follow-up visit scheduled in 10 days

## 2017-06-22 NOTE — DISCHARGE INSTRUCTIONS
Patient is take 1 adult aspirin twice a day for the next 2 weeks for DVT prophylaxis.   Pain medication as needed. Also prescribed Lyrica taken twice a day.   Patient may shower.

## 2017-07-05 ENCOUNTER — OFFICE VISIT (OUTPATIENT)
Dept: ORTHOPEDIC SURGERY | Facility: CLINIC | Age: 77
End: 2017-07-05

## 2017-07-05 DIAGNOSIS — Z96.651 STATUS POST TOTAL RIGHT KNEE REPLACEMENT: Primary | ICD-10-CM

## 2017-07-05 PROCEDURE — 99024 POSTOP FOLLOW-UP VISIT: CPT | Performed by: ORTHOPAEDIC SURGERY

## 2017-07-05 RX ORDER — OXYCODONE AND ACETAMINOPHEN 7.5; 325 MG/1; MG/1
1 TABLET ORAL EVERY 6 HOURS PRN
Qty: 50 TABLET | Refills: 0 | Status: SHIPPED | OUTPATIENT
Start: 2017-07-05 | End: 2017-08-16

## 2017-07-05 NOTE — PROGRESS NOTES
Subjective:status post right total knee arthroplasty     Patient ID: Zach Gutierrez is a 76 y.o. male.    Chief Complaint:    History of Present Illness patient is 2 weeks out is doing well ambulating independently with a cane.  His preoperative pain has resolved but he does have postoperative discomfort which is expected.       Social History     Occupational History   • Not on file.     Social History Main Topics   • Smoking status: Former Smoker   • Smokeless tobacco: Never Used   • Alcohol use Yes      Comment: one jim/week   • Drug use: No   • Sexual activity: Defer      Review of Systems   Constitutional: Negative for chills, diaphoresis, fever and unexpected weight change.   HENT: Negative for hearing loss, nosebleeds, sore throat and tinnitus.    Eyes: Negative for pain and visual disturbance.   Respiratory: Negative for cough, shortness of breath and wheezing.    Cardiovascular: Negative for chest pain and palpitations.   Gastrointestinal: Negative for abdominal pain, diarrhea, nausea and vomiting.   Endocrine: Negative for cold intolerance, heat intolerance and polydipsia.   Genitourinary: Positive for difficulty urinating. Negative for dysuria and hematuria.   Musculoskeletal: Positive for myalgias. Negative for arthralgias and joint swelling.   Skin: Negative for rash and wound.   Allergic/Immunologic: Negative for environmental allergies.   Neurological: Negative for dizziness, syncope and numbness.   Hematological: Does not bruise/bleed easily.   Psychiatric/Behavioral: Negative for dysphoric mood and sleep disturbance. The patient is not nervous/anxious.    All other systems reviewed and are negative.        Past Medical History:   Diagnosis Date   • Arthritis    • Dementia    • Diabetes    • Knee sprain    • ZENA (obstructive sleep apnea)     cpap     Past Surgical History:   Procedure Laterality Date   • COLONOSCOPY N/A 5/19/2017    Procedure: COLONOSCOPY;  Surgeon: Srikanth Payan  MD;  Location: [facility] OR;  Service:    • HERNIA REPAIR Bilateral     inguinal   • OK TOTAL KNEE ARTHROPLASTY Right 6/20/2017    Procedure: TOTAL KNEE ARTHROPLASTY hima orthoalign antibiotic cement 7fr hemovac placement;  Surgeon: Bj Thrasher MD;  Location: [facility] OR;  Service: Orthopedics   • SHOULDER SURGERY Right 2007    RCR     No family history on file.      Objective:  There were no vitals filed for this visit.  There were no vitals filed for this visit.  There is no height or weight on file to calculate BMI.       Ortho Exam  he is alert and oriented ×3.  His wound is completely benign.  The Dermabond dressing is removed there is no erythema or drainage. His calf is nontender although it is swollen.  He has full extension flexion about 115.  Good distal pulses no motor deficit no sensory loss.    Assessment:       1. Status post total right knee replacement          Plan:      e is to DC the aspirin to just once a day.  Begin outpatient physical therapy.  Return in 2 weeks with an x-ray of the knee.      Work Status:    [employer] query complete.    Orders:  No orders of the defined types were placed in this encounter.      Medications:  New Medications Ordered This Visit   Medications   • oxyCODONE-acetaminophen (PERCOCET) 7.5-325 MG per tablet     Sig: Take 1 tablet by mouth Every 6 (Six) Hours As Needed for Moderate Pain (4-6) or Severe Pain (7-10).     Dispense:  50 tablet     Refill:  0       Followup:  Return in about 2 weeks (around 7/19/2017).          Dragon transcription disclaimer     Much of this encounter note is an electronic transcription/translation of spoken language to printed text. The electronic translation of spoken language may permit erroneous, or at times, nonsensical words or phrases to be inadvertently transcribed. Although I have reviewed the note for such errors, some may still exist.

## 2017-07-10 ENCOUNTER — HOSPITAL ENCOUNTER (OUTPATIENT)
Dept: PHYSICAL THERAPY | Facility: HOSPITAL | Age: 77
Setting detail: THERAPIES SERIES
Discharge: HOME | End: 2017-07-10

## 2017-07-10 DIAGNOSIS — Z96.651 STATUS POST TOTAL RIGHT KNEE REPLACEMENT: Primary | ICD-10-CM

## 2017-07-10 PROCEDURE — 97161 PT EVAL LOW COMPLEX 20 MIN: CPT | Performed by: PHYSICAL THERAPIST

## 2017-07-10 PROCEDURE — 97110 THERAPEUTIC EXERCISES: CPT | Performed by: PHYSICAL THERAPIST

## 2017-07-10 PROCEDURE — G8979 MOBILITY GOAL STATUS: HCPCS | Performed by: PHYSICAL THERAPIST

## 2017-07-10 PROCEDURE — G8978 MOBILITY CURRENT STATUS: HCPCS | Performed by: PHYSICAL THERAPIST

## 2017-07-12 ENCOUNTER — HOSPITAL ENCOUNTER (OUTPATIENT)
Dept: PHYSICAL THERAPY | Facility: HOSPITAL | Age: 77
Setting detail: THERAPIES SERIES
Discharge: HOME | End: 2017-07-12

## 2017-07-12 DIAGNOSIS — Z96.651 STATUS POST TOTAL RIGHT KNEE REPLACEMENT: Primary | ICD-10-CM

## 2017-07-12 PROCEDURE — 97140 MANUAL THERAPY 1/> REGIONS: CPT | Performed by: PHYSICAL THERAPIST

## 2017-07-12 PROCEDURE — 97110 THERAPEUTIC EXERCISES: CPT | Performed by: PHYSICAL THERAPIST

## 2017-07-12 NOTE — THERAPY TREATMENT NOTE
Outpatient Physical Therapy Ortho Treatment Note   Jovita Greene     Patient Name: Zach Gutierrez  : 1940  MRN: [id number]  Today's Date: 2017      Visit Date: 2017    Visit Dx:    ICD-10-CM ICD-9-CM   1. Status post total right knee replacement Z96.651 V43.65       Patient Active Problem List   Diagnosis   • Osteoarthritis of right knee        Past Medical History:   Diagnosis Date   • Arthritis    • Dementia    • Diabetes    • Knee sprain    • ZENA (obstructive sleep apnea)     cpap        Past Surgical History:   Procedure Laterality Date   • COLONOSCOPY N/A 2017    Procedure: COLONOSCOPY;  Surgeon: Srikanth Payan MD;  Location:  [facility] OR;  Service:    • HERNIA REPAIR Bilateral     inguinal   • [place] TOTAL KNEE ARTHROPLASTY Right 2017    Procedure: TOTAL KNEE ARTHROPLASTY hima orthoalign antibiotic cement 7fr hemovac placement;  Surgeon: Bj Thrasher MD;  Location:  [facility] OR;  Service: Orthopedics   • SHOULDER SURGERY Right     RCR             PT Ortho       17 1000    Right Knee    Extension/Flexion PROM Deficit PROM knee flexion to 126 degrees   -SP      17 0800    Subjective Comments    Subjective Comments Patient reports he is very sore today.  -SP      07/10/17 0800    Posture/Observations    Observations Edema;Incision healing;Muscle atrophy  -SP    Posture/Observations Comments edema present knee to ankle; patient with mild forward flexed posture with gait  -SP    Sensation    Additional Comments decreased to light touch lateral aspect of right knee  -SP    Ankle/Foot Special Tests    John’s sign (DVT) Right:;Negative  -SP    General LE Assessment    ROM LLE ROM was WFL  -SP    ROM Detail right hip and ankle AROM wfl  -SP    Right Knee    Extension/Flexion AROM Deficit 6 to 115 degrees  -SP    Extension/Flexion PROM Deficit 5 to 120 degrees  -SP    Right Hip    Hip Flexion Gross Movement (3+/5) fair plus  -SP    Hip ABduction Gross Movement (3+/5)  fair plus  -SP    Hip ADduction Gross Movement (3+/5) fair plus  -SP    Lower Extremity    Lower Ext Manual Muscle Testing right LE strength is WFL  -SP    Right Knee    Knee Extension Gross Movement (2+/5) poor plus  -SP    Knee Flexion Gross Movement (3-/5) fair minus  -SP    Right Ankle/Foot    Ankle PF Gross Movement (3/5) fair  -SP    Ankle Dorsiflexion Gross Movement (3+/5) fair plus  -SP    RLE Quick Girth (cm)    Mid patella 45.6 cm  -SP    Other 1 47.4 cm   5 cm above mid patella  -SP    Other 2 41.6 cm   5 cm below midpatella  -SP    Transfers    Transfers, Sit-Stand [place] independent   uses UEs to assist  -SP    Gait Assessment/Treatment    Gait, [place] Level independent  -SP    Gait, Assistive Device straight cane  -SP    Gait, Gait Pattern Analysis swing-through gait  -SP    Gait, Gait Deviations decreased heel strike;forward flexed posture;leans to the left;step length decreased   cues to use cane in left   -SP    Stairs Assessment/Treatment    Stairs, [place] Level independent  -SP    Stairs, Assistive Device straight cane  -SP    Stairs, Technique Used step to step (ascending);step to step (descending)  -SP    Stairs, Comment requires hold to rail  -SP      User Key  (r) = Recorded By, (t) = Taken By, (c) = Cosigned By    Initials Name Provider Type    DARRELL Prescott, PT Physical Therapist                            PT Assessment/Plan       07/12/17 1045 07/12/17 1038    PT Assessment    Assessment Comments  Patient needs close supervision for appropriate technique with exercises.  Demonstrates improving ROM  -SP    PT Plan    PT Plan Comments Continue to progress ROM and strengthening  -SP       User Key  (r) = Recorded By, (t) = Taken By, (c) = Cosigned By    Initials Name Provider Type    DARRELL Prescott, PT Physical Therapist                Modalities       07/12/17 0800          Ice    Ice Applied Yes  -SP      Location right knee  -SP      Rx Minutes 10  "mins  -SP      Ice S/P Rx Yes  -SP        User Key  (r) = Recorded By, (t) = Taken By, (c) = Cosigned By    Initials Name Provider Type    SP Lana Prescott, PT Physical Therapist                Exercises       07/12/17 0800          Subjective Comments    Subjective Comments Patient reports he is very sore today.  -SP      Exercise 1    Exercise Name 1 heel slides  -SP      Time (Minutes) 1 6  -SP      Exercise 2    Exercise Name 2 wall slides  -SP      Exercise 3    Exercise Name 3 cycle (recumbent) seat 7  -SP      Time (Minutes) 3 6  -SP      Exercise 4    Exercise Name 4 heel raises   -SP      Reps 4 25  -SP      Exercise 5    Exercise Name 5 step ups 4\"  -SP      Reps 5 20  -SP      Exercise 6    Exercise Name 6 mini squats  -SP      Reps 6 20  -SP      Exercise 7    Exercise Name 7 SLR  -SP      Reps 7 20  -SP      Exercise 8    Exercise Name 8 SAQ  -SP      Reps 8 30  -SP      Exercise 9    Exercise Name 9 QS towel under knee/ankle  -SP      Reps 9 10  -SP      Time (Seconds) 9 5  -SP      Exercise 10    Exercise Name 10 heel prop  -SP      Cueing 10 Other (comment)   patient repeatedly takes self out of stretch, cues to cont  -SP      Time (Minutes) 10 4  -SP        User Key  (r) = Recorded By, (t) = Taken By, (c) = Cosigned By    Initials Name Provider Type    SP Lana Prescott, PT Physical Therapist                        Manual Rx (last 36 hours)      Manual Treatments       07/12/17 0800          Manual Rx 1    Manual Rx 1 Location right knee  -SP      Manual Rx 1 Type PROM flexion stretch in short sit  -SP      Manual Rx 1 Duration 10 x 10 sec  -SP      Manual Rx 2    Manual Rx 2 Location right patella  -SP      Manual Rx 2 Type patellar mobilizations  -SP      Manual Rx 3    Manual Rx 3 Location right knee  -SP      Manual Rx 3 Type PROM knee extension stretch  -SP      Manual Rx 3 Duration 15 sec x 3  -SP        User Key  (r) = Recorded By, (t) = Taken By, (c) = Cosigned By    " Initials Name Provider Type    SP Lana Prescott PT Physical Therapist                        Outcome Measures       07/10/17 0800          Lower Extremity Functional Index    Any of your usual work, housework or school activities 1  -SP      Your usual hobbies, recreational or sporting activities 2  -SP      Getting into or out of the bath 3  -SP      Walking between rooms 4  -SP      Putting on your shoes or socks 2  -SP      Squatting 3  -SP      Lifting an object, like a bag of groceries from the floor 4  -SP      Performing light activities around your home 2  -SP      Performing heavy activities around your home 2  -SP      Getting into or out of a car 4  -SP      Walking 2 blocks 4  -SP      Walking a mile 2  -SP      Going up or down 10 stairs (about 1 flight of stairs) 2  -SP      Standing for 1 hour 2  -SP      Sitting for 1 hour 3  -SP      Running on even ground 0  -SP      Making sharp turns while running fast 0  -SP      Hopping 0  -SP      Rolling over in bed 3  -SP      Functional Assessment    Outcome Measure Options Lower Extremity Functional Scale (LEFS)  -SP        User Key  (r) = Recorded By, (t) = Taken By, (c) = Cosigned By    Initials Name Provider Type    SP Lana Prescott PT Physical Therapist            Time Calculation:   Start Time: 0800  Stop Time: 0930  Time Calculation (min): 90 min    Therapy Charges for Today     Code Description Service Date Service Provider Modifiers Qty    34178651768 HC PT HOT OR COLD PACK TREAT MCARE 7/12/2017 Lana Prescott, PT GP 1    10540732650 HC PT MANUAL THERAPY EA 15 MIN 7/12/2017 Lana Prescott, PT GP 1    40055025944 HC PT THER PROC EA 15 MIN 7/12/2017 Lana Prescott, PT GP 1                    Lana Prescott, PT  7/12/2017

## 2017-07-14 ENCOUNTER — HOSPITAL ENCOUNTER (OUTPATIENT)
Dept: PHYSICAL THERAPY | Facility: HOSPITAL | Age: 77
Setting detail: THERAPIES SERIES
Discharge: HOME | End: 2017-07-14

## 2017-07-14 ENCOUNTER — LAB (OUTPATIENT)
Dept: LAB | Facility: HOSPITAL | Age: 77
End: 2017-07-14

## 2017-07-14 ENCOUNTER — OFFICE VISIT (OUTPATIENT)
Dept: ORTHOPEDIC SURGERY | Facility: CLINIC | Age: 77
End: 2017-07-14

## 2017-07-14 VITALS — TEMPERATURE: 99.6 F

## 2017-07-14 DIAGNOSIS — Z96.651 STATUS POST TOTAL RIGHT KNEE REPLACEMENT: ICD-10-CM

## 2017-07-14 DIAGNOSIS — Z96.651 STATUS POST TOTAL RIGHT KNEE REPLACEMENT: Primary | ICD-10-CM

## 2017-07-14 LAB
BASOPHILS # BLD AUTO: 0.04 10*3/MM3 (ref 0–0.2)
BASOPHILS NFR BLD AUTO: 0.6 % (ref 0–2)
CRP SERPL-MCNC: 1.25 MG/DL (ref 0–0.5)
DEPRECATED RDW RBC AUTO: 43.5 FL (ref 37–54)
EOSINOPHIL # BLD AUTO: 0.07 10*3/MM3 (ref 0.1–0.3)
EOSINOPHIL NFR BLD AUTO: 1 % (ref 0–4)
ERYTHROCYTE [DISTWIDTH] IN BLOOD BY AUTOMATED COUNT: 13 % (ref 11.5–14.5)
ERYTHROCYTE [SEDIMENTATION RATE] IN BLOOD: 18 MM/HR (ref 0–20)
HCT VFR BLD AUTO: 32.1 % (ref 42–52)
HGB BLD-MCNC: 10.8 G/DL (ref 14–18)
IMM GRANULOCYTES # BLD: 0.02 10*3/MM3 (ref 0–0.03)
IMM GRANULOCYTES NFR BLD: 0.3 % (ref 0–0.5)
LYMPHOCYTES # BLD AUTO: 1.12 10*3/MM3 (ref 0.6–4.8)
LYMPHOCYTES NFR BLD AUTO: 15.6 % (ref 20–45)
MCH RBC QN AUTO: 31.2 PG (ref 27–31)
MCHC RBC AUTO-ENTMCNC: 33.6 G/DL (ref 31–37)
MCV RBC AUTO: 92.8 FL (ref 80–94)
MONOCYTES # BLD AUTO: 0.43 10*3/MM3 (ref 0–1)
MONOCYTES NFR BLD AUTO: 6 % (ref 3–8)
NEUTROPHILS # BLD AUTO: 5.48 10*3/MM3 (ref 1.5–8.3)
NEUTROPHILS NFR BLD AUTO: 76.5 % (ref 45–70)
NRBC BLD MANUAL-RTO: 0 /100 WBC (ref 0–0)
PLATELET # BLD AUTO: 371 10*3/MM3 (ref 140–500)
PMV BLD AUTO: 8.3 FL (ref 7.4–10.4)
RBC # BLD AUTO: 3.46 10*6/MM3 (ref 4.7–6.1)
WBC NRBC COR # BLD: 7.16 10*3/MM3 (ref 4.8–10.8)

## 2017-07-14 PROCEDURE — 36415 COLL VENOUS BLD VENIPUNCTURE: CPT

## 2017-07-14 PROCEDURE — 99024 POSTOP FOLLOW-UP VISIT: CPT | Performed by: NURSE PRACTITIONER

## 2017-07-14 PROCEDURE — 86140 C-REACTIVE PROTEIN: CPT | Performed by: NURSE PRACTITIONER

## 2017-07-14 PROCEDURE — 97110 THERAPEUTIC EXERCISES: CPT

## 2017-07-14 PROCEDURE — 85652 RBC SED RATE AUTOMATED: CPT | Performed by: NURSE PRACTITIONER

## 2017-07-14 PROCEDURE — 85025 COMPLETE CBC W/AUTO DIFF WBC: CPT

## 2017-07-14 PROCEDURE — 97140 MANUAL THERAPY 1/> REGIONS: CPT

## 2017-07-14 RX ORDER — DOXYCYCLINE HYCLATE 100 MG/1
100 TABLET, DELAYED RELEASE ORAL 2 TIMES DAILY
Qty: 28 TABLET | Refills: 0 | Status: SHIPPED | OUTPATIENT
Start: 2017-07-14 | End: 2017-08-16

## 2017-07-14 NOTE — THERAPY TREATMENT NOTE
"    Outpatient Physical Therapy Ortho Treatment Note   Jovita Greene     Patient Name: Zach Gutierrez  : 1940  MRN: [id number]  Today's Date: 2017      Visit Date: 2017    Visit Dx:    ICD-10-CM ICD-9-CM   1. Status post total right knee replacement Z96.651 V43.65       Patient Active Problem List   Diagnosis   • Osteoarthritis of right knee        Past Medical History:   Diagnosis Date   • Arthritis    • Dementia    • Diabetes    • Knee sprain    • ZENA (obstructive sleep apnea)     cpap        Past Surgical History:   Procedure Laterality Date   • COLONOSCOPY N/A 2017    Procedure: COLONOSCOPY;  Surgeon: Srikanth Payan MD;  Location:  [facility] OR;  Service:    • HERNIA REPAIR Bilateral     inguinal   • [place] TOTAL KNEE ARTHROPLASTY Right 2017    Procedure: TOTAL KNEE ARTHROPLASTY hima orthoalign antibiotic cement 7fr hemovac placement;  Surgeon: Bj Thrasher MD;  Location: [facility] OR;  Service: Orthopedics   • SHOULDER SURGERY Right     RCR             PT Ortho       17 0800    Subjective Comments    Subjective Comments Pt states his knee is \"really sore today\" ; states he went for a walk yesterday but that it didn't hurt then  -    Subjective Pain    Able to rate subjective pain? yes  -    Pre-Treatment Pain Level 6  -    Posture/Observations    Posture/Observations Comments continued edema (R) knee to ankle; area of redness mid incision - pt states his sister noted increased redness but he wasn't sure  -      17 1000    Right Knee    Extension/Flexion PROM Deficit PROM knee flexion to 126 degrees   -SP      17 0800    Subjective Comments    Subjective Comments Patient reports he is very sore today.  -SP      User Key  (r) = Recorded By, (t) = Taken By, (c) = Cosigned By    Initials Name Provider Type     Coy Rojas, PTA Physical Therapy Assistant    SP Lana Prescott, PT Physical Therapist                            PT " "Assessment/Plan       07/14/17 1013       PT Assessment    Assessment Comments Pt with decreased pain following moist heat; reps modified based on pt complaint of increased pain and was able to tolerate ex's well; continued with area of redness mid incision - marked borders for observation  -     PT Plan    PT Plan Comments Pt to continue with HEP; encouraged pt to stop in MD office on his way out to have them look at redness in knee considering the weekend ahead  -       User Key  (r) = Recorded By, (t) = Taken By, (c) = Cosigned By    Initials Name Provider Type     Coy Rojas PTA Physical Therapy Assistant                Modalities       07/14/17 0800          Moist Heat    Location distal quad and post knee (R)  -      Rx Minutes 10 mins  -[facility] Prior to Rx Yes  -      Ice    Ice Applied No  -      Patient denies application of Ice Yes  -      Patient reports will apply ice at home to involved area Yes  -        User Key  (r) = Recorded By, (t) = Taken By, (c) = Cosigned By    Initials Name Provider Type     Coy Rojas PTA Physical Therapy Assistant                Exercises       07/14/17 0800          Subjective Comments    Subjective Comments Pt states his knee is \"really sore today\" ; states he went for a walk yesterday but that it didn't hurt then  -      Subjective Pain    Able to rate subjective pain? yes  -      Pre-Treatment Pain Level 6  -      Exercise 1    Exercise Name 1 heel slides  -      Time (Minutes) 1 6  -      Exercise 2    Exercise Name 2 wall slides  -      Exercise 3    Exercise Name 3 cycle (recumbent) seat 7  -      Time (Minutes) 3 6  -      Exercise 4    Exercise Name 4 heel raises   -      Reps 4 25  -      Exercise 5    Exercise Name 5 step ups 4\"  -      Reps 5 10   each (B)  -      Exercise 6    Exercise Name 6 mini squats  -      Reps 6 25  -      Exercise 7    Exercise Name 7 SLR  -      Reps 7 20  -      Exercise 8 "    Exercise Name 8 SAQ  -MH      Reps 8 25  -MH      Exercise 9    Exercise Name 9 QS towel under knee  -MH      Reps 9 20  -MH      Time (Seconds) 9 5  -MH      Exercise 10    Exercise Name 10 heel prop  -MH      Cueing 10 Other (comment)  -MH      Time (Minutes) 10 4   Set for 5 min but pt removed heel from roll  -MH      Exercise 11    Exercise Name 11 NWB gastroc stretch  -MH      Cueing 11 Verbal  -MH      Reps 11 10  -MH      Time (Seconds) 11 10  -MH        User Key  (r) = Recorded By, (t) = Taken By, (c) = Cosigned By    Initials Name Provider Type     Coy Rojas PTA Physical Therapy Assistant                        Manual Rx (last 36 hours)      Manual Treatments       07/14/17 0800          Manual Rx 1    Manual Rx 1 Location right knee  -MH      Manual Rx 1 Type PROM flexion stretch in short sit  -      Manual Rx 1 Duration 10 x 10 sec  -      Manual Rx 2    Manual Rx 2 Location right patella  -MH      Manual Rx 2 Type patellar mobilizations  -      Manual Rx 3    Manual Rx 3 Location right knee  -MH      Manual Rx 3 Type PROM knee extension stretch  -      Manual Rx 3 Duration 15 sec x 3  -MH        User Key  (r) = Recorded By, (t) = Taken By, (c) = Cosigned By    Initials Name Provider Type     Coy Rojas PTA Physical Therapy Assistant                    Therapy Education       07/14/17 1013          Therapy Education    Given HEP;Symptoms/condition management  -      Program Reinforced  -      How Provided Verbal  -MH      Provided to Patient  -MH      Level of Understanding Teach back education performed;Verbalized;Demonstrated  -        User Key  (r) = Recorded By, (t) = Taken By, (c) = Cosigned By    Initials Name Provider Type     Coy Rojas PTA Physical Therapy Assistant                Time Calculation:   Start Time: 0800  Stop Time: 0933  Time Calculation (min): 93 min    Therapy Charges for Today     Code Description Service Date Service Provider Modifiers Qty     50469915802 HC PT HOT OR COLD PACK TREAT MCARE 7/14/2017 Coy Rojas, PTA GP 1    56446387594 HC PT MANUAL THERAPY EA 15 MIN 7/14/2017 Coy Rojas, PTA GP 1    35362553680 HC PT THER PROC EA 15 MIN 7/14/2017 Coy Rojas, PTA GP 1                    Coy Rojas, PTA  7/14/2017

## 2017-07-14 NOTE — PROGRESS NOTES
Subjective:     Patient ID: Zach Gutierrez is a 76 y.o. male.    Chief Complaint: status post total knee arthroplasty, right 06/20/2017    History of Present Illness    Mr. Gutierrez is 76 y.o male who presents with his spouse and a reported 24 hour history of increased erythema over site of incision. The patients wife and his daughters began noticing one day ago. He presented to PT this am and PT was also concerned about erythema. Denies presence of drainage, incision healed very well according to patients wife. His wife also reports presence of incontinence since surgery, decreased appetite and decreased energy. Denies presence of numbness and tingling right lower extremity. Pain has been well controlled with oral medications.      Social History     Occupational History   • Not on file.     Social History Main Topics   • Smoking status: Former Smoker   • Smokeless tobacco: Never Used   • Alcohol use Yes      Comment: one jim/week   • Drug use: No   • Sexual activity: Defer      Past Medical History:   Diagnosis Date   • Arthritis    • Dementia    • Diabetes    • Knee sprain    • ZENA (obstructive sleep apnea)     cpap     Past Surgical History:   Procedure Laterality Date   • COLONOSCOPY N/A 5/19/2017    Procedure: COLONOSCOPY;  Surgeon: Srikanth Payan MD;  Location: [facility] OR;  Service:    • HERNIA REPAIR Bilateral     inguinal   • [place] TOTAL KNEE ARTHROPLASTY Right 6/20/2017    Procedure: TOTAL KNEE ARTHROPLASTY hima orthoalign antibiotic cement 7fr hemovac placement;  Surgeon: Bj Thrasher MD;  Location: [facility] OR;  Service: Orthopedics   • SHOULDER SURGERY Right 2007    RCR       History reviewed. No pertinent family history.      Review of Systems   Constitutional: Negative for chills, diaphoresis, fever and unexpected weight change.   HENT: Negative for hearing loss, nosebleeds, sore throat and tinnitus.    Eyes: Negative for pain and visual disturbance.   Respiratory: Negative for cough,  shortness of breath and wheezing.    Cardiovascular: Negative for chest pain and palpitations.   Gastrointestinal: Negative for abdominal pain, diarrhea, nausea and vomiting.   Endocrine: Negative for cold intolerance, heat intolerance and polydipsia.   Genitourinary: Negative for difficulty urinating, dysuria and hematuria.   Musculoskeletal: Positive for joint swelling and myalgias. Negative for arthralgias.   Skin: Positive for wound. Negative for rash.   Allergic/Immunologic: Negative for environmental allergies.   Neurological: Negative for dizziness, syncope and numbness.   Hematological: Does not bruise/bleed easily.   Psychiatric/Behavioral: Negative for dysphoric mood and sleep disturbance. The patient is not nervous/anxious.            Objective:  Physical Exam  General: No acute distress.  Eyes: conjunctiva clear; pupils equally round and reactive  ENT: external ears and nose atraumatic; oropharynx clear  CV: no peripheral edema  Resp: normal respiratory effort  Skin: no rashes or wounds; normal turgor  Psych: mood and affect appropriate; recent and remote memory intact    There were no vitals filed for this visit.  There were no vitals filed for this visit.  There is no height or weight on file to calculate BMI.     Ortho Exam      Right knee exam:  + mild erythema at incision site, incision well healed, no drainage present  + warmth and moderate edema at right knee  Negative John's sign  + sensation all aspects right lower extremity  2+ pulses present    Assessment:       1. Status post total right knee replacement          Plan:  1. Discussed plan of care with patient. Will start him on doxycycline 100 mg, BID x 14 days. He already has appointment to follow-up with Dr. Thrasher on Wednesday July 19th for follow-up. Discussed with the patient and his wife to follow-up with PCP for all other concerns. Will send for stat lab work to rule out infection. Will call patient with results. Patient verbalized  understanding of all information and agrees with plan of care. Denies all other concerns present at this time.

## 2017-07-17 ENCOUNTER — HOSPITAL ENCOUNTER (OUTPATIENT)
Dept: PHYSICAL THERAPY | Facility: HOSPITAL | Age: 77
Setting detail: THERAPIES SERIES
Discharge: HOME | End: 2017-07-17

## 2017-07-17 PROCEDURE — 97110 THERAPEUTIC EXERCISES: CPT | Performed by: PHYSICAL THERAPIST

## 2017-07-17 PROCEDURE — 97140 MANUAL THERAPY 1/> REGIONS: CPT | Performed by: PHYSICAL THERAPIST

## 2017-07-17 NOTE — THERAPY TREATMENT NOTE
Outpatient Physical Therapy Ortho Treatment Note   Jovita Greene     Patient Name: Zach Gutierrez  : 1940  MRN: [id number]  Today's Date: 2017      Visit Date: 2017    Visit Dx:  No diagnosis found.    Patient Active Problem List   Diagnosis   • Osteoarthritis of right knee   • Status post total right knee replacement        Past Medical History:   Diagnosis Date   • Arthritis    • Dementia    • Diabetes    • Knee sprain    • ZENA (obstructive sleep apnea)     cpap        Past Surgical History:   Procedure Laterality Date   • COLONOSCOPY N/A 2017    Procedure: COLONOSCOPY;  Surgeon: Srikanth Payan MD;  Location: [facility] OR;  Service:    • HERNIA REPAIR Bilateral     inguinal   • [place] TOTAL KNEE ARTHROPLASTY Right 2017    Procedure: TOTAL KNEE ARTHROPLASTY hima orthoalign antibiotic cement 7fr hemovac placement;  Surgeon: Bj Thrasher MD;  Location: [facility] OR;  Service: Orthopedics   • SHOULDER SURGERY Right     RCR             PT Ortho       17 1600    Subjective Comments    Subjective Comments Patient reports that he is a little sore.  Patient's spouse concerned about patient's edema but agrees that he has been up walking more.  He does not like ice but has been elevating his leg at home  -SP    Subjective Pain    Able to rate subjective pain? yes  -SP    Pre-Treatment Pain Level 6  -SP    Posture/Observations    Posture/Observations Comments Patient continues to have moderate edema, mild redness observed anterior aspect of right knee  -SP      User Key  (r) = Recorded By, (t) = Taken By, (c) = Cosigned By    Initials Name Provider Type    SP Lana Prescott, PT Physical Therapist                            PT Assessment/Plan       17 8964       PT Assessment    Assessment Comments Patient continues to have significant edema and redness, but family reporting increased weight bearing activity at home.  Patient continues to require simple commands and  "cues for technique with exercises  -SP     PT Plan    PT Plan Comments Patient to follow up with Dr. Thrasher this week.  Continue to progress ROM and strengthening for right LE  -SP       User Key  (r) = Recorded By, (t) = Taken By, (c) = Cosigned By    Initials Name Provider Type    SP Lana Prescott, PT Physical Therapist                Modalities       07/17/17 1600          Moist Heat    MH Applied Yes  -SP      Location distal quad and post knee (R)  -SP      Rx Minutes 10 mins  -SP      MH Prior to Rx Yes  -SP      Ice    Ice Applied No  -SP      Patient denies application of Ice Yes  -SP      Patient reports will apply ice at home to involved area Yes  -SP        User Key  (r) = Recorded By, (t) = Taken By, (c) = Cosigned By    Initials Name Provider Type    DARRELL Prescott, PT Physical Therapist                Exercises       07/17/17 1600          Subjective Comments    Subjective Comments Patient reports that he is a little sore.  Patient's spouse concerned about patient's edema but agrees that he has been up walking more.  He does not like ice but has been elevating his leg at home  -SP      Subjective Pain    Able to rate subjective pain? yes  -SP      Pre-Treatment Pain Level 6  -SP      Exercise 1    Exercise Name 1 heel slides  -SP      Time (Minutes) 1 6  -SP      Exercise 3    Exercise Name 3 cycle (recumbent) seat 7  -SP      Time (Minutes) 3 7  -SP      Exercise 4    Exercise Name 4 heel raises   -SP      Reps 4 25  -SP      Exercise 5    Exercise Name 5 step ups 4\"  -SP      Reps 5 20   each (B)  -SP      Exercise 6    Exercise Name 6 mini squats  -SP      Reps 6 25  -SP      Exercise 7    Exercise Name 7 SLR  -SP      Reps 7 20  -SP      Exercise 8    Exercise Name 8 SAQ  -SP      Reps 8 25  -SP      Exercise 9    Exercise Name 9 QS towel under knee  -SP      Reps 9 20  -SP      Time (Seconds) 9 5  -SP      Exercise 10    Exercise Name 10 heel prop (on large blue bolster)  -SP  "     Time (Minutes) 10 4   Set for 5 min but pt removed heel from roll  -SP      Exercise 11    Exercise Name 11 NWB gastroc stretch  -SP      Cueing 11 Verbal  -SP      Reps 11 10  -SP      Time (Seconds) 11 10  -SP        User Key  (r) = Recorded By, (t) = Taken By, (c) = Cosigned By    Initials Name Provider Type    DARRELL Prescott PT Physical Therapist                        Manual Rx (last 36 hours)      Manual Treatments       07/17/17 1647          Manual Rx 1    Manual Rx 1 Location right knee  -SP      Manual Rx 1 Type PROM flexion stretch in short sit  -SP      Manual Rx 1 Duration 10 x 10 sec  -SP      Manual Rx 2    Manual Rx 2 Location right patella  -SP      Manual Rx 2 Type patellar mobilizations  -SP      Manual Rx 3    Manual Rx 3 Location right knee  -SP      Manual Rx 3 Type PROM knee extension stretch  -SP      Manual Rx 3 Duration 15 sec x 3  -SP        User Key  (r) = Recorded By, (t) = Taken By, (c) = Cosigned By    Initials Name Provider Type    DARRELL Prescott PT Physical Therapist                    Therapy Education       07/17/17 1643          Therapy Education    Given Edema management  -SP      Program Reinforced  -SP      How Provided Verbal  -SP      Provided to Patient;Caregiver  -SP      Level of Understanding Verbalized  -SP        User Key  (r) = Recorded By, (t) = Taken By, (c) = Cosigned By    Initials Name Provider Type    DARRELL Prescott PT Physical Therapist                Time Calculation:   Start Time: 1520  Stop Time: 1635  Time Calculation (min): 75 min    Therapy Charges for Today     Code Description Service Date Service Provider Modifiers Qty    75599785359 HC PT HOT OR COLD PACK TREAT MCARE 7/17/2017 Lana Prescott, PT GP 1    73863451063 HC PT MANUAL THERAPY EA 15 MIN 7/17/2017 Lana Prescott PT GP 1    56915352091 HC PT THER PROC EA 15 MIN 7/17/2017 Lana Prescott, PT GP 1                    Lana  Katerina Prescott, PT  7/17/2017

## 2017-07-19 ENCOUNTER — OFFICE VISIT (OUTPATIENT)
Dept: ORTHOPEDIC SURGERY | Facility: CLINIC | Age: 77
End: 2017-07-19

## 2017-07-19 ENCOUNTER — HOSPITAL ENCOUNTER (OUTPATIENT)
Dept: PHYSICAL THERAPY | Facility: HOSPITAL | Age: 77
Setting detail: THERAPIES SERIES
Discharge: HOME | End: 2017-07-19

## 2017-07-19 DIAGNOSIS — R52 PAIN: Primary | ICD-10-CM

## 2017-07-19 DIAGNOSIS — Z96.651 STATUS POST TOTAL RIGHT KNEE REPLACEMENT: ICD-10-CM

## 2017-07-19 DIAGNOSIS — Z96.651 STATUS POST TOTAL RIGHT KNEE REPLACEMENT: Primary | ICD-10-CM

## 2017-07-19 PROCEDURE — 97140 MANUAL THERAPY 1/> REGIONS: CPT | Performed by: PHYSICAL THERAPIST

## 2017-07-19 PROCEDURE — 99024 POSTOP FOLLOW-UP VISIT: CPT | Performed by: ORTHOPAEDIC SURGERY

## 2017-07-19 PROCEDURE — 73562 X-RAY EXAM OF KNEE 3: CPT | Performed by: ORTHOPAEDIC SURGERY

## 2017-07-19 PROCEDURE — 97110 THERAPEUTIC EXERCISES: CPT | Performed by: PHYSICAL THERAPIST

## 2017-07-19 RX ORDER — FERROUS SULFATE TAB EC 324 MG (65 MG FE EQUIVALENT) 324 (65 FE) MG
324 TABLET DELAYED RESPONSE ORAL
COMMUNITY
End: 2017-10-04

## 2017-07-19 RX ORDER — DOCUSATE SODIUM 250 MG
250 CAPSULE ORAL DAILY
COMMUNITY
End: 2017-10-04

## 2017-07-19 NOTE — PROGRESS NOTES
Subjective: Status post right total knee arthroplasty     Patient ID: Zach Gutierrez is a 76 y.o. male.    Chief Complaint:    History of Present Illness 76-year-old male is now 4 weeks out from surgery and is doing extremely well and ambulating independently with a cane for support.  All of his preoperative pain has resolved.  His only complaints and surgery did develop urinary incontinence.  Prior to surgery he was taking Flomax but since the surgery this become quite problematic.  Had no problem with regard to the knee.  He is off all pain medication.       Social History     Occupational History   • Not on file.     Social History Main Topics   • Smoking status: Former Smoker   • Smokeless tobacco: Never Used   • Alcohol use Yes      Comment: one jim/week   • Drug use: No   • Sexual activity: Defer      Review of Systems   Constitutional: Negative for chills, diaphoresis, fever and unexpected weight change.   HENT: Negative for hearing loss, nosebleeds, sore throat and tinnitus.    Eyes: Negative for pain and visual disturbance.   Respiratory: Negative for cough, shortness of breath and wheezing.    Cardiovascular: Negative for chest pain and palpitations.   Gastrointestinal: Negative for abdominal pain, diarrhea, nausea and vomiting.   Endocrine: Negative for cold intolerance, heat intolerance and polydipsia.   Genitourinary: Negative for difficulty urinating, dysuria and hematuria.   Musculoskeletal: Positive for arthralgias. Negative for joint swelling and myalgias.   Skin: Negative for rash and wound.   Allergic/Immunologic: Negative for environmental allergies.   Neurological: Negative for dizziness, syncope and numbness.   Hematological: Does not bruise/bleed easily.   Psychiatric/Behavioral: Negative for dysphoric mood and sleep disturbance. The patient is not nervous/anxious.    All other systems reviewed and are negative.        Past Medical History:   Diagnosis Date   • Arthritis    • Dementia     • Diabetes    • Knee sprain    • ZENA (obstructive sleep apnea)     cpap     Past Surgical History:   Procedure Laterality Date   • COLONOSCOPY N/A 5/19/2017    Procedure: COLONOSCOPY;  Surgeon: Srikanth Payan MD;  Location:  [facility];  Service:    • HERNIA REPAIR Bilateral     inguinal   • CA TOTAL KNEE ARTHROPLASTY Right 6/20/2017    Procedure: TOTAL KNEE ARTHROPLASTY hima orthoalign antibiotic cement 7fr hemovac placement;  Surgeon: Bj Thrasher MD;  Location:  [facility];  Service: Orthopedics   • SHOULDER SURGERY Right 2007    RCR     No family history on file.      Objective:  There were no vitals filed for this visit.  There were no vitals filed for this visit.  There is no height or weight on file to calculate BMI.       Ortho Exam  AP lateral sunrise view of his knee shows excellent position of the implant all 3 views.  Examination of the knee shows no swelling effusion.  Mild erythema around the wound but there is minimal to no warmth and there is no drainage.  He has full extension flexion past 120° with no instability throughout the arc of motion.  His calf is nontender.  No motor deficit good distal pulses no sensory loss skin is cool to touch.    Assessment:       1. Pain    2. Status post total right knee replacement          Plan:        At this point he can DC the adult aspirin to go back, baby aspirin.  Continue strengthening program.  He does have an appointment with Dr. Yang regarding his urinary incontinence.  Return to see me in 4 weeks.    Work Status:    LATRELL query complete.    Orders:  Orders Placed This Encounter   Procedures   • XR Knee 3 View Right       Medications:  New Medications Ordered This Visit   Medications   • ferrous sulfate 324 (65 FE) MG tablet delayed-release EC tablet     Sig: Take 324 mg by mouth Daily With Breakfast.   • docusate sodium (COLACE) 250 MG capsule     Sig: Take 250 mg by mouth Daily.       Followup:  Return in about 4 weeks (around  8/16/2017).          Dragon transcription disclaimer     Much of this encounter note is an electronic transcription/translation of spoken language to printed text. The electronic translation of spoken language may permit erroneous, or at times, nonsensical words or phrases to be inadvertently transcribed. Although I have reviewed the note for such errors, some may still exist.

## 2017-07-19 NOTE — THERAPY TREATMENT NOTE
Outpatient Physical Therapy Ortho Treatment Note   Jov[facility]     Patient Name: Zach Gutierrez  : 1940  MRN: [id number]  Today's Date: 2017      Visit Date: 2017    Visit Dx:    ICD-10-CM ICD-9-CM   1. Status post total right knee replacement Z96.651 V43.65       Patient Active Problem List   Diagnosis   • Osteoarthritis of right knee   • Status post total right knee replacement        Past Medical History:   Diagnosis Date   • Arthritis    • Dementia    • Diabetes    • Knee sprain    • ZENA (obstructive sleep apnea)     cpap        Past Surgical History:   Procedure Laterality Date   • COLONOSCOPY N/A 2017    Procedure: COLONOSCOPY;  Surgeon: Srikanth Payan MD;  Location:  [facility];  Service:    • HERNIA REPAIR Bilateral     inguinal   • ME TOTAL KNEE ARTHROPLASTY Right 2017    Procedure: TOTAL KNEE ARTHROPLASTY hima orthoalign antibiotic cement 7fr hemovac placement;  Surgeon: Bj Thrasher MD;  Location:  [facility];  Service: Orthopedics   • SHOULDER SURGERY Right     RCR             PT Ortho       17 0928    Subjective Comments    Subjective Comments Patient reports that he is feeling good with little pain.   -GC    Posture/Observations    Posture/Observations Comments Patient continues to have moderate edema with decreased redness on the anterior aspect of the knee.  -    Right Knee    Extension/Flexion AROM Deficit 4-133 degrees post stretch  -GC      17 1600    Subjective Comments    Subjective Comments Patient reports that he is a little sore.  Patient's spouse concerned about patient's edema but agrees that he has been up walking more.  He does not like ice but has been elevating his leg at home  -SP    Subjective Pain    Able to rate subjective pain? yes  -SP    Pre-Treatment Pain Level 6  -SP    Posture/Observations    Posture/Observations Comments Patient continues to have moderate edema, mild redness observed anterior aspect of right knee   "-SP      User Key  (r) = Recorded By, (t) = Taken By, (c) = Cosigned By    Initials Name Provider Type    SP Lana Prescott, PT Physical Therapist    CARLOS Dockery, PT Physical Therapist                            PT Assessment/Plan       07/19/17 1300       PT Assessment    Assessment Comments Patient continues to have moderate edema with diminished redness on the anterior aspect of the knee. Patient appears to have increased toleration with weight bearing. Patient tolerated progression to strengthening exercises without complaints.  -GC     PT Plan    PT Plan Comments Patient to continue with current treatment plan to progress ROM and strength in right LE.  -GC       User Key  (r) = Recorded By, (t) = Taken By, (c) = Cosigned By    Initials Name Provider Type    CARLOS Dockery, PT Physical Therapist                Modalities       07/19/17 0928          Moist Heat    MH Applied Yes  -GC      Location distal quad and post knee (R)  -GC      Rx Minutes 10 mins  -GC      MH Prior to Rx Yes  -GC      Ice    Ice Applied No  -GC      Patient denies application of Ice Yes  -GC      Patient reports will apply ice at home to involved area Yes  -GC        User Key  (r) = Recorded By, (t) = Taken By, (c) = Cosigned By    Initials Name Provider Type    CARLOS Dockery, PT Physical Therapist                Exercises       07/19/17 0918          Subjective Comments    Subjective Comments Patient reports that he is feeling good with little pain.   -GC      Exercise 1    Exercise Name 1 heel slides  -GC      Time (Minutes) 1 6  -GC      Exercise 3    Exercise Name 3 cycle (recumbent) seat 7  -GC      Time (Minutes) 3 6  -GC      Exercise 4    Exercise Name 4 heel raises   -GC      Reps 4 30  -GC      Exercise 5    Exercise Name 5 step ups 4\"  -GC      Reps 5 20   each (B)  -GC      Exercise 6    Exercise Name 6 mini squats  -GC      Reps 6 30  -GC      Exercise 7    Exercise Name 7 SLR  -GC      Reps 7 30  -GC      " Exercise 8    Exercise Name 8 SAQ  -GC      Reps 8 30  -GC      Exercise 9    Exercise Name 9 QS towel under knee  -GC      Reps 9 25  -GC      Time (Seconds) 9 5  -GC      Exercise 10    Exercise Name 10 heel prop (on large blue bolster)  -GC      Time (Minutes) 10 4   Set for 5 min but pt removed heel from roll  -GC      Exercise 11    Exercise Name 11 NWB gastroc stretch  -GC      Cueing 11 Verbal  -GC      Reps 11 10  -GC      Time (Seconds) 11 10  -GC        User Key  (r) = Recorded By, (t) = Taken By, (c) = Cosigned By    Initials Name Provider Type     Juice Dockery, PT Physical Therapist                        Manual Rx (last 36 hours)      Manual Treatments       07/19/17 0928          Manual Rx 1    Manual Rx 1 Location right knee  -GC      Manual Rx 1 Type PROM flexion stretch in short sit  -GC      Manual Rx 1 Duration 10 x 10 sec  -GC      Manual Rx 2    Manual Rx 2 Location right patella  -GC      Manual Rx 2 Type patellar mobilizations  -GC      Manual Rx 3    Manual Rx 3 Location right knee  -GC      Manual Rx 3 Type PROM knee extension stretch  -GC      Manual Rx 3 Duration 15 sec x 3  -GC        User Key  (r) = Recorded By, (t) = Taken By, (c) = Cosigned By    Initials Name Provider Type    GC Juice Dockery PT Physical Therapist                        Time Calculation:   Start Time: 0928  Stop Time: 1045  Time Calculation (min): 77 min    Therapy Charges for Today     Code Description Service Date Service Provider Modifiers Qty    56678787359 HC PT HOT OR COLD PACK TREAT MCARE 7/19/2017 Juice Dockery, PT GP 1    65005455438 HC PT THER PROC EA 15 MIN 7/19/2017 Juice Dockery PT GP 1    96010900610 HC PT MANUAL THERAPY EA 15 MIN 7/19/2017 Juice Dockery PT GP 1            Treatment and note completed by Stephany Sauceda, PT student        Juice Dockery PT  7/19/2017

## 2017-07-21 ENCOUNTER — HOSPITAL ENCOUNTER (OUTPATIENT)
Dept: PHYSICAL THERAPY | Facility: HOSPITAL | Age: 77
Setting detail: THERAPIES SERIES
Discharge: HOME | End: 2017-07-21

## 2017-07-21 DIAGNOSIS — Z96.651 STATUS POST TOTAL RIGHT KNEE REPLACEMENT: Primary | ICD-10-CM

## 2017-07-21 PROCEDURE — 97110 THERAPEUTIC EXERCISES: CPT

## 2017-07-21 PROCEDURE — 97140 MANUAL THERAPY 1/> REGIONS: CPT

## 2017-07-21 NOTE — THERAPY TREATMENT NOTE
Outpatient Physical Therapy Ortho Treatment Note   [facility]     Patient Name: Zach Gutierrez  : 1940  MRN: [id number]  Today's Date: 2017      Visit Date: 2017    Visit Dx:    ICD-10-CM ICD-9-CM   1. Status post total right knee replacement Z96.651 V43.65       Patient Active Problem List   Diagnosis   • Osteoarthritis of right knee   • Status post total right knee replacement        Past Medical History:   Diagnosis Date   • Arthritis    • Dementia    • Diabetes    • Knee sprain    • ZENA (obstructive sleep apnea)     cpap        Past Surgical History:   Procedure Laterality Date   • COLONOSCOPY N/A 2017    Procedure: COLONOSCOPY;  Surgeon: Srikanth Payan MD;  Location:  [facility];  Service:    • HERNIA REPAIR Bilateral     inguinal   • MS TOTAL KNEE ARTHROPLASTY Right 2017    Procedure: TOTAL KNEE ARTHROPLASTY hima orthoalign antibiotic cement 7fr hemovac placement;  Surgeon: Bj Thrasher MD;  Location:  [facility];  Service: Orthopedics   • SHOULDER SURGERY Right     RCR             PT Ortho       17 0900    Subjective Comments    Subjective Comments Pt reports he woke this morning with his knee feeling more sore and painful than it has been  -  denies any change in activity yesterday  -    Subjective Pain    Able to rate subjective pain? yes  -    Pre-Treatment Pain Level 7  -      17 0928    Subjective Comments    Subjective Comments Patient reports that he is feeling good with little pain.   -    Posture/Observations    Posture/Observations Comments Patient continues to have moderate edema with decreased redness on the anterior aspect of the knee.  -    Right Knee    Extension/Flexion AROM Deficit 4-133 degrees post stretch  -      User Key  (r) = Recorded By, (t) = Taken By, (c) = Cosigned By    Initials Name Provider Type     Coy Rojas, PTA Physical Therapy Assistant     Juice Dockery, PT Physical Therapist     "                        PT Assessment/Plan       07/21/17 5350       PT Assessment    Assessment Comments Pt able to maintain reps despite complaints of increased symptoms prior to treatment today; continues to demonstrate good AAROM with 130 degrees flexion  -     PT Plan    PT Plan Comments Trial of therapy 2x/wk with pt continuing HEP daily  -       User Key  (r) = Recorded By, (t) = Taken By, (c) = Cosigned By    Initials Name Provider Type     Coy Rojas PTA Physical Therapy Assistant                Modalities       07/21/17 0900          Moist Heat    Location distal quad and post knee (R)  -      Rx Minutes 10 mins  -[facility] Prior to Rx Yes  -      Ice    Patient denies application of Ice Yes  -      Patient reports will apply ice at home to involved area Yes  -        User Key  (r) = Recorded By, (t) = Taken By, (c) = Cosigned By    Initials Name Provider Type     Coy Rojas PTA Physical Therapy Assistant                Exercises       07/21/17 0900          Subjective Comments    Subjective Comments Pt reports he woke this morning with his knee feeling more sore and painful than it has been  -  denies any change in activity yesterday  -      Subjective Pain    Able to rate subjective pain? yes  -      Pre-Treatment Pain Level 7  -      Exercise 1    Exercise Name 1 heel slides  -      Time (Minutes) 1 6  -      Exercise 3    Exercise Name 3 cycle (recumbent) seat 7  -      Time (Minutes) 3 6  -      Exercise 4    Exercise Name 4 heel raises   -      Reps 4 30  -      Exercise 5    Exercise Name 5 step ups 4\"  -      Reps 5 20   each (B)  -      Exercise 6    Exercise Name 6 mini squats  -      Reps 6 30  -      Exercise 7    Exercise Name 7 SLR  -      Reps 7 30  -      Exercise 8    Exercise Name 8 SAQ  -      Reps 8 30  -      Exercise 9    Exercise Name 9 QS towel under knee  -      Reps 9 20  -      Time (Seconds) 9 5  -      Exercise " 10    Exercise Name 10 heel prop (on large blue bolster)  -MH      Time (Minutes) 10 4  -MH      Exercise 11    Exercise Name 11 NWB gastroc stretch  -MH      Cueing 11 Verbal  -MH      Reps 11 10  -MH      Time (Seconds) 11 10  -MH        User Key  (r) = Recorded By, (t) = Taken By, (c) = Cosigned By    Initials Name Provider Type     Coy Rojas PTA Physical Therapy Assistant                        Manual Rx (last 36 hours)      Manual Treatments       07/21/17 0900          Manual Rx 1    Manual Rx 1 Location right knee  -MH      Manual Rx 1 Type PROM flexion - pt seated  -MH      Manual Rx 1 Duration 10 x 10 sec  -MH      Manual Rx 2    Manual Rx 2 Location right patella  -MH      Manual Rx 2 Type patellar mobilizations  -MH      Manual Rx 3    Manual Rx 3 Location right knee  -MH      Manual Rx 3 Type Post fem mobs  -MH      Manual Rx 3 Duration 3 x 20 osc  -MH        User Key  (r) = Recorded By, (t) = Taken By, (c) = Cosigned By    Initials Name Provider Type     Coy Rojas PTA Physical Therapy Assistant                    Therapy Education       07/21/17 1453          Therapy Education    Given HEP;Symptoms/condition management  -MH      Program Reinforced  -      How Provided Verbal  -MH      Provided to Patient  -MH      Level of Understanding Teach back education performed;Verbalized;Demonstrated  -MH        User Key  (r) = Recorded By, (t) = Taken By, (c) = Cosigned By    Initials Name Provider Type     Coy Rojas PTA Physical Therapy Assistant                Time Calculation:   Start Time: 0930  Stop Time: 1056  Time Calculation (min): 86 min    Therapy Charges for Today     Code Description Service Date Service Provider Modifiers Qty    90921770965 HC PT HOT OR COLD PACK TREAT MCARE 7/21/2017 Coy Rojas PTA GP 1    99347722338 HC PT MANUAL THERAPY EA 15 MIN 7/21/2017 Coy Rojas PTA GP 1    25596238967 HC PT THER PROC EA 15 MIN 7/21/2017 Coy Rojas PTA GP 1                     Coy Rojas, PTA  7/21/2017

## 2017-07-26 ENCOUNTER — HOSPITAL ENCOUNTER (OUTPATIENT)
Dept: PHYSICAL THERAPY | Facility: HOSPITAL | Age: 77
Setting detail: THERAPIES SERIES
Discharge: HOME | End: 2017-07-26

## 2017-07-26 DIAGNOSIS — Z96.651 STATUS POST TOTAL RIGHT KNEE REPLACEMENT: Primary | ICD-10-CM

## 2017-07-26 PROCEDURE — 97140 MANUAL THERAPY 1/> REGIONS: CPT

## 2017-07-26 PROCEDURE — 97110 THERAPEUTIC EXERCISES: CPT

## 2017-07-26 NOTE — THERAPY TREATMENT NOTE
Outpatient Physical Therapy Ortho Treatment Note   [facility]     Patient Name: Zach Gutierrez  : 1940  MRN: [id number]  Today's Date: 2017      Visit Date: 2017    Visit Dx:    ICD-10-CM ICD-9-CM   1. Status post total right knee replacement Z96.651 V43.65       Patient Active Problem List   Diagnosis   • Osteoarthritis of right knee   • Status post total right knee replacement        Past Medical History:   Diagnosis Date   • Arthritis    • Dementia    • Diabetes    • Knee sprain    • ZENA (obstructive sleep apnea)     cpap        Past Surgical History:   Procedure Laterality Date   • COLONOSCOPY N/A 2017    Procedure: COLONOSCOPY;  Surgeon: Srikanth Payan MD;  Location:  [facility];  Service:    • HERNIA REPAIR Bilateral     inguinal   • RI TOTAL KNEE ARTHROPLASTY Right 2017    Procedure: TOTAL KNEE ARTHROPLASTY hima orthoalign antibiotic cement 7fr hemovac placement;  Surgeon: Bj Thrasher MD;  Location:  [facility];  Service: Orthopedics   • SHOULDER SURGERY Right     RCR             PT Ortho       17 1400    Subjective Comments    Subjective Comments Pt with no new complaints  -    Posture/Observations    Posture/Observations Comments Mod edema still present surrounding knee but decreased into the ankle  -    Right Knee    Extension/Flexion AROM Deficit 133 degrees AAROM heel slide  -      User Key  (r) = Recorded By, (t) = Taken By, (c) = Cosigned By    Initials Name Provider Type     Coy Rojas, PTA Physical Therapy Assistant                            PT Assessment/Plan       17 1515       PT Assessment    Assessment Comments Continues with edema (R) knee but doing well with ROM; pt needing increased cues throughout session today for exercise technique  -     PT Plan    PT Plan Comments Cont to progress as pt tolerates  -       User Key  (r) = Recorded By, (t) = Taken By, (c) = Cosigned By    Initials Name Provider Type     Coy  "Jefe Rojas PTA Physical Therapy Assistant                Modalities       07/26/17 1400          Moist Heat    Location distal quad and post knee (R)  -MH      Rx Minutes 12 mins  -      MH Prior to Rx Yes  -      Ice    Patient denies application of Ice Yes  -MH      Patient reports will apply ice at home to involved area Yes  -MH        User Key  (r) = Recorded By, (t) = Taken By, (c) = Cosigned By    Initials Name Provider Type    [facility] Jefe Rojas PTA Physical Therapy Assistant                Exercises       07/26/17 1400          Subjective Comments    Subjective Comments Pt with no new complaints  -      Exercise 1    Exercise Name 1 heel slides  -MH      Time (Minutes) 1 6  -MH      Exercise 2    Exercise Name 2 cycle (recumbent) seat 7  -MH      Exercise 4    Exercise Name 4 heel raises   -MH      Reps 4 Other   40  -MH      Exercise 5    Exercise Name 5 step ups 4\"  -MH      Reps 5 20   each (B)  -MH      Additional Comments Pt needing multiple cues throughout  exercise  -MH      Exercise 6    Exercise Name 6 mini squats  -MH      Reps 6 20  -MH      Exercise 7    Exercise Name 7 SLR  -MH      Sets 7 2  -MH      Reps 7 20  -MH      Exercise 8    Exercise Name 8 SAQ  -MH      Sets 8 2  -MH      Reps 8 20  -MH      Exercise 9    Exercise Name 9 QS towel under knee  -MH      Sets 9 2  -MH      Reps 9 20  -MH      Time (Seconds) 9 5  -MH      Additional Comments Started with roll under ankle but pt kept pushing into the towel creating a hamstring set instead of QS - moved roll to knee  -MH      Exercise 10    Exercise Name 10 heel prop (on large blue bolster)  -      Time (Minutes) 10 4  -MH      Exercise 11    Exercise Name 11 Hamstring stretch with sheet  -MH      Cueing 11 Verbal  -      Reps 11 10  -MH      Time (Seconds) 11 10  -MH      Additional Comments Stayed with pt to assist with keeping count  -MH      Exercise 12    Exercise Name 12 Sidelying hip abd  -MH      Cueing 12 Verbal  -MH      " Reps 12 20  -MH        User Key  (r) = Recorded By, (t) = Taken By, (c) = Cosigned By    Initials Name Provider Type     Coy Rojas PTA Physical Therapy Assistant                        Manual Rx (last 36 hours)      Manual Treatments       07/26/17 1400          Manual Rx 1    Manual Rx 1 Location right knee  -MH      Manual Rx 1 Type PROM flexion - pt seated  -MH      Manual Rx 1 Duration 10 x 10 sec  -MH      Manual Rx 2    Manual Rx 2 Location right patella  -MH      Manual Rx 2 Type patellar mobilizations  -MH      Manual Rx 3    Manual Rx 3 Location right knee  -MH      Manual Rx 3 Type Post fem mobs  -MH      Manual Rx 3 Duration 3 x 20 osc  -MH        User Key  (r) = Recorded By, (t) = Taken By, (c) = Cosigned By    Initials Name Provider Type     Coy Rojas PTA Physical Therapy Assistant                    Therapy Education       07/26/17 1515          Therapy Education    Given HEP  -MH      Program Reinforced  -MH      How Provided Verbal;Demonstration  -MH      Provided to Patient  -MH      Level of Understanding Teach back education performed;Verbalized;Demonstrated  -MH        User Key  (r) = Recorded By, (t) = Taken By, (c) = Cosigned By    Initials Name Provider Type     Coy Rojas PTA Physical Therapy Assistant                Time Calculation:   Start Time: 1330  Stop Time: 1441  Time Calculation (min): 71 min    Therapy Charges for Today     Code Description Service Date Service Provider Modifiers Qty    66279645437 HC PT HOT OR COLD PACK TREAT MCARE 7/26/2017 Coy Rojas PTA GP 1    20235949968 HC PT MANUAL THERAPY EA 15 MIN 7/26/2017 Coy Rojas PTA GP 1    07925331311 HC PT THER PROC EA 15 MIN 7/26/2017 Coy Rojas PTA GP 2                    Coy Rojas PTA  7/26/2017

## 2017-07-28 ENCOUNTER — HOSPITAL ENCOUNTER (OUTPATIENT)
Dept: PHYSICAL THERAPY | Facility: HOSPITAL | Age: 77
Setting detail: THERAPIES SERIES
Discharge: HOME | End: 2017-07-28

## 2017-07-28 DIAGNOSIS — Z96.651 STATUS POST TOTAL RIGHT KNEE REPLACEMENT: Primary | ICD-10-CM

## 2017-07-28 PROCEDURE — 97110 THERAPEUTIC EXERCISES: CPT

## 2017-07-28 PROCEDURE — 97140 MANUAL THERAPY 1/> REGIONS: CPT

## 2017-07-28 NOTE — THERAPY TREATMENT NOTE
Outpatient Physical Therapy Ortho Treatment Note   Jovita Greene     Patient Name: Zach Gutierrez  : 1940  MRN: [id number]  Today's Date: 2017      Visit Date: 2017    Visit Dx:    ICD-10-CM ICD-9-CM   1. Status post total right knee replacement Z96.651 V43.65       Patient Active Problem List   Diagnosis   • Osteoarthritis of right knee   • Status post total right knee replacement        Past Medical History:   Diagnosis Date   • Arthritis    • Dementia    • Diabetes    • Knee sprain    • ZENA (obstructive sleep apnea)     cpap        Past Surgical History:   Procedure Laterality Date   • COLONOSCOPY N/A 2017    Procedure: COLONOSCOPY;  Surgeon: Srikanth Payan MD;  Location: [facility] OR;  Service:    • HERNIA REPAIR Bilateral     inguinal   • [place] TOTAL KNEE ARTHROPLASTY Right 2017    Procedure: TOTAL KNEE ARTHROPLASTY hima orthoalign antibiotic cement 7fr hemovac placement;  Surgeon: Bj Thrasher MD;  Location: [facility] OR;  Service: Orthopedics   • SHOULDER SURGERY Right     RCR             PT Ortho       17 1700    Subjective Comments    Subjective Comments Pt reports doing well today; pt's wife questioning if pt should be doing ex's at home  -    Right Knee    Extension/Flexion AROM Deficit 134 degrees heel slide  -      17 1400    Subjective Comments    Subjective Comments Pt with no new complaints  -    Posture/Observations    Posture/Observations Comments Mod edema still present surrounding knee but decreased into the ankle  -    Right Knee    Extension/Flexion AROM Deficit 133 degrees AAROM heel slide  -      User Key  (r) = Recorded By, (t) = Taken By, (c) = Cosigned By    Initials Name Provider Type     Coy Rojas PTA Physical Therapy Assistant                            PT Assessment/Plan       17 1716       PT Assessment    Assessment Comments Pt requires cues throughout session for ex's; continues to progress  well with ROM  "and overall increased functional mobility  -     PT Plan    PT Plan Comments Cont per POC and progress strengthening as pt tolerates  -       User Key  (r) = Recorded By, (t) = Taken By, (c) = Cosigned By    Initials Name Provider Type     Coy Rojas PTA Physical Therapy Assistant                Modalities       07/28/17 1700          Moist Heat    Location ant/post (R) knee - pt supine  -MH      Rx Minutes 12 mins  -MH      MH Prior to Rx Yes  -MH        User Key  (r) = Recorded By, (t) = Taken By, (c) = Cosigned By    Initials Name Provider Type     Coy Rojas PTA Physical Therapy Assistant                Exercises       07/28/17 1700          Subjective Comments    Subjective Comments Pt reports doing well today; pt's wife questioning if pt should be doing ex's at home  -      Exercise 1    Exercise Name 1 heel slides  -MH      Time (Minutes) 1 6  -MH      Exercise 2    Exercise Name 2 cycle (recumbent) seat 7  -MH      Time (Minutes) 2 7  -MH      Exercise 4    Exercise Name 4 heel raises   -      Reps 4 Other   40  -MH      Exercise 5    Exercise Name 5 step ups 4\"  -MH      Reps 5 20   each (B)  -MH      Exercise 6    Exercise Name 6 mini squats  -      Reps 6 20  -MH      Exercise 7    Exercise Name 7 SLR  -MH      Sets 7 2  -MH      Reps 7 20  -MH      Exercise 8    Exercise Name 8 SAQ  -MH      Sets 8 2  -MH      Reps 8 20  -MH      Exercise 9    Exercise Name 9 QS towel under knee  -MH      Sets 9 2  -MH      Reps 9 20  -MH      Time (Seconds) 9 5  -MH      Exercise 10    Exercise Name 10 heel prop (on large blue bolster)  -      Exercise 11    Exercise Name 11 Hamstring stretch with sheet  -MH      Cueing 11 Verbal  -      Reps 11 10  -MH      Time (Seconds) 11 10  -MH      Exercise 12    Exercise Name 12 Sidelying hip abd  -MH      Cueing 12 Verbal  -MH      Sets 12 2  -MH      Reps 12 20  -MH        User Key  (r) = Recorded By, (t) = Taken By, (c) = Cosigned By    Initials " Name Provider Type     Coy Rojas PTA Physical Therapy Assistant                        Manual Rx (last 36 hours)      Manual Treatments       07/28/17 1700          Manual Rx 1    Manual Rx 1 Location (R) knee  -MH      Manual Rx 1 Type PROM flexion - pt seated  -MH      Manual Rx 1 Duration 10 x 10 sec  -MH      Manual Rx 2    Manual Rx 2 Location (R) knee  -MH      Manual Rx 2 Type post tib mobs  -MH      Manual Rx 2 Duration throughout PROM flexion  -MH        User Key  (r) = Recorded By, (t) = Taken By, (c) = Cosigned By    Initials Name Provider Type     Coy Rojas PTA Physical Therapy Assistant                    Therapy Education       07/28/17 1714          Therapy Education    Education Details Pt encouraged (with wife present) to perform HEP and to continue using elevation and CP's PRN as long as he is still having edema  -MH      Given HEP  -MH      Program Reinforced  -MH      How Provided Verbal  -MH      Provided to Patient;Caregiver  -MH      Level of Understanding Teach back education performed;Verbalized;Demonstrated  -MH        User Key  (r) = Recorded By, (t) = Taken By, (c) = Cosigned By    Initials Name Provider Type     Coy Rojas PTA Physical Therapy Assistant                Time Calculation:   Start Time: 1445  Stop Time: 1630  Time Calculation (min): 105 min    Therapy Charges for Today     Code Description Service Date Service Provider Modifiers Qty    49807825303 HC PT HOT OR COLD PACK TREAT MCARE 7/28/2017 Coy Rojas PTA GP 1    52035789179 HC PT MANUAL THERAPY EA 15 MIN 7/28/2017 Coy Rojas PTA GP 1    82648731176 HC PT THER PROC EA 15 MIN 7/28/2017 Coy Rojas PTA GP 1                    Coy Rojas PTA  7/28/2017

## 2017-08-01 ENCOUNTER — HOSPITAL ENCOUNTER (OUTPATIENT)
Dept: PHYSICAL THERAPY | Facility: HOSPITAL | Age: 77
Setting detail: THERAPIES SERIES
Discharge: HOME | End: 2017-08-01

## 2017-08-01 DIAGNOSIS — Z96.651 STATUS POST TOTAL RIGHT KNEE REPLACEMENT: Primary | ICD-10-CM

## 2017-08-01 PROCEDURE — 97110 THERAPEUTIC EXERCISES: CPT

## 2017-08-01 NOTE — THERAPY TREATMENT NOTE
Outpatient Physical Therapy Ortho Treatment Note   Jovita Greene     Patient Name: Zach Gutierrez  : 1940  MRN: [id number]  Today's Date: 2017      Visit Date: 2017    Visit Dx:    ICD-10-CM ICD-9-CM   1. Status post total right knee replacement Z96.651 V43.65       Patient Active Problem List   Diagnosis   • Osteoarthritis of right knee   • Status post total right knee replacement        Past Medical History:   Diagnosis Date   • Arthritis    • Dementia    • Diabetes    • Knee sprain    • ZENA (obstructive sleep apnea)     cpap        Past Surgical History:   Procedure Laterality Date   • COLONOSCOPY N/A 2017    Procedure: COLONOSCOPY;  Surgeon: Srikanth Payan MD;  Location:  [facility] OR;  Service:    • HERNIA REPAIR Bilateral     inguinal   • [place] TOTAL KNEE ARTHROPLASTY Right 2017    Procedure: TOTAL KNEE ARTHROPLASTY hima orthoalign antibiotic cement 7fr hemovac placement;  Surgeon: Bj Thrasher MD;  Location:  [facility] OR;  Service: Orthopedics   • SHOULDER SURGERY Right     RCR             PT Ortho       17 1400    Subjective Comments    Subjective Comments Pt reports his knee feels pretty good today  -    Subjective Pain    Able to rate subjective pain? yes  -    Pre-Treatment Pain Level 0  -    Right Knee    Extension/Flexion AROM Deficit 0 - 136 (quad set to heel slide with sheet)  -    Gait Assessment/Treatment    Gait, Comment Pt ambulates into clinic with SPC in (R) hand.  Instructed pt in use of cane in (L) hand.  Pt expressed confusion for why cane would be in opposite hand of TKA - explanation and demonstration given  -      User Key  (r) = Recorded By, (t) = Taken By, (c) = Cosigned By    Initials Name Provider Type     Coy Rojas, PTA Physical Therapy Assistant                            PT Assessment/Plan       17 1613       PT Assessment    Assessment Comments Pt needing cues for correct use of SPC; cues needed with ex's,  "especially with sequencing of fwd step ups; good tolerance to progression of resistance and continues with good A/AROM  -     PT Plan    PT Plan Comments Cont to progress strengthening and education  -       User Key  (r) = Recorded By, (t) = Taken By, (c) = Cosigned By    Initials Name Provider Type     Coy RojasALANA Physical Therapy Assistant                Modalities       08/01/17 1400          Moist Heat    Location ant/post (R knee - pt supine  -      Rx Minutes 12 mins  -[facility] Prior to Rx Yes  -        User Key  (r) = Recorded By, (t) = Taken By, (c) = Cosigned By    Initials Name Provider Type     Coy RojasALANA Physical Therapy Assistant                Exercises       08/01/17 1400          Subjective Comments    Subjective Comments Pt reports his knee feels pretty good today  -      Subjective Pain    Able to rate subjective pain? yes  -      Pre-Treatment Pain Level 0  -      Exercise 1    Exercise Name 1 heel slides  -      Reps 1 10  -      Exercise 2    Exercise Name 2 cycle (recumbent) seat 6  -      Time (Minutes) 2 10  -      Exercise 4    Exercise Name 4 heel raises   -      Reps 4 Other   40  -      Exercise 5    Exercise Name 5 6\" step ups   -      Reps 5 20   each (B)  -      Exercise 6    Exercise Name 6 mini squats  -      Reps 6 20  -      Exercise 7    Exercise Name 7 SLR  -      Sets 7 --  -      Reps 7 25  -      Additional Comments 1#  -      Exercise 8    Exercise Name 8 SAQ  -      Sets 8 2  -      Reps 8 20  -      Additional Comments 2#  -      Exercise 9    Exercise Name 9 QS towel under knee  -      Sets 9 --  -      Reps 9 --  -      Time (Seconds) 9 --  -      Exercise 10    Exercise Name 10 --  -      Exercise 11    Exercise Name 11 Hamstring stretch with sheet  -      Cueing 11 Verbal  -      Reps 11 10  -      Time (Seconds) 11 10  -      Exercise 12    Exercise Name 12 Sidelying hip abd  " -MH      Cueing 12 Verbal  -MH      Sets 12 --  -MH      Reps 12 25  -MH      Additional Comments 1#  -MH        User Key  (r) = Recorded By, (t) = Taken By, (c) = Cosigned By    Initials Name Provider Type    JANETH Rojas PTA Physical Therapy Assistant                                   Therapy Education       08/01/17 1611          Therapy Education    Education Details Education and practice with using SPC in correct hand; pt struggled with correct sequencing  -MH      Given Mobility training;HEP  -MH      Program Reinforced  -MH      How Provided Verbal;Demonstration  -MH      Provided to Patient  -MH      Level of Understanding Teach back education performed;Verbalized;Demonstrated  -MH        User Key  (r) = Recorded By, (t) = Taken By, (c) = Cosigned By    Initials Name Provider Type    JANETH Rojas PTA Physical Therapy Assistant                Time Calculation:   Start Time: 1437  Stop Time: 1556  Time Calculation (min): 79 min    Therapy Charges for Today     Code Description Service Date Service Provider Modifiers Qty    61939790462 HC PT HOT OR COLD PACK TREAT [facility]ARE 8/1/2017 Coy Rojas PTA GP 1    51048004159 HC PT THER PROC EA 15 MIN 8/1/2017 Coy Rojas PTA GP 3                    Coy Rojas PTA  8/1/2017

## 2017-08-03 ENCOUNTER — HOSPITAL ENCOUNTER (OUTPATIENT)
Dept: PHYSICAL THERAPY | Facility: HOSPITAL | Age: 77
Setting detail: THERAPIES SERIES
Discharge: HOME | End: 2017-08-03

## 2017-08-03 DIAGNOSIS — Z96.651 STATUS POST TOTAL RIGHT KNEE REPLACEMENT: Primary | ICD-10-CM

## 2017-08-03 PROCEDURE — 97110 THERAPEUTIC EXERCISES: CPT | Performed by: PHYSICAL THERAPIST

## 2017-08-03 NOTE — THERAPY TREATMENT NOTE
Outpatient Physical Therapy Ortho Re-Evaluation   Jovita Greene     Patient Name: Zach Gutierrez  : 1940  MRN: [id number]  Today's Date: 8/3/2017      Visit Date: 2017    Patient Active Problem List   Diagnosis   • Osteoarthritis of right knee   • Status post total right knee replacement        Past Medical History:   Diagnosis Date   • Arthritis    • Dementia    • Diabetes    • Knee sprain    • ZENA (obstructive sleep apnea)     cpap        Past Surgical History:   Procedure Laterality Date   • COLONOSCOPY N/A 2017    Procedure: COLONOSCOPY;  Surgeon: Srikanth Payan MD;  Location: [facility] OR;  Service:    • HERNIA REPAIR Bilateral     inguinal   • IN TOTAL KNEE ARTHROPLASTY Right 2017    Procedure: TOTAL KNEE ARTHROPLASTY hima orthoalign antibiotic cement 7fr hemovac placement;  Surgeon: Bj Thrasher MD;  Location: [facility] OR;  Service: Orthopedics   • SHOULDER SURGERY Right     RCR       Visit Dx:     ICD-10-CM ICD-9-CM   1. Status post total right knee replacement Z96.651 V43.65                 PT Ortho       17 1300    Subjective Comments    Subjective Comments Patient reports that he is doing better.  He reports he is consistently ambulating in his home without assistive device but continues to use the cane for community ambulation.  Patient reports that he has pain increased pain with exercises and prolonged weight bearing.    -SP    Subjective Pain    Able to rate subjective pain? yes  -SP    Pre-Treatment Pain Level 0  -SP    Posture/Observations    Observations Edema;Incision healing;Muscle atrophy  -SP    Posture/Observations Comments edema still present surrounding knee but decreased into the ankle  -SP    Right Knee    Extension/Flexion AROM Deficit 3 to 131 degrees  -SP    Extension/Flexion PROM Deficit 2 to 134 degrees  -SP    Right Hip    Hip Flexion Gross Movement (4-/5) good minus  -SP    Hip ABduction Gross Movement (4-/5) good minus  -SP    Hip  ADduction Gross Movement (4-/5) good minus  -SP    Right Knee    Knee Extension Gross Movement (3/5) fair  -SP    Knee Flexion Gross Movement (3/5) fair  -SP    Right Ankle/Foot    Ankle PF Gross Movement (4/5) good  -SP    Ankle Dorsiflexion Gross Movement (4/5) good  -SP    Transfers    Transfers, Sit-Stand [place] independent   uses UEs to assist  -SP    Gait Assessment/Treatment    Gait, [place] Level independent  -SP    Gait, Assistive Device straight cane  -SP    Gait, Gait Pattern Analysis swing-through gait  -    Gait, Gait Deviations alayna decreased;decreased heel strike;forward flexed posture;step length decreased  -    Gait, Comment Patient continues to ambulate with SPC in (R) hand despite repeated instruction in appropriate use.    -SP    Stairs Assessment/Treatment    Stairs, [place] Level independent  -SP    Stairs, Assistive Device straight cane  -SP    Stairs, Technique Used step to step (ascending);step to step (descending)  -      08/01/17 1400    Subjective Comments    Subjective Comments Pt reports his knee feels pretty good today  -    Subjective Pain    Able to rate subjective pain? yes  -    Pre-Treatment Pain Level 0  -    Right Knee    Extension/Flexion AROM Deficit 0 - 136 (quad set to heel slide with sheet)  -    Gait Assessment/Treatment    Gait, Comment Pt ambulates into clinic with SPC in (R) hand.  Instructed pt in use of cane in (L) hand.  Pt expressed confusion for why cane would be in opposite hand of TKA - explanation and demonstration given  -      User Key  (r) = Recorded By, (t) = Taken By, (c) = Cosigned By    Initials Name Provider Type     Coy Rojas, PTA Physical Therapy Assistant    DARRELL Prescott, PT Physical Therapist                                PT OP Goals       08/03/17 1300       PT Short Term Goals    STG 1 1.  Patient demonstrates AROM right knee extension to 0 degrees to normalize gait  -     STG 1 Progress  Progressing  -SP     STG 2 2.  Patient ambulates level surfaces with least restrictive device with equal weight bearing and heel strike bilaterally  -SP     STG 2 Progress Partially Met  -SP     STG 3 3.  Patient exhibits AROM right knee to 120 degrees to aid in functional mobility  -SP     STG 3 Progress Met  -SP     Long Term Goals    LTG 1 1.  Patient demonstrates increased strength right LE by one muscle grade  -SP     LTG 1 Progress Partially Met  -SP     LTG 2 2.  Patient ambulates stairs and unlevel surfaces safely with least restrictive device  -SP     LTG 2 Progress Ongoing  -SP     LTG 3 3.  Patient independent with HEP  -SP     LTG 3 Progress Ongoing  -SP       User Key  (r) = Recorded By, (t) = Taken By, (c) = Cosigned By    Initials Name Provider Type    DARRELL Prescott, PT Physical Therapist                PT Assessment/Plan       08/03/17 1608       PT Assessment    Assessment Comments Patient has met or made progress toward all goals.   He exhibits good right knee flexion, however he has lost some ROM into right knee extension.  Patient exhibits improvement in right LE strength but continued overall LE weakness.    -SP     PT Plan    PT Plan Comments Continue to progress gait, ROM and strength for 2-3 weeks then d/c to HEP  -SP       User Key  (r) = Recorded By, (t) = Taken By, (c) = Cosigned By    Initials Name Provider Type    DARRELL Prescott, PT Physical Therapist                Modalities       08/03/17 1300          Moist Heat    MH Applied Yes  -SP      Location ant/post (R knee - pt supine  -SP      Rx Minutes 12 mins  -SP      MH Prior to Rx Yes  -SP        User Key  (r) = Recorded By, (t) = Taken By, (c) = Cosigned By    Initials Name Provider Type    DARRELL Prescott, PT Physical Therapist              Exercises       08/03/17 1300          Subjective Comments    Subjective Comments Patient reports that he is doing better.  He reports he is consistently  "ambulating in his home without assistive device but continues to use the cane for community ambulation.  Patient reports that he has pain increased pain with exercises and prolonged weight bearing.    -SP      Subjective Pain    Able to rate subjective pain? yes  -SP      Pre-Treatment Pain Level 0  -SP      Exercise 1    Exercise Name 1 heel slides  -SP      Reps 1 10  -SP      Exercise 2    Exercise Name 2 cycle (recumbent) seat 6  -SP      Time (Minutes) 2 10  -SP      Exercise 4    Exercise Name 4 heel raises   -SP      Reps 4 Other   40  -SP      Exercise 5    Exercise Name 5 6\" step ups   -SP      Reps 5 20   each (B)  -SP      Exercise 6    Exercise Name 6 mini squats  -SP      Reps 6 20  -SP      Exercise 7    Exercise Name 7 SLR  -SP      Reps 7 25  -SP      Exercise 8    Exercise Name 8 SAQ  -SP      Sets 8 2  -SP      Reps 8 20  -SP      Exercise 9    Exercise Name 9 QS towel under knee  -SP      Exercise 11    Exercise Name 11 Hamstring stretch with sheet  -SP      Cueing 11 Verbal  -SP      Reps 11 10  -SP      Time (Seconds) 11 10  -SP      Exercise 12    Exercise Name 12 Sidelying hip abd  -SP      Cueing 12 Verbal  -SP      Reps 12 25  -SP        User Key  (r) = Recorded By, (t) = Taken By, (c) = Cosigned By    Initials Name Provider Type    DARRELL Prescott, PT Physical Therapist           Manual Rx (last 36 hours)      Manual Treatments       08/03/17 1300          Manual Rx 1    Manual Rx 1 Location (R) knee  -SP      Manual Rx 1 Type PROM flexion - pt seated  -SP      Manual Rx 1 Duration 10 x 10 sec  -SP      Manual Rx 2    Manual Rx 2 Location (R) knee  -SP      Manual Rx 2 Type post tib mobs  -SP      Manual Rx 2 Duration throughout PROM flexion  -SP        User Key  (r) = Recorded By, (t) = Taken By, (c) = Cosigned By    Initials Name Provider Type    SP Lana Prescott, PT Physical Therapist                            Time Calculation:   Start Time: 1300  Stop Time: " 1415  Time Calculation (min): 75 min     Therapy Charges for Today     Code Description Service Date Service Provider Modifiers Qty    40625905209 HC PT HOT OR COLD PACK TREAT [facility] 8/3/2017 Lana Prescott, PT GP 1    24981420321 HC PT THER PROC EA 15 MIN 8/3/2017 Lana Prescott, PT GP 1                    Lana Prescott, PT  8/3/2017

## 2017-08-07 ENCOUNTER — HOSPITAL ENCOUNTER (OUTPATIENT)
Dept: PHYSICAL THERAPY | Facility: HOSPITAL | Age: 77
Setting detail: THERAPIES SERIES
Discharge: HOME | End: 2017-08-07

## 2017-08-07 PROCEDURE — 97110 THERAPEUTIC EXERCISES: CPT | Performed by: PHYSICAL THERAPIST

## 2017-08-07 NOTE — THERAPY TREATMENT NOTE
Outpatient Physical Therapy Ortho Treatment Note   Jovita Greene     Patient Name: Zach Gutierrez  : 1940  MRN: [id number]  Today's Date: 2017      Visit Date: 2017    Visit Dx:  No diagnosis found.    Patient Active Problem List   Diagnosis   • Osteoarthritis of right knee   • Status post total right knee replacement        Past Medical History:   Diagnosis Date   • Arthritis    • Dementia    • Diabetes    • Knee sprain    • ZENA (obstructive sleep apnea)     cpap        Past Surgical History:   Procedure Laterality Date   • COLONOSCOPY N/A 2017    Procedure: COLONOSCOPY;  Surgeon: Srikanth Payan MD;  Location: [facility] OR;  Service:    • HERNIA REPAIR Bilateral     inguinal   • [place] TOTAL KNEE ARTHROPLASTY Right 2017    Procedure: TOTAL KNEE ARTHROPLASTY hima orthoalign antibiotic cement 7fr hemovac placement;  Surgeon: Bj Thrasher MD;  Location:  [facility] OR;  Service: Orthopedics   • SHOULDER SURGERY Right     RCR             PT Ortho       17 1400    Subjective Comments    Subjective Comments Patient reports that he did yard work over the weekend and mowed his grass.  States that he has not had a lot of pain but gets fatiqued easily  -SP      User Key  (r) = Recorded By, (t) = Taken By, (c) = Cosigned By    Initials Name Provider Type    DARRELL Prescott, PT Physical Therapist                            PT Assessment/Plan       17 1659       PT Assessment    Assessment Comments Patient demonstrates much improved gait pattern on level suface without cane.  He continues to use SPC for community ambulation.  -SP     PT Plan    PT Plan Comments Continue to progress as ROM and strengthening exercises  -SP       User Key  (r) = Recorded By, (t) = Taken By, (c) = Cosigned By    Initials Name Provider Type    DARRELL Prescott, PT Physical Therapist                Modalities       17 1400          Moist Heat    MH Applied Yes  -SP       "Location ant/post (R knee - pt supine  -SP      Rx Minutes 12 mins  -SP      MH Prior to Rx Yes  -SP        User Key  (r) = Recorded By, (t) = Taken By, (c) = Cosigned By    Initials Name Provider Type    SP Lana Prescott PT Physical Therapist                Exercises       08/07/17 1400          Subjective Comments    Subjective Comments Patient reports that he did yard work over the weekend and mowed his grass.  States that he has not had a lot of pain but gets fatiqued easily  -SP      Exercise 1    Exercise Name 1 heel slides  -SP      Reps 1 10  -SP      Exercise 2    Exercise Name 2 cycle (recumbent) seat 6  -SP      Time (Minutes) 2 10  -SP      Exercise 4    Exercise Name 4 heel raises   -SP      Reps 4 Other   40  -SP      Exercise 5    Exercise Name 5 6\" step ups   -SP      Reps 5 20   each (B)  -SP      Exercise 6    Exercise Name 6 mini squats  -SP      Reps 6 20  -SP      Exercise 7    Exercise Name 7 SLR  -SP      Reps 7 25  -SP      Exercise 8    Exercise Name 8 SAQ  -SP      Sets 8 2  -SP      Reps 8 20  -SP      Exercise 9    Exercise Name 9 QS towel under knee  -SP      Exercise 11    Exercise Name 11 Hamstring stretch with sheet  -SP      Cueing 11 Verbal  -SP      Reps 11 10  -SP      Time (Seconds) 11 10  -SP      Exercise 12    Exercise Name 12 Sidelying hip abd  -SP      Cueing 12 Verbal  -SP      Reps 12 25  -SP        User Key  (r) = Recorded By, (t) = Taken By, (c) = Cosigned By    Initials Name Provider Type    DARRELL Prescott PT Physical Therapist                                       Time Calculation:   Start Time: 1400  Stop Time: 1515  Time Calculation (min): 75 min    Therapy Charges for Today     Code Description Service Date Service Provider Modifiers Qty    93437968340 HC PT HOT OR COLD PACK TREAT [facility] 8/7/2017 Lana Prescott, PT GP 1    55730956768 HC PT THER PROC EA 15 MIN 8/7/2017 Lana Prescott, PT GP 1                    Lana Borges " [place], PT  8/7/2017

## 2017-08-11 ENCOUNTER — HOSPITAL ENCOUNTER (OUTPATIENT)
Dept: PHYSICAL THERAPY | Facility: HOSPITAL | Age: 77
Setting detail: THERAPIES SERIES
Discharge: HOME | End: 2017-08-11

## 2017-08-11 DIAGNOSIS — Z96.651 STATUS POST TOTAL RIGHT KNEE REPLACEMENT: Primary | ICD-10-CM

## 2017-08-11 PROCEDURE — 97140 MANUAL THERAPY 1/> REGIONS: CPT

## 2017-08-11 PROCEDURE — 97110 THERAPEUTIC EXERCISES: CPT

## 2017-08-11 NOTE — THERAPY TREATMENT NOTE
Outpatient Physical Therapy Ortho Treatment Note   Jovita Greene     Patient Name: Zach Gutierrez  : 1940  MRN: [id number]  Today's Date: 2017      Visit Date: 2017    Visit Dx:    ICD-10-CM ICD-9-CM   1. Status post total right knee replacement Z96.651 V43.65       Patient Active Problem List   Diagnosis   • Osteoarthritis of right knee   • Status post total right knee replacement        Past Medical History:   Diagnosis Date   • Arthritis    • Dementia    • Diabetes    • Knee sprain    • ZENA (obstructive sleep apnea)     cpap        Past Surgical History:   Procedure Laterality Date   • COLONOSCOPY N/A 2017    Procedure: COLONOSCOPY;  Surgeon: Srikanth Payan MD;  Location:  [facility];  Service:    • HERNIA REPAIR Bilateral     inguinal   • NV TOTAL KNEE ARTHROPLASTY Right 2017    Procedure: TOTAL KNEE ARTHROPLASTY hima orthoalign antibiotic cement 7fr hemovac placement;  Surgeon: Bj Thrasher MD;  Location:  [facility];  Service: Orthopedics   • SHOULDER SURGERY Right     RCR             PT Ortho       17 1300    Subjective Comments    Subjective Comments Pt reports his knee continues to be tender post knee especially when knee is extended for QS  -MH      User Key  (r) = Recorded By, (t) = Taken By, (c) = Cosigned By    Initials Name Provider Type     Coy Rojas PTA Physical Therapy Assistant                            PT Assessment/Plan       17 1617       PT Assessment    Assessment Comments Pt ambulates into clinic with no AD and little to no notable antalgic gait; continues to have most discomfort posterior knee  -     PT Plan    PT Plan Comments Pt to MD after next appt  -MH       User Key  (r) = Recorded By, (t) = Taken By, (c) = Cosigned By    Initials Name Provider Type     Coy Rojas PTA Physical Therapy Assistant                Modalities       17 1300          Moist Heat    Location ant/post (R) knee - pt supine  -       "Rx Minutes 12 mins  -MH      MH Prior to Rx Yes  -MH        User Key  (r) = Recorded By, (t) = Taken By, (c) = Cosigned By    Initials Name Provider Type     Coy Rojas PTA Physical Therapy Assistant                Exercises       08/11/17 1300          Subjective Comments    Subjective Comments Pt reports his knee continues to be tender post knee especially when knee is extended for QS  -MH      Exercise 1    Exercise Name 1 heel slides  -MH      Reps 1 --  -MH      Exercise 2    Exercise Name 2 cycle (recumbent) seat 6  -MH      Time (Minutes) 2 10  -MH      Exercise 4    Exercise Name 4 heel raises   -MH      Reps 4 Other   40  -MH      Exercise 5    Exercise Name 5 6\" step ups   -MH      Reps 5 20   each (B)  -MH      Exercise 6    Exercise Name 6 mini squats  -MH      Sets 6 2  -MH      Reps 6 20  -MH      Exercise 7    Exercise Name 7 SLR  -MH      Sets 7 2  -MH      Reps 7 20  -MH      Exercise 8    Exercise Name 8 SAQ  -MH      Sets 8 2  -MH      Reps 8 20  -MH      Additional Comments 2#  -MH      Exercise 9    Exercise Name 9 QS towel under knee  -MH      Exercise 10    Exercise Name 10 NWB gastroc stretch  -MH      Cueing 10 Verbal  -MH      Reps 10 3  -MH      Time (Seconds) 10 20  -MH      Exercise 11    Exercise Name 11 Hamstring stretch with sheet  -MH      Cueing 11 Verbal  -MH      Reps 11 10  -MH      Time (Seconds) 11 10  -MH      Exercise 12    Exercise Name 12 Sidelying hip abd  -MH      Cueing 12 Verbal  -MH      Sets 12 2  -MH      Reps 12 20  -MH      Exercise 13    Exercise Name 13 Supine ext stretch  -MH      Time (Minutes) 13 5  -MH        User Key  (r) = Recorded By, (t) = Taken By, (c) = Cosigned By    Initials Name Provider Type     Coy Rojas PTA Physical Therapy Assistant                        Manual Rx (last 36 hours)      Manual Treatments       08/11/17 1300          Manual Rx 1    Manual Rx 1 Location (R) knee  -      Manual Rx 1 Type PROM flexion - pt seated  - "      Manual Rx 1 Duration 10 x 10 sec  -MH      Manual Rx 2    Manual Rx 2 Location (R) knee  -MH      Manual Rx 2 Type post tib mobs  -MH      Manual Rx 2 Duration throughout PROM flexion  -MH      Manual Rx 3    Manual Rx 3 Location (R) knee  -MH      Manual Rx 3 Type post fem mobs  -MH      Manual Rx 3 Duration 3 x 20 osc  -MH        User Key  (r) = Recorded By, (t) = Taken By, (c) = Cosigned By    Initials Name Provider Type     Coy Rojas PTA Physical Therapy Assistant                    Therapy Education       08/11/17 1607          Therapy Education    Given HEP  -MH      Program Reinforced  -MH      How Provided Verbal  -MH      Provided to Patient  -MH      Level of Understanding Teach back education performed;Demonstrated;Verbalized  -MH        User Key  (r) = Recorded By, (t) = Taken By, (c) = Cosigned By    Initials Name Provider Type     Coy Rojas PTA Physical Therapy Assistant                Time Calculation:   Start Time: 1300  Stop Time: 1434  Time Calculation (min): 94 min    Therapy Charges for Today     Code Description Service Date Service Provider Modifiers Qty    88690041757 HC PT HOT OR COLD PACK TREAT MCARE 8/11/2017 Coy Rojas PTA GP 1    74073813835 HC PT MANUAL THERAPY EA 15 MIN 8/11/2017 Coy Rojas PTA GP 1    49903428469 HC PT THER PROC EA 15 MIN 8/11/2017 Coy Rojas PTA GP 2                    Coy Rojas PTA  8/11/2017

## 2017-08-14 RX ORDER — PANTOPRAZOLE SODIUM 40 MG/1
TABLET, DELAYED RELEASE ORAL
Qty: 30 TABLET | Refills: 0 | Status: SHIPPED | OUTPATIENT
Start: 2017-08-14 | End: 2017-09-02 | Stop reason: SDUPTHER

## 2017-08-16 ENCOUNTER — APPOINTMENT (OUTPATIENT)
Dept: PHYSICAL THERAPY | Facility: HOSPITAL | Age: 77
End: 2017-08-16

## 2017-08-16 ENCOUNTER — OFFICE VISIT (OUTPATIENT)
Dept: ORTHOPEDIC SURGERY | Facility: CLINIC | Age: 77
End: 2017-08-16

## 2017-08-16 ENCOUNTER — DOCUMENTATION (OUTPATIENT)
Dept: PHYSICAL THERAPY | Facility: HOSPITAL | Age: 77
End: 2017-08-16

## 2017-08-16 DIAGNOSIS — Z96.651 STATUS POST TOTAL RIGHT KNEE REPLACEMENT: Primary | ICD-10-CM

## 2017-08-16 PROCEDURE — 99024 POSTOP FOLLOW-UP VISIT: CPT | Performed by: ORTHOPAEDIC SURGERY

## 2017-08-16 NOTE — THERAPY TREATMENT NOTE
**MISSED VISIT NOTE**    Pt did not call to cancel or attend today's therapy session.  Pt is scheduled to follow up with Dr. Thrasher this afternoon.

## 2017-08-16 NOTE — PROGRESS NOTES
Subjective: Status post right total knee     Patient ID: Zach Gutierrez is a 76 y.o. male.    Chief Complaint:    History of Present Illness patient is 2 months status post a right total knee continues to well ambulating independently with resolution of his preoperative pain and discomfort.  The patient postop urinary incontinence has resolved with change of medication by his urologist.  Again is always preoperative pain has resolved and now is easily managed and controlled with Tylenol.  States he has some soreness in the knee today as he walked 2-3 miles yesterday       Social History     Occupational History   • Not on file.     Social History Main Topics   • Smoking status: Former Smoker   • Smokeless tobacco: Never Used   • Alcohol use Yes      Comment: one jim/week   • Drug use: No   • Sexual activity: Defer      Review of Systems   Constitutional: Negative for chills, diaphoresis, fever and unexpected weight change.   HENT: Negative for hearing loss, nosebleeds, sore throat and tinnitus.    Eyes: Negative for pain and visual disturbance.   Respiratory: Negative for cough, shortness of breath and wheezing.    Cardiovascular: Negative for chest pain and palpitations.   Gastrointestinal: Negative for abdominal pain, diarrhea, nausea and vomiting.   Endocrine: Negative for cold intolerance, heat intolerance and polydipsia.   Genitourinary: Negative for difficulty urinating, dysuria and hematuria.   Musculoskeletal: Negative for arthralgias, joint swelling and myalgias.   Skin: Negative for rash and wound.   Allergic/Immunologic: Negative for environmental allergies.   Neurological: Negative for dizziness, syncope and numbness.   Hematological: Does not bruise/bleed easily.   Psychiatric/Behavioral: Negative for dysphoric mood and sleep disturbance. The patient is not nervous/anxious.          Past Medical History:   Diagnosis Date   • Arthritis    • Dementia    • Diabetes    • Knee sprain    • ZENA  (obstructive sleep apnea)     cpap     Past Surgical History:   Procedure Laterality Date   • COLONOSCOPY N/A 5/19/2017    Procedure: COLONOSCOPY;  Surgeon: Srikanth Payan MD;  Location: [facility];  Service:    • HERNIA REPAIR Bilateral     inguinal   • NC TOTAL KNEE ARTHROPLASTY Right 6/20/2017    Procedure: TOTAL KNEE ARTHROPLASTY hima orthoalign antibiotic cement 7fr hemovac placement;  Surgeon: Bj Thrasher MD;  Location:  [facility] OR;  Service: Orthopedics   • SHOULDER SURGERY Right 2007    RCR     No family history on file.      Objective:  There were no vitals filed for this visit.  There were no vitals filed for this visit.  There is no height or weight on file to calculate BMI.       Ortho Exam  he is alert and oriented ×3.  The right knee shows no swelling or effusion.  Slight swelling to the leg but the calf is nontender negative Homans.  The skin is cool to touch.  His no sensory loss.  Distal motor function is intact good capillary refill.  0-125° of motion with no instability throughout the arc of motion.  He has tolerated ibuprofen when needed but now the pain is controlled with Tylenol.      Assessment :       1. Status post total right knee replacement          Plan:      Return to see me September 7 prior to a  trip they have plan just to be sure that he is not having any problems.  No x-rays necessary on return.  Today he's had a neck source response and a result of that surgery      Work Status:    LATRELL query complete.    Orders:  No orders of the defined types were placed in this encounter.      Medications:  No orders of the defined types were placed in this encounter.      Followup:  Return in about 22 days (around 9/7/2017).          Dragon transcription disclaimer     Much of this encounter note is an electronic transcription/translation of spoken language to printed text. The electronic translation of spoken language may permit erroneous, or at times, nonsensical  words or phrases to be inadvertently transcribed. Although I have reviewed the note for such errors, some may still exist.

## 2017-08-18 ENCOUNTER — APPOINTMENT (OUTPATIENT)
Dept: PHYSICAL THERAPY | Facility: HOSPITAL | Age: 77
End: 2017-08-18

## 2017-08-22 ENCOUNTER — DOCUMENTATION (OUTPATIENT)
Dept: PHYSICAL THERAPY | Facility: HOSPITAL | Age: 77
End: 2017-08-22

## 2017-08-22 NOTE — THERAPY DISCHARGE NOTE
Outpatient Physical Therapy Discharge Summary         Patient Name: Zach Gutierrez  : 1940  MRN: [id number]    Today's Date: 2017    Visit Dx:  No diagnosis found.          PT OP Goals       17 0900       PT Short Term Goals    STG 1 1.  Patient demonstrates AROM right knee extension to 0 degrees to normalize gait  -     STG 1 Progress Partially Met  -     STG 1 Progress Comments Goal met but not consistently  -     STG 2 2.  Patient ambulates level surfaces with least restrictive device with equal weight bearing and heel strike bilaterally  -     STG 2 Progress Met  -     STG 3 3.  Patient exhibits AROM right knee to 120 degrees to aid in functional mobility  -     STG 3 Progress Met  -     Long Term Goals    LTG 1 1.  Patient demonstrates increased strength right LE by one muscle grade  -     LTG 1 Progress Partially Met  -     LTG 2 2.  Patient ambulates stairs and unlevel surfaces safely with least restrictive device  -     LTG 2 Progress Met  -     LTG 3 3.  Patient independent with HEP  -     LTG 3 Progress Partially Met  -     LTG 3 Progress Comments Able to complete partially (I) but needing occasional cues for reps and/or technique  -       User Key  (r) = Recorded By, (t) = Taken By, (c) = Cosigned By    Initials Name Provider Type     Coy Rojas PTA Physical Therapy Assistant          OP PT Discharge Summary  Date of Discharge: 17  Reason for Discharge: Per MD order  Outcomes Achieved: Patient able to partially acheive established goals  Discharge Instructions: Pt to continue with HEP      Time Calculation:                    Coy Rojas PTA  2017

## 2017-09-05 RX ORDER — PANTOPRAZOLE SODIUM 40 MG/1
TABLET, DELAYED RELEASE ORAL
Qty: 30 TABLET | Refills: 0 | Status: SHIPPED | OUTPATIENT
Start: 2017-09-05 | End: 2017-10-04 | Stop reason: SDUPTHER

## 2017-09-07 ENCOUNTER — OFFICE VISIT (OUTPATIENT)
Dept: ORTHOPEDIC SURGERY | Facility: CLINIC | Age: 77
End: 2017-09-07

## 2017-09-07 DIAGNOSIS — Z96.651 STATUS POST TOTAL RIGHT KNEE REPLACEMENT: Primary | ICD-10-CM

## 2017-09-07 PROCEDURE — 99024 POSTOP FOLLOW-UP VISIT: CPT | Performed by: ORTHOPAEDIC SURGERY

## 2017-09-07 RX ORDER — SILODOSIN 8 MG/1
8 CAPSULE ORAL
COMMUNITY
Start: 2017-09-07 | End: 2019-10-09

## 2017-09-07 NOTE — PROGRESS NOTES
Subjective: Status post right total knee     Patient ID: Zach Gutierrez is a 77 y.o. male.    Chief Complaint:    History of Present Illness patient is just under 3 months from a right total knee arthroplasty and is doing well with all of his preoperative pain had resolved.  His ambulating independently freely without cane or crutch.       Social History     Occupational History   • Not on file.     Social History Main Topics   • Smoking status: Former Smoker   • Smokeless tobacco: Never Used   • Alcohol use Yes      Comment: one jim/week   • Drug use: No   • Sexual activity: Defer      Review of Systems   Constitutional: Negative for chills, diaphoresis, fever and unexpected weight change.   HENT: Negative for hearing loss, nosebleeds, sore throat and tinnitus.    Eyes: Negative for pain and visual disturbance.   Respiratory: Negative for cough, shortness of breath and wheezing.    Cardiovascular: Negative for chest pain and palpitations.   Gastrointestinal: Negative for abdominal pain, diarrhea, nausea and vomiting.   Endocrine: Negative for cold intolerance, heat intolerance and polydipsia.   Genitourinary: Negative for difficulty urinating, dysuria and hematuria.   Musculoskeletal: Negative for arthralgias, joint swelling and myalgias.   Skin: Negative for rash and wound.   Allergic/Immunologic: Negative for environmental allergies.   Neurological: Negative for dizziness, syncope and numbness.   Hematological: Does not bruise/bleed easily.   Psychiatric/Behavioral: Negative for dysphoric mood and sleep disturbance. The patient is not nervous/anxious.          Past Medical History:   Diagnosis Date   • Arthritis    • Dementia    • Diabetes    • Knee sprain    • ZENA (obstructive sleep apnea)     cpap     Past Surgical History:   Procedure Laterality Date   • COLONOSCOPY N/A 5/19/2017    Procedure: COLONOSCOPY;  Surgeon: Srikanth Payan MD;  Location: [facility];  Service:    • HERNIA REPAIR  Bilateral     inguinal   • DE TOTAL KNEE ARTHROPLASTY Right 6/20/2017    Procedure: TOTAL KNEE ARTHROPLASTY hima orthoalign antibiotic cement 7fr hemovac placement;  Surgeon: Bj Thrasher MD;  Location: [facility];  Service: Orthopedics   • SHOULDER SURGERY Right 2007    RCR     No family history on file.      Objective:  There were no vitals filed for this visit.  There were no vitals filed for this visit.  There is no height or weight on file to calculate BMI.       Ortho Exam  he is alert and oriented ×3.  The knee has 0-125° of motion with no instability throughout the arc of motion.  His calf is nontender.  Quad function is 4 out of 5.  He does have some weakness of the right quad compared to the left quad.  The skin is cool to touch.  His no effusion noted to the knee.    Assessment:       1. Status post total right knee replacement          Plan:  Activity as tolerated.  The getting ready to go on [place] trip [place] 2 [place].  He'll return at that time we'll send him to therapy for home strengthening program.          Work Status:    LATRELL query complete.    Orders:  No orders of the defined types were placed in this encounter.      Medications:  New Medications Ordered This Visit   Medications   • RAPAFLO 8 MG capsule capsule       Followup:  Return in about 4 weeks (around 10/5/2017).          Dragon transcription disclaimer     Much of this encounter note is an electronic transcription/translation of spoken language to printed text. The electronic translation of spoken language may permit erroneous, or at times, nonsensical words or phrases to be inadvertently transcribed. Although I have reviewed the note for such errors, some may still exist.

## 2017-09-29 ENCOUNTER — TELEPHONE (OUTPATIENT)
Dept: ORTHOPEDIC SURGERY | Facility: CLINIC | Age: 77
End: 2017-09-29

## 2017-10-03 ENCOUNTER — APPOINTMENT (OUTPATIENT)
Dept: SLEEP MEDICINE | Facility: HOSPITAL | Age: 77
End: 2017-10-03

## 2017-10-04 ENCOUNTER — APPOINTMENT (OUTPATIENT)
Dept: PREADMISSION TESTING | Facility: HOSPITAL | Age: 77
End: 2017-10-04

## 2017-10-04 VITALS
BODY MASS INDEX: 28 KG/M2 | RESPIRATION RATE: 16 BRPM | HEART RATE: 80 BPM | DIASTOLIC BLOOD PRESSURE: 71 MMHG | SYSTOLIC BLOOD PRESSURE: 121 MMHG | WEIGHT: 200 LBS | OXYGEN SATURATION: 98 % | HEIGHT: 71 IN

## 2017-10-04 LAB
ANION GAP SERPL CALCULATED.3IONS-SCNC: 11.6 MMOL/L
APTT PPP: 29.2 SECONDS (ref 24.3–38.1)
BACTERIA UR QL AUTO: ABNORMAL /HPF
BILIRUB UR QL STRIP: ABNORMAL
BUN BLD-MCNC: 20 MG/DL (ref 8–23)
BUN/CREAT SERPL: 23.3 (ref 7–25)
CALCIUM SPEC-SCNC: 9.1 MG/DL (ref 8.8–10.5)
CHLORIDE SERPL-SCNC: 98 MMOL/L (ref 98–107)
CLARITY UR: ABNORMAL
CO2 SERPL-SCNC: 27.4 MMOL/L (ref 22–29)
COLOR UR: ABNORMAL
CREAT BLD-MCNC: 0.86 MG/DL (ref 0.76–1.27)
DEPRECATED RDW RBC AUTO: 44.2 FL (ref 37–54)
EOSINOPHIL # BLD MANUAL: 0.12 10*3/MM3 (ref 0.1–0.3)
EOSINOPHIL NFR BLD MANUAL: 2 % (ref 0–4)
ERYTHROCYTE [DISTWIDTH] IN BLOOD BY AUTOMATED COUNT: 13.1 % (ref 11.5–14.5)
GFR SERPL CREATININE-BSD FRML MDRD: 87 ML/MIN/1.73
GLUCOSE BLD-MCNC: 156 MG/DL (ref 65–99)
GLUCOSE UR STRIP-MCNC: NEGATIVE MG/DL
HCT VFR BLD AUTO: 38.3 % (ref 42–52)
HGB BLD-MCNC: 13 G/DL (ref 14–18)
HGB UR QL STRIP.AUTO: ABNORMAL
HYALINE CASTS UR QL AUTO: ABNORMAL /LPF
INR PPP: 1 (ref 0.9–1.1)
KETONES UR QL STRIP: ABNORMAL
LEUKOCYTE ESTERASE UR QL STRIP.AUTO: ABNORMAL
LYMPHOCYTES # BLD MANUAL: 1.47 10*3/MM3 (ref 0.6–4.8)
LYMPHOCYTES NFR BLD MANUAL: 1 % (ref 3–8)
LYMPHOCYTES NFR BLD MANUAL: 25 % (ref 20–45)
MCH RBC QN AUTO: 31.5 PG (ref 27–31)
MCHC RBC AUTO-ENTMCNC: 33.9 G/DL (ref 31–37)
MCV RBC AUTO: 92.7 FL (ref 80–94)
MONOCYTES # BLD AUTO: 0.06 10*3/MM3 (ref 0–1)
NEUTROPHILS # BLD AUTO: 4.23 10*3/MM3 (ref 1.5–8.3)
NEUTROPHILS NFR BLD MANUAL: 72 % (ref 45–70)
NITRITE UR QL STRIP: POSITIVE
PH UR STRIP.AUTO: 6.5 [PH] (ref 4.5–8)
PLATELET # BLD AUTO: 246 10*3/MM3 (ref 140–500)
PMV BLD AUTO: 8.9 FL (ref 7.4–10.4)
POTASSIUM BLD-SCNC: 4.4 MMOL/L (ref 3.5–5.2)
PROT UR QL STRIP: ABNORMAL
PROTHROMBIN TIME: 13.2 SECONDS (ref 12.1–15)
RBC # BLD AUTO: 4.13 10*6/MM3 (ref 4.7–6.1)
RBC # UR: ABNORMAL /HPF
RBC MORPH BLD: NORMAL
REF LAB TEST METHOD: ABNORMAL
SMALL PLATELETS BLD QL SMEAR: ADEQUATE
SODIUM BLD-SCNC: 137 MMOL/L (ref 136–145)
SP GR UR STRIP: 1.02 (ref 1–1.03)
SQUAMOUS #/AREA URNS HPF: ABNORMAL /HPF
UROBILINOGEN UR QL STRIP: ABNORMAL
WBC MORPH BLD: NORMAL
WBC NRBC COR # BLD: 5.87 10*3/MM3 (ref 4.8–10.8)
WBC UR QL AUTO: ABNORMAL /HPF

## 2017-10-04 PROCEDURE — 85027 COMPLETE CBC AUTOMATED: CPT | Performed by: UROLOGY

## 2017-10-04 PROCEDURE — 85730 THROMBOPLASTIN TIME PARTIAL: CPT | Performed by: UROLOGY

## 2017-10-04 PROCEDURE — 85610 PROTHROMBIN TIME: CPT | Performed by: UROLOGY

## 2017-10-04 PROCEDURE — 81001 URINALYSIS AUTO W/SCOPE: CPT | Performed by: UROLOGY

## 2017-10-04 PROCEDURE — 85007 BL SMEAR W/DIFF WBC COUNT: CPT | Performed by: UROLOGY

## 2017-10-04 PROCEDURE — 87086 URINE CULTURE/COLONY COUNT: CPT | Performed by: UROLOGY

## 2017-10-04 PROCEDURE — 36415 COLL VENOUS BLD VENIPUNCTURE: CPT

## 2017-10-04 PROCEDURE — 80048 BASIC METABOLIC PNL TOTAL CA: CPT | Performed by: UROLOGY

## 2017-10-04 RX ORDER — ASPIRIN 81 MG/1
81 TABLET, CHEWABLE ORAL EVERY MORNING
COMMUNITY
End: 2017-10-19 | Stop reason: HOSPADM

## 2017-10-04 RX ORDER — FEXOFENADINE HCL 180 MG/1
180 TABLET ORAL DAILY
COMMUNITY

## 2017-10-04 RX ORDER — PANTOPRAZOLE SODIUM 40 MG/1
TABLET, DELAYED RELEASE ORAL
Qty: 30 TABLET | Refills: 0 | Status: SHIPPED | OUTPATIENT
Start: 2017-10-04 | End: 2017-10-19 | Stop reason: HOSPADM

## 2017-10-04 RX ORDER — PANTOPRAZOLE SODIUM 40 MG/1
40 TABLET, DELAYED RELEASE ORAL EVERY MORNING
COMMUNITY
End: 2018-01-08

## 2017-10-04 RX ORDER — AMPICILLIN 500 MG/1
500 CAPSULE ORAL 4 TIMES DAILY
COMMUNITY
End: 2017-10-19 | Stop reason: HOSPADM

## 2017-10-04 NOTE — DISCHARGE INSTRUCTIONS
STOP CLEARS/GATORADE @ 9AM    MORNING OF SURGERY TAKE PANTOPRAZOLE    BRING CPAP    HOLD ASPIRIN FOR 1 WEEK PRIOR TO SURGERY    PRE-ADMISSION TESTING INSTRUCTIONS FOR ADULTS      General Instructions:    • Do not eat solid food after midnight the night before surgery.  No gum, mints, or hard candy after midnight the night before surgery.  • You may drink clear liquids the day of surgery up until 2 hours before your arrival time.  • Clear liquids are liquids you can see through. Nothing RED in color.    Plain water    Sports drinks  Sodas     Gelatin (Jell-O)  Fruit juices without pulp such as white grape juice and apple juice  Popsicles that contain no fruit or yogurt  Tea or coffee (no cream or milk added)    • It is beneficial for you to have a clear drink that contains carbohydrates just before you leave your house and before your fasting time begins.  We suggest a 20 ounce bottle of Gatorade or Powerade for non-diabetic patients or a 20 ounce bottle of G2 or Powerade Zero for diabetic patients.     • Patients who avoid smoking, chewing tobacco and alcohol for 4 weeks prior to surgery have a reduced risk of post-operative complications.  • Do not smoke, use chewing tobacco or drink alcohol the day of surgery    • Bring your C-PAP/ BI-PAP machine if you use one.  • Wear clean comfortable clothes and socks.  • Do not wear contact lenses, lotion, deodorant, or make-up.  Bring a case for your glasses if applicable.   • Bring crutches or walker if applicable.  • Leave all other valuables and jewelry at home.      Preventing a Surgical Site Infection:    • Shower the night before and on the morning of surgery using the chlorhexidine soap you were given.  Use a clean washcloth with the soap.  Place clean sheets on your bed after showering the night before surgery. Do not use the CHG soap on your hair, face, or private areas. Wash your body gently for five (5) minutes. Do not scrub your skin too hard.  Dry with a clean  towel and dress in clean clothing.    • Do not shave the surgical area for 10 days-2 weeks prior to surgery  because the razor can irritate skin and make it easier to develop an infection.    • Make sure you, your family, and all healthcare providers clean their hands with soap and water or an alcohol based hand  before caring for you or your wound.    • If at all possible, quit smoking as many days before surgery as you can.    Day of surgery:    Your surgeon’s office will advise you of your arrival time for the day of surgery.    Upon arrival, a Pre-op nurse and Anesthesia provider will review your health history, obtain vital signs, and answer questions you may have.  The only belongings needed at this time will be your home medications and if applicable your C-PAP/BI-PAP machine.  If you are staying overnight your family can leave the rest of your belongings in the car and bring them to your room later.  A Pre-op nurse will start an IV and you may receive medication in preparation for surgery, including something to help you relax.  Your family will be able to see you in the Pre-op area.  While you are in surgery your family should notify the waiting room  if they leave the waiting room area and provide a contact phone number.    IF you have any questions, you can call the Pre-Admission Department at [phone] or your surgeon's office.    Please be aware that surgery does come with discomfort.  We want to make every effort to control your discomfort so please discuss any uncontrolled symptoms with your nurse.   Your doctor will most likely have prescribed pain medications.      If you are going home after surgery, you will receive individualized written care instructions before being discharged.  A responsible adult (over the age of 18) must drive you to and from the hospital on the day of your surgery and stay with you for 24 hours after anesthesia.    If you are staying overnight  following surgery, you will be transported to your hospital room following the recovery period.  [facility] has all private rooms.    Deductibles and co-payments are collected on the day of service. Please be prepared to pay the required co-pay, deductible or deposit on the day of service as defined by your plan.

## 2017-10-06 LAB — BACTERIA SPEC AEROBE CULT: NO GROWTH

## 2017-10-13 ENCOUNTER — ANESTHESIA EVENT (OUTPATIENT)
Dept: PERIOP | Facility: HOSPITAL | Age: 77
End: 2017-10-13

## 2017-10-16 ENCOUNTER — OFFICE VISIT (OUTPATIENT)
Dept: ORTHOPEDIC SURGERY | Facility: CLINIC | Age: 77
End: 2017-10-16

## 2017-10-16 ENCOUNTER — HOSPITAL ENCOUNTER (OUTPATIENT)
Facility: HOSPITAL | Age: 77
Discharge: HOME | End: 2017-10-19
Attending: UROLOGY | Admitting: UROLOGY

## 2017-10-16 ENCOUNTER — ANESTHESIA (OUTPATIENT)
Dept: PERIOP | Facility: HOSPITAL | Age: 77
End: 2017-10-16

## 2017-10-16 VITALS — BODY MASS INDEX: 28 KG/M2 | HEIGHT: 71 IN | WEIGHT: 200 LBS

## 2017-10-16 DIAGNOSIS — N40.1 BPH WITH OBSTRUCTION/LOWER URINARY TRACT SYMPTOMS: ICD-10-CM

## 2017-10-16 DIAGNOSIS — N13.8 BPH WITH OBSTRUCTION/LOWER URINARY TRACT SYMPTOMS: ICD-10-CM

## 2017-10-16 DIAGNOSIS — Z96.651 STATUS POST TOTAL RIGHT KNEE REPLACEMENT: Primary | ICD-10-CM

## 2017-10-16 LAB
ANION GAP SERPL CALCULATED.3IONS-SCNC: 8.2 MMOL/L
BUN BLD-MCNC: 13 MG/DL (ref 8–23)
BUN/CREAT SERPL: 17.1 (ref 7–25)
CALCIUM SPEC-SCNC: 8.3 MG/DL (ref 8.8–10.5)
CHLORIDE SERPL-SCNC: 104 MMOL/L (ref 98–107)
CO2 SERPL-SCNC: 28.8 MMOL/L (ref 22–29)
CREAT BLD-MCNC: 0.76 MG/DL (ref 0.76–1.27)
DEPRECATED RDW RBC AUTO: 44.1 FL (ref 37–54)
ERYTHROCYTE [DISTWIDTH] IN BLOOD BY AUTOMATED COUNT: 13 % (ref 11.5–14.5)
GFR SERPL CREATININE-BSD FRML MDRD: 100 ML/MIN/1.73
GLUCOSE BLD-MCNC: 174 MG/DL (ref 65–99)
GLUCOSE BLDC GLUCOMTR-MCNC: 127 MG/DL (ref 70–130)
HCT VFR BLD AUTO: 35.6 % (ref 42–52)
HGB BLD-MCNC: 11.9 G/DL (ref 14–18)
MCH RBC QN AUTO: 31.1 PG (ref 27–31)
MCHC RBC AUTO-ENTMCNC: 33.4 G/DL (ref 31–37)
MCV RBC AUTO: 93 FL (ref 80–94)
PLATELET # BLD AUTO: 201 10*3/MM3 (ref 140–500)
PMV BLD AUTO: 8.4 FL (ref 7.4–10.4)
POTASSIUM BLD-SCNC: 4.4 MMOL/L (ref 3.5–5.2)
RBC # BLD AUTO: 3.83 10*6/MM3 (ref 4.7–6.1)
SODIUM BLD-SCNC: 141 MMOL/L (ref 136–145)
WBC NRBC COR # BLD: 5.44 10*3/MM3 (ref 4.8–10.8)

## 2017-10-16 PROCEDURE — 25010000002 ONDANSETRON PER 1 MG: Performed by: NURSE ANESTHETIST, CERTIFIED REGISTERED

## 2017-10-16 PROCEDURE — 99213 OFFICE O/P EST LOW 20 MIN: CPT | Performed by: ORTHOPAEDIC SURGERY

## 2017-10-16 PROCEDURE — 85027 COMPLETE CBC AUTOMATED: CPT | Performed by: UROLOGY

## 2017-10-16 PROCEDURE — 94799 UNLISTED PULMONARY SVC/PX: CPT

## 2017-10-16 PROCEDURE — 25010000002 VANCOMYCIN PER 500 MG: Performed by: UROLOGY

## 2017-10-16 PROCEDURE — 82962 GLUCOSE BLOOD TEST: CPT

## 2017-10-16 PROCEDURE — 25810000003 SODIUM CHLORIDE 0.9 % WITH KCL 20 MEQ 20-0.9 MEQ/L-% SOLUTION: Performed by: UROLOGY

## 2017-10-16 PROCEDURE — G0378 HOSPITAL OBSERVATION PER HR: HCPCS

## 2017-10-16 PROCEDURE — 80048 BASIC METABOLIC PNL TOTAL CA: CPT | Performed by: UROLOGY

## 2017-10-16 PROCEDURE — 25010000002 PROPOFOL 10 MG/ML EMULSION: Performed by: NURSE ANESTHETIST, CERTIFIED REGISTERED

## 2017-10-16 PROCEDURE — 25010000002 HYDROMORPHONE PER 4 MG: Performed by: UROLOGY

## 2017-10-16 PROCEDURE — 25010000002 GENTAMICIN PER 80 MG: Performed by: UROLOGY

## 2017-10-16 RX ORDER — PROMETHAZINE HYDROCHLORIDE 25 MG/ML
12.5 INJECTION, SOLUTION INTRAMUSCULAR; INTRAVENOUS EVERY 6 HOURS PRN
Status: DISCONTINUED | OUTPATIENT
Start: 2017-10-16 | End: 2017-10-19 | Stop reason: HOSPADM

## 2017-10-16 RX ORDER — AMPICILLIN 500 MG/1
500 CAPSULE ORAL 4 TIMES DAILY
Status: DISCONTINUED | OUTPATIENT
Start: 2017-10-16 | End: 2017-10-19 | Stop reason: HOSPADM

## 2017-10-16 RX ORDER — PANTOPRAZOLE SODIUM 20 MG/1
40 TABLET, DELAYED RELEASE ORAL EVERY MORNING
Status: DISCONTINUED | OUTPATIENT
Start: 2017-10-17 | End: 2017-10-19 | Stop reason: HOSPADM

## 2017-10-16 RX ORDER — HYDROCODONE BITARTRATE AND ACETAMINOPHEN 5; 325 MG/1; MG/1
1 TABLET ORAL EVERY 4 HOURS PRN
Status: DISCONTINUED | OUTPATIENT
Start: 2017-10-16 | End: 2017-10-19 | Stop reason: HOSPADM

## 2017-10-16 RX ORDER — ONDANSETRON 2 MG/ML
4 INJECTION INTRAMUSCULAR; INTRAVENOUS ONCE
Status: COMPLETED | OUTPATIENT
Start: 2017-10-16 | End: 2017-10-16

## 2017-10-16 RX ORDER — MAGNESIUM HYDROXIDE 1200 MG/15ML
LIQUID ORAL
Status: COMPLETED
Start: 2017-10-16 | End: 2017-10-19

## 2017-10-16 RX ORDER — BISACODYL 10 MG
10 SUPPOSITORY, RECTAL RECTAL DAILY PRN
Status: DISCONTINUED | OUTPATIENT
Start: 2017-10-16 | End: 2017-10-19 | Stop reason: HOSPADM

## 2017-10-16 RX ORDER — MAGNESIUM HYDROXIDE 1200 MG/15ML
LIQUID ORAL AS NEEDED
Status: DISCONTINUED | OUTPATIENT
Start: 2017-10-16 | End: 2017-10-16 | Stop reason: HOSPADM

## 2017-10-16 RX ORDER — SODIUM CHLORIDE, SODIUM LACTATE, POTASSIUM CHLORIDE, CALCIUM CHLORIDE 600; 310; 30; 20 MG/100ML; MG/100ML; MG/100ML; MG/100ML
9 INJECTION, SOLUTION INTRAVENOUS CONTINUOUS
Status: DISCONTINUED | OUTPATIENT
Start: 2017-10-16 | End: 2017-10-18

## 2017-10-16 RX ORDER — FAMOTIDINE 10 MG/ML
20 INJECTION, SOLUTION INTRAVENOUS ONCE
Status: COMPLETED | OUTPATIENT
Start: 2017-10-16 | End: 2017-10-16

## 2017-10-16 RX ORDER — SODIUM CHLORIDE AND POTASSIUM CHLORIDE 150; 900 MG/100ML; MG/100ML
100 INJECTION, SOLUTION INTRAVENOUS CONTINUOUS
Status: DISCONTINUED | OUTPATIENT
Start: 2017-10-16 | End: 2017-10-17

## 2017-10-16 RX ORDER — DONEPEZIL HYDROCHLORIDE 5 MG/1
10 TABLET, FILM COATED ORAL NIGHTLY
Status: DISCONTINUED | OUTPATIENT
Start: 2017-10-16 | End: 2017-10-19 | Stop reason: HOSPADM

## 2017-10-16 RX ORDER — BUPIVACAINE HYDROCHLORIDE 5 MG/ML
INJECTION, SOLUTION EPIDURAL; INTRACAUDAL AS NEEDED
Status: DISCONTINUED | OUTPATIENT
Start: 2017-10-16 | End: 2017-10-16 | Stop reason: SURG

## 2017-10-16 RX ORDER — SORBITOL 30 G/1000ML
IRRIGANT IRRIGATION AS NEEDED
Status: DISCONTINUED | OUTPATIENT
Start: 2017-10-16 | End: 2017-10-16 | Stop reason: HOSPADM

## 2017-10-16 RX ORDER — VANCOMYCIN HCL-SODIUM CHLORIDE IV SOLN 1.5 GM/250ML-0.9% 1.5-0.9/25 GM/ML-%
SOLUTION INTRAVENOUS
Status: DISCONTINUED
Start: 2017-10-16 | End: 2017-10-16 | Stop reason: WASHOUT

## 2017-10-16 RX ORDER — MAGNESIUM HYDROXIDE 1200 MG/15ML
3000 LIQUID ORAL CONTINUOUS
Status: DISCONTINUED | OUTPATIENT
Start: 2017-10-16 | End: 2017-10-19 | Stop reason: HOSPADM

## 2017-10-16 RX ORDER — SODIUM CHLORIDE 9 MG/ML
40 INJECTION, SOLUTION INTRAVENOUS AS NEEDED
Status: DISCONTINUED | OUTPATIENT
Start: 2017-10-16 | End: 2017-10-16 | Stop reason: HOSPADM

## 2017-10-16 RX ORDER — MEMANTINE HYDROCHLORIDE 5 MG/1
2.5 TABLET ORAL EVERY 12 HOURS SCHEDULED
Status: DISCONTINUED | OUTPATIENT
Start: 2017-10-16 | End: 2017-10-19 | Stop reason: HOSPADM

## 2017-10-16 RX ORDER — GLYCOPYRROLATE 0.2 MG/ML
INJECTION INTRAMUSCULAR; INTRAVENOUS AS NEEDED
Status: DISCONTINUED | OUTPATIENT
Start: 2017-10-16 | End: 2017-10-16 | Stop reason: SURG

## 2017-10-16 RX ORDER — SODIUM CHLORIDE 0.9 % (FLUSH) 0.9 %
1-10 SYRINGE (ML) INJECTION AS NEEDED
Status: DISCONTINUED | OUTPATIENT
Start: 2017-10-16 | End: 2017-10-16 | Stop reason: HOSPADM

## 2017-10-16 RX ORDER — PROPOFOL 10 MG/ML
VIAL (ML) INTRAVENOUS CONTINUOUS PRN
Status: DISCONTINUED | OUTPATIENT
Start: 2017-10-16 | End: 2017-10-16 | Stop reason: SURG

## 2017-10-16 RX ORDER — LIDOCAINE HYDROCHLORIDE 20 MG/ML
10 INJECTION, SOLUTION INFILTRATION; PERINEURAL ONCE
Status: DISCONTINUED | OUTPATIENT
Start: 2017-10-16 | End: 2017-10-19 | Stop reason: HOSPADM

## 2017-10-16 RX ORDER — CETIRIZINE HYDROCHLORIDE 10 MG/1
10 TABLET ORAL DAILY
Status: DISCONTINUED | OUTPATIENT
Start: 2017-10-16 | End: 2017-10-19 | Stop reason: HOSPADM

## 2017-10-16 RX ORDER — FINASTERIDE 5 MG/1
5 TABLET, FILM COATED ORAL NIGHTLY
Status: DISCONTINUED | OUTPATIENT
Start: 2017-10-16 | End: 2017-10-19 | Stop reason: HOSPADM

## 2017-10-16 RX ORDER — PANTOPRAZOLE SODIUM 20 MG/1
40 TABLET, DELAYED RELEASE ORAL
Status: DISCONTINUED | OUTPATIENT
Start: 2017-10-16 | End: 2017-10-17

## 2017-10-16 RX ORDER — LIDOCAINE HYDROCHLORIDE 10 MG/ML
0.5 INJECTION, SOLUTION EPIDURAL; INFILTRATION; INTRACAUDAL; PERINEURAL ONCE AS NEEDED
Status: COMPLETED | OUTPATIENT
Start: 2017-10-16 | End: 2017-10-16

## 2017-10-16 RX ORDER — NALOXONE HCL 0.4 MG/ML
0.1 VIAL (ML) INJECTION
Status: DISCONTINUED | OUTPATIENT
Start: 2017-10-16 | End: 2017-10-19 | Stop reason: HOSPADM

## 2017-10-16 RX ADMIN — BUPIVACAINE HYDROCHLORIDE 3 ML: 5 INJECTION, SOLUTION EPIDURAL; INTRACAUDAL; PERINEURAL at 13:04

## 2017-10-16 RX ADMIN — SODIUM CHLORIDE 3000 ML: 900 IRRIGANT IRRIGATION at 18:15

## 2017-10-16 RX ADMIN — GENTAMICIN SULFATE: 40 INJECTION, SOLUTION INTRAMUSCULAR; INTRAVENOUS at 13:14

## 2017-10-16 RX ADMIN — AMPICILLIN 500 MG: 500 CAPSULE ORAL at 17:16

## 2017-10-16 RX ADMIN — FINASTERIDE 5 MG: 5 TABLET, FILM COATED ORAL at 20:25

## 2017-10-16 RX ADMIN — FAMOTIDINE 20 MG: 10 INJECTION, SOLUTION INTRAVENOUS at 12:27

## 2017-10-16 RX ADMIN — SODIUM CHLORIDE 3000 ML: 900 IRRIGANT IRRIGATION at 16:01

## 2017-10-16 RX ADMIN — HYDROCODONE BITARTRATE AND ACETAMINOPHEN 1 TABLET: 5; 325 TABLET ORAL at 22:19

## 2017-10-16 RX ADMIN — DONEPEZIL HYDROCHLORIDE 10 MG: 5 TABLET, FILM COATED ORAL at 20:23

## 2017-10-16 RX ADMIN — SODIUM CHLORIDE AND POTASSIUM CHLORIDE 100 ML/HR: 9; 1.49 INJECTION, SOLUTION INTRAVENOUS at 16:20

## 2017-10-16 RX ADMIN — SODIUM CHLORIDE 1000 MG: 900 INJECTION, SOLUTION INTRAVENOUS at 12:27

## 2017-10-16 RX ADMIN — PANTOPRAZOLE SODIUM 40 MG: 20 TABLET, DELAYED RELEASE ORAL at 17:16

## 2017-10-16 RX ADMIN — HYDROMORPHONE HYDROCHLORIDE 0.5 MG: 1 INJECTION, SOLUTION INTRAMUSCULAR; INTRAVENOUS; SUBCUTANEOUS at 23:27

## 2017-10-16 RX ADMIN — AMPICILLIN 500 MG: 500 CAPSULE ORAL at 20:24

## 2017-10-16 RX ADMIN — MEMANTINE 2.5 MG: 5 TABLET ORAL at 20:23

## 2017-10-16 RX ADMIN — SODIUM CHLORIDE, POTASSIUM CHLORIDE, SODIUM LACTATE AND CALCIUM CHLORIDE 9 ML/HR: 600; 310; 30; 20 INJECTION, SOLUTION INTRAVENOUS at 12:08

## 2017-10-16 RX ADMIN — ATROPA BELLADONNA AND OPIUM 30 MG: 16.2; 3 SUPPOSITORY RECTAL at 21:44

## 2017-10-16 RX ADMIN — HYDROCODONE BITARTRATE AND ACETAMINOPHEN 1 TABLET: 5; 325 TABLET ORAL at 17:16

## 2017-10-16 RX ADMIN — ONDANSETRON 4 MG: 2 INJECTION, SOLUTION INTRAMUSCULAR; INTRAVENOUS at 12:27

## 2017-10-16 RX ADMIN — PROPOFOL 50 MCG/KG/MIN: 10 INJECTION, EMULSION INTRAVENOUS at 13:15

## 2017-10-16 RX ADMIN — LIDOCAINE HYDROCHLORIDE 0.5 ML: 10 INJECTION, SOLUTION EPIDURAL; INFILTRATION; INTRACAUDAL; PERINEURAL at 12:08

## 2017-10-16 RX ADMIN — GLYCOPYRROLATE 0.1 MG: 0.2 INJECTION INTRAMUSCULAR; INTRAVENOUS at 13:47

## 2017-10-16 NOTE — OP NOTE
Preoperative diagnosis bilateral median and anterior lobe obstructing prostate protruding into the bladder moderate to severe trabeculations    Postoperative diagnosis same    Operative procedure cystoscopy TUR of the prostate    Surgeon Dr. Yang    Anesthesia spinal with sedation    Blood loss 150 cc complications none    Procedure note this 77-year-old gentleman presented with a progressive symptoms of prostatism and urinary retention is has been on the Proscar and Rapaflo preoperatively and is undergo the above-mentioned procedure SPE prophylaxis was given with a history of right knee replacement procedure and attendant risks given discussed at length with he and his wife the understood proceed    Timeout was taken vancomycin and gentamicin was given preoperatively after adequate spinal anesthesia is placed very carefully modified dorsolithotomy position all pressure points relieved no overt exaggeration of any of the lower extremity joints    He was intraurethral Xylocaine jelly was administered a 30 Bruneian Naila sound dilation with a 27 Bruneian continuous-flow resectoscope sheath was placed into the bladder above-mentioned findings refer started resection to the median lobe down to the transverse fibers of the bladder neck being wife the ureteral orifices right and left lateral lobes were resected including the anterior lobe prostate each the lobes are actually protruding into the bladder itself finally after adequate resection curettings were irrigated free from bladder and sent to pathology there was some subtrigonal dissection with involvement little bit of the bladder neck.  Rollerball electrocautery hemostasis which was verified while normotensive after completion of this with curettings irrigated free from bladder margins resection away from ureteral orifice sees a 22 Bruneian hematuria catheter was in place with 60 cc in the balloon with a continuous sterile saline irrigation with a very light pink  to clear return patient procedure well no suppositories placed per rectum he was sent to the recovery room in satisfactory condition findings discussed with his wife

## 2017-10-16 NOTE — BRIEF OP NOTE
CYSTOSCOPY TRANSURETHRAL RESECTION OF PROSTATE  Progress Note    Zach Gutierrez  10/16/2017    Pre-op Diagnosis:   N40.1       Post-Op Diagnosis Codes:   RETENTION  TRILOBAR PROSTATE OBSTRUCTION      Procedure/CPT® Codes:      Procedure(s):  CYSTOSCOPY TRANSURETHRAL RESECTION OF PROSTATE    Surgeon(s):  Basil Yang MD    Anesthesia: General    Staff:   Circulator: Charlene Garcia RN  Scrub Person: Nahomy Palafox  Other: Ashley Thomson RN    Estimated Blood Loss: 150 mL    Urine Voided: * No values recorded between 10/16/2017  1:14 PM and 10/16/2017  2:42 PM *    Specimens:                  ID Type Source Tests Collected by Time Destination   A : prostate chips Tissue Prostate TISSUE EXAM Basil Yang MD 10/16/2017 1357          Drains:           Findings: FRIABLE LARGE TRILOBAR AND INTRAVESICLE ANTERIOR LOBE    Complications: NONE        Basil Yang MD     Date: 10/16/2017  Time: 2:42 PM

## 2017-10-16 NOTE — PROGRESS NOTES
Subjective: Status post right total knee     Patient ID: Zach Gutierrez is a 77 y.o. male.    Chief Complaint:    History of Present Illness 77-year-old male is 3-1/2 months status post right total knee doing extremely well.  No complaints of pain and/or discomfort.  When on a cruise and other than a urinary tract infection he had the knee gave no problem with all the walking that he did for that 2 weeks cruise.       Social History     Occupational History   • Not on file.     Social History Main Topics   • Smoking status: Never Smoker   • Smokeless tobacco: Never Used   • Alcohol use Yes      Comment: one jim/week   • Drug use: No   • Sexual activity: Defer      Review of Systems   Constitutional: Negative for chills, diaphoresis, fever and unexpected weight change.   HENT: Negative for hearing loss, nosebleeds, sore throat and tinnitus.    Eyes: Negative for pain and visual disturbance.   Respiratory: Negative for cough, shortness of breath and wheezing.    Cardiovascular: Negative for chest pain and palpitations.   Gastrointestinal: Negative for abdominal pain, diarrhea, nausea and vomiting.   Endocrine: Negative for cold intolerance, heat intolerance and polydipsia.   Genitourinary: Negative for difficulty urinating, dysuria and hematuria.   Musculoskeletal: Positive for arthralgias. Negative for joint swelling and myalgias.   Skin: Negative for rash and wound.   Allergic/Immunologic: Negative for environmental allergies.   Neurological: Negative for dizziness, syncope and numbness.   Hematological: Does not bruise/bleed easily.   Psychiatric/Behavioral: Negative for dysphoric mood and sleep disturbance. The patient is not nervous/anxious.          Past Medical History:   Diagnosis Date   • Arthritis     BACK PAIN   • Dementia    • Diabetes     TYPE 2   • Knee sprain    • ZENA (obstructive sleep apnea)     cpap     Past Surgical History:   Procedure Laterality Date   • COLONOSCOPY N/A 5/19/2017     Procedure: COLONOSCOPY;  Surgeon: Srikanth Payan MD;  Location: [facility] OR;  Service:    • HERNIA REPAIR Bilateral     inguinal   • [place] TOTAL KNEE ARTHROPLASTY Right 6/20/2017    Procedure: TOTAL KNEE ARTHROPLASTY hima orthoalign antibiotic cement 7fr hemovac placement;  Surgeon: Bj Thrasher MD;  Location: [facility] OR;  Service: Orthopedics   • SHOULDER SURGERY Right 2007    RCR     History reviewed. No pertinent family history.      Objective:  There were no vitals filed for this visit.  Last 3 weights    10/16/17  0944   Weight: 200 lb (90.7 kg)     Body mass index is 27.89 kg/(m^2).       Ortho Exam  he is alert and oriented ×3.  The right knee shows no swelling effusion erythema.  He has good distal pulses with no motor deficit.  No sensory loss.  The skin is cool to touch.  Quad function is 5 over 5.  No instability at 0 90°.  His calf is nontender negative Homans.  He is now currently off anti-inflammatories is getting ready to have bladder surgery but otherwise does take occasional over-the-counter anti-inflammatories with no GI side effects.    Assessment:       1. Status post total right knee replacement          Plan:      continue activity as tolerated.  Return in June 1 year anniversary of his surgery with an x-ray of the knee.  Was told to come in sooner if he h.as any questions or problems      Work Status:    LATRELL query complete.    Orders:  No orders of the defined types were placed in this encounter.      Medications:  No orders of the defined types were placed in this encounter.      Followup:  Return in about 8 months (around 6/16/2018).          Dragon transcription disclaimer     Much of this encounter note is an electronic transcription/translation of spoken language to printed text. The electronic translation of spoken language may permit erroneous, or at times, nonsensical words or phrases to be inadvertently transcribed. Although I have reviewed the note for such errors, some  may still exist.

## 2017-10-16 NOTE — ANESTHESIA POSTPROCEDURE EVALUATION
Patient: Zach Gutierrez    Procedure Summary     Date Anesthesia Start Anesthesia Stop Room / Location    10/16/17 1313 1451 [facility] / [facility]       Procedure Diagnosis Surgeon Provider    CYSTOSCOPY TRANSURETHRAL RESECTION OF PROSTATE (N/A ) (N40.1) MD Beverly Rodriguez CRNA          Anesthesia Type: spinal, general  Last vitals  BP   104/52 (10/16/17 1450)    Temp   97.5 °F (36.4 °C) (10/16/17 1450)    Pulse   55 (10/16/17 1450)   Resp   12 (10/16/17 1450)    SpO2   98 % (10/16/17 1450)      Post Anesthesia Care and Evaluation    Patient location during evaluation: PACU  Patient participation: complete - patient participated  Level of consciousness: awake and alert  Pain management: adequate  Airway patency: patent  Anesthetic complications: No anesthetic complications  PONV Status: none  Cardiovascular status: acceptable and hemodynamically stable  Respiratory status: acceptable and spontaneous ventilation  Hydration status: acceptable

## 2017-10-16 NOTE — PLAN OF CARE
Problem: Fall Risk (Adult)  Goal: Identify Related Risk Factors and Signs and Symptoms  Outcome: Outcome(s) achieved Date Met:  10/16/17  Goal: Absence of Falls  Outcome: Ongoing (interventions implemented as appropriate)

## 2017-10-16 NOTE — PLAN OF CARE
Problem: Patient Care Overview (Adult)  Goal: Adult Individualization and Mutuality  Outcome: Ongoing (interventions implemented as appropriate)    10/16/17 1204 10/16/17 1610   Individualization   Patient Specific Preferences goes by Gamal --    Patient Specific Goals --  go home asap   Patient Specific Interventions --  pain control   Mutuality/Individual Preferences   What Anxieties, Fears or Concerns Do You Have About Your Health or Care? --  none voiced   What Questions Do You Have About Your Health or Care? --  how did things go?   What Information Would Help Us Give You More Personalized Care? --  i just have my knee done in june

## 2017-10-16 NOTE — PLAN OF CARE
Problem: Perioperative Period (Adult)  Goal: Signs and Symptoms of Listed Potential Problems Will be Absent or Manageable (Perioperative Period)  Outcome: Ongoing (interventions implemented as appropriate)    10/16/17 1611   Perioperative Period   Problems Assessed (Perioperative Period) all   Problems Present (Perioperative Period) pain;physiologic stress response

## 2017-10-16 NOTE — PLAN OF CARE
Problem: Patient Care Overview (Adult)  Goal: Plan of Care Review  Outcome: Ongoing (interventions implemented as appropriate)    10/16/17 1204   Coping/Psychosocial Response Interventions   Plan Of Care Reviewed With patient   Patient Care Overview   Progress improving   Outcome Evaluation   Outcome Summary/Follow up Plan vss. no complaints of pain. family at bedside

## 2017-10-16 NOTE — PLAN OF CARE
Problem: Patient Care Overview (Adult)  Goal: Plan of Care Review  Outcome: Ongoing (interventions implemented as appropriate)    10/16/17 1775   Coping/Psychosocial Response Interventions   Plan Of Care Reviewed With patient   Patient Care Overview   Progress improving   Outcome Evaluation   Outcome Summary/Follow up Plan denies any pain or nausea

## 2017-10-16 NOTE — ANESTHESIA PREPROCEDURE EVALUATION
Anesthesia Evaluation     Patient summary reviewed   history of anesthetic complications (POST OP CONFUSION W TKA):  NPO Solid Status: > 8 hours  NPO Liquid Status: > 8 hours     Airway   Mallampati: II  TM distance: >3 FB  Neck ROM: full  Dental    (+) upper dentures and lower dentures    Pulmonary - normal exam    breath sounds clear to auscultation  (+) sleep apnea on CPAP,   Cardiovascular - normal exam  Exercise tolerance: good (4-7 METS)    ECG reviewed  Rhythm: regular  Rate: normal        Neuro/Psych  (+) dementia,    GI/Hepatic/Renal/Endo    (+)  diabetes mellitus (bs 124) type 2 well controlled,     Musculoskeletal     (+) arthralgias, back pain (HAS GOTTEN EPIDURAL INJECTIONS INPAST),   Abdominal  - normal exam   Substance History      OB/GYN          Other   (+) arthritis                               Anesthesia Plan    ASA 2     spinal   (SAB REQ PER WIFE    / GA IF NEC  / WILL AVOID MIDAZOLAM)  intravenous induction   Anesthetic plan and risks discussed with patient and spouse/significant other.  Use of blood products discussed with patient and spouse/significant other  Consented to blood products.

## 2017-10-16 NOTE — ANESTHESIA PROCEDURE NOTES
Spinal Block    Patient location during procedure: pre-op  Start Time: 10/16/2017 1:00 PM  Stop Time: 10/16/2017 1:05 PM  Indication:procedure for pain  Performed By  CRNA: ANDREWS NICHOLE  Preanesthetic Checklist  Completed: patient identified, surgical consent, pre-op evaluation, timeout performed, IV checked, risks and benefits discussed and monitors and equipment checked  Spinal Block Prep:  Patient Position:sitting  Sterile Tech:cap, gloves, gown, mask and sterile barriers  Prep:Betadine and Chloraprep  Patient Monitoring:blood pressure monitoring, continuous pulse oximetry and EKG  Spinal Block Procedure  Approach:midline  Guidance:landmark technique  Location:L3-L4  Needle Type:Pencan  Needle Gauge:27  Placement of Spinal needle event:cerebrospinal fluid aspirated  Paresthesia: no  Fluid Appearance:clear  Post Assessment  Patient Tolerance:patient tolerated the procedure well with no apparent complications  Complications no  Additional Notes  Lido @ % local  . Introducer needle used  / 1 attempt

## 2017-10-16 NOTE — PLAN OF CARE
Problem: Perioperative Period (Adult)  Goal: Signs and Symptoms of Listed Potential Problems Will be Absent or Manageable (Perioperative Period)  Outcome: Ongoing (interventions implemented as appropriate)    10/16/17 1205   Perioperative Period   Problems Assessed (Perioperative Period) all   Problems Present (Perioperative Period) none

## 2017-10-16 NOTE — PLAN OF CARE
Problem: Patient Care Overview (Adult)  Goal: Adult Individualization and Mutuality  Outcome: Ongoing (interventions implemented as appropriate)    10/16/17 1204   Individualization   Patient Specific Preferences goes by Gamal   Patient Specific Interventions appropriate interventions implemented as needed

## 2017-10-17 PROCEDURE — 25810000003 SODIUM CHLORIDE 0.9 % WITH KCL 20 MEQ 20-0.9 MEQ/L-% SOLUTION: Performed by: UROLOGY

## 2017-10-17 PROCEDURE — G0378 HOSPITAL OBSERVATION PER HR: HCPCS

## 2017-10-17 RX ORDER — ONDANSETRON 2 MG/ML
4 INJECTION INTRAMUSCULAR; INTRAVENOUS ONCE AS NEEDED
Status: DISCONTINUED | OUTPATIENT
Start: 2017-10-17 | End: 2017-10-19 | Stop reason: HOSPADM

## 2017-10-17 RX ORDER — FENTANYL CITRATE 50 UG/ML
25 INJECTION, SOLUTION INTRAMUSCULAR; INTRAVENOUS AS NEEDED
Status: DISCONTINUED | OUTPATIENT
Start: 2017-10-17 | End: 2017-10-19 | Stop reason: HOSPADM

## 2017-10-17 RX ORDER — SODIUM CHLORIDE, SODIUM LACTATE, POTASSIUM CHLORIDE, CALCIUM CHLORIDE 600; 310; 30; 20 MG/100ML; MG/100ML; MG/100ML; MG/100ML
100 INJECTION, SOLUTION INTRAVENOUS CONTINUOUS
Status: DISCONTINUED | OUTPATIENT
Start: 2017-10-17 | End: 2017-10-18

## 2017-10-17 RX ADMIN — SERTRALINE HYDROCHLORIDE 25 MG: 50 TABLET ORAL at 06:00

## 2017-10-17 RX ADMIN — CETIRIZINE HYDROCHLORIDE 10 MG: 10 TABLET, FILM COATED ORAL at 08:28

## 2017-10-17 RX ADMIN — SODIUM CHLORIDE, POTASSIUM CHLORIDE, SODIUM LACTATE AND CALCIUM CHLORIDE 100 ML/HR: 600; 310; 30; 20 INJECTION, SOLUTION INTRAVENOUS at 14:01

## 2017-10-17 RX ADMIN — SODIUM CHLORIDE 3000 ML: 900 IRRIGANT IRRIGATION at 16:36

## 2017-10-17 RX ADMIN — FINASTERIDE 5 MG: 5 TABLET, FILM COATED ORAL at 21:28

## 2017-10-17 RX ADMIN — METFORMIN HYDROCHLORIDE 1000 MG: 500 TABLET ORAL at 08:28

## 2017-10-17 RX ADMIN — SODIUM CHLORIDE 3000 ML: 900 IRRIGANT IRRIGATION at 09:45

## 2017-10-17 RX ADMIN — AMPICILLIN 500 MG: 500 CAPSULE ORAL at 21:28

## 2017-10-17 RX ADMIN — SODIUM CHLORIDE AND POTASSIUM CHLORIDE 100 ML/HR: 9; 1.49 INJECTION, SOLUTION INTRAVENOUS at 11:55

## 2017-10-17 RX ADMIN — AMPICILLIN 500 MG: 500 CAPSULE ORAL at 11:55

## 2017-10-17 RX ADMIN — AMPICILLIN 500 MG: 500 CAPSULE ORAL at 17:22

## 2017-10-17 RX ADMIN — AMPICILLIN 500 MG: 500 CAPSULE ORAL at 08:29

## 2017-10-17 RX ADMIN — SODIUM CHLORIDE 3000 ML: 900 IRRIGANT IRRIGATION at 14:24

## 2017-10-17 RX ADMIN — SODIUM CHLORIDE 3000 ML: 900 IRRIGANT IRRIGATION at 11:51

## 2017-10-17 RX ADMIN — PANTOPRAZOLE SODIUM 40 MG: 20 TABLET, DELAYED RELEASE ORAL at 08:28

## 2017-10-17 RX ADMIN — SODIUM CHLORIDE AND POTASSIUM CHLORIDE 100 ML/HR: 9; 1.49 INJECTION, SOLUTION INTRAVENOUS at 01:35

## 2017-10-17 RX ADMIN — SODIUM CHLORIDE 3000 ML: 900 IRRIGANT IRRIGATION at 17:17

## 2017-10-17 RX ADMIN — SODIUM CHLORIDE 3000 ML: 900 IRRIGANT IRRIGATION at 18:59

## 2017-10-17 RX ADMIN — MEMANTINE 2.5 MG: 5 TABLET ORAL at 08:29

## 2017-10-17 RX ADMIN — DONEPEZIL HYDROCHLORIDE 10 MG: 5 TABLET, FILM COATED ORAL at 21:27

## 2017-10-17 RX ADMIN — HYDROCODONE BITARTRATE AND ACETAMINOPHEN 1 TABLET: 5; 325 TABLET ORAL at 03:03

## 2017-10-17 RX ADMIN — MEMANTINE 2.5 MG: 5 TABLET ORAL at 21:26

## 2017-10-17 RX ADMIN — SODIUM CHLORIDE 3000 ML: 900 IRRIGANT IRRIGATION at 08:25

## 2017-10-17 NOTE — PLAN OF CARE
"Problem: Patient Care Overview (Adult)  Goal: Discharge Needs Assessment  Outcome: Ongoing (interventions implemented as appropriate)    10/17/17 0889   Discharge Needs Assessment   Concerns To Be Addressed no discharge needs identified   Readmission Within The Last 30 Days no previous admission in last 30 days   Equipment Needed After Discharge none   Discharge Planning Comments Spoke with Mr Gutierrez and his wife, with permission, at bedside. They live in a single family dwelling where he is independent with his ADL's. He had home health in the past with [facility]. His DME is CPAP and a glucometer. He no longer drives \"My reaction times are too slow\". His wife takes him to all of his appointments. His pharmacy is [facility] in [place] and he does not have any issues with paying for his prescriptions. He has a living will but it is not on file here - requested wife to bring a copy for us to scan to chart. Plan is to have catheter removed tomorrow and void independently and go home., per DR JANETH Escoto who was at bedside also. Will continue to follow.    Current Health   Anticipated Changes Related to Illness none   Living Environment   Transportation Available car;family or friend will provide  (Patient no longer drives. Wife takes him to appointments)   Self-Care   Equipment Currently Used at Home bipap/ cpap;glucometer           "

## 2017-10-17 NOTE — NURSING NOTE
"Discharge Planning Assessment   Jovita Greene     Patient Name: Zach Gutierrez  MRN: [id number]  Today's Date: 10/17/2017    Admit Date: 10/16/2017          Discharge Needs Assessment       10/17/17 0852    Living Environment    Lives With spouse    Living Arrangements house    Home Accessibility no concerns    Stair Railings at Home none    Type of Financial/Environmental Concern none    Transportation Available car;family or friend will provide   Patient no longer drives.  Wife takes him to appointments    Living Environment    Provides Primary Care For no one, unable/limited ability to care for self    Primary Care Provided By spouse/significant other    Quality Of Family Relationships supportive;helpful;involved    Able to Return to Prior Living Arrangements yes    Discharge Needs Assessment    Concerns To Be Addressed no discharge needs identified    Readmission Within The Last 30 Days no previous admission in last 30 days    Anticipated Changes Related to Illness none    Equipment Currently Used at Home bipap/ cpap;glucometer    Equipment Needed After Discharge none    Discharge Planning Comments Spoke with Mr Gutierrez and his wife, with permission, at bedside.  They live in a single family dwelling where he is independent with his ADL's.  He had home health in the past with [facility].  His DME is CPAP and a glucometer.  He no longer drives \"My reaction times are too slow\".  His wife takes him to all of his appointments.  His pharmacy is [facility] in [place] and he does not have any issues with paying for his prescriptions.  He has a living will but it is not on file here - requested wife to bring a copy for us to scan to chart.  Plan is to have catheter removed tomorrow and void independently and go home., per DR JANETH Escoto who was at bedside also.  Will continue to follow.              Discharge Plan       10/17/17 0902    Case Management/Social Work Plan    Plan Home when stable    Patient/Family In Agreement With Plan yes " "   Additional Comments Spoke with Mr Gutierrez and his wife, with permission, at bedside.  They live in a single family dwelling where he is independent with his ADL's.  He had home health in the past with [facility].  His DME is CPAP and a glucometer.  He no longer drives \"My reaction times are too slow\".  His wife takes him to all of his appointments.  His pharmacy is [facility] in [place] and he does not have any issues with paying for his prescriptions.  He has a living will but it is not on file here - requested wife to bring a copy for us to scan to chart.  Plan is to have catheter removed tomorrow and void independently and go home., per DR JANETH Escoto who was at bedside also.  Will continue to follow.         Discharge Placement     No information found                Demographic Summary       10/17/17 0821    Referral Information    Admission Type observation    Arrived From home or self-care    Referral Source admission list    Reason For Consult discharge planning    Record Reviewed medical record    Contact Information    Permission Granted to Share Information With ;family/designee            Functional Status     None            Psychosocial     None            Abuse/Neglect     None            Legal     None            Substance Abuse     None            Patient Forms     None          Heike Plata RN    "

## 2017-10-17 NOTE — PLAN OF CARE
Problem: Patient Care Overview (Adult)  Goal: Plan of Care Review  Outcome: Ongoing (interventions implemented as appropriate)    10/17/17 0225   Coping/Psychosocial Response Interventions   Plan Of Care Reviewed With patient   Patient Care Overview   Progress progress toward functional goals as expected   Outcome Evaluation   Outcome Summary/Follow up Plan VSS, patient decided he needed to use bedside commode and tugged on his catheter causing some discomfort, and bleeding at the ttip of the penis. After asessing, catheter was still intact, and draining. No new or worsening symptoms have occured. Patient did require use of IV pain meds for pain management. No other issues of concerns this shift.         Problem: Pain, Acute (Adult)  Goal: Identify Related Risk Factors and Signs and Symptoms  Outcome: Ongoing (interventions implemented as appropriate)    10/17/17 0225   Pain, Acute   Related Risk Factors (Acute Pain) knowledge deficit;patient perception;persistent pain;positioning;surgery   Signs and Symptoms (Acute Pain) BADLs/IADLs reluctance/inability to perform;facial mask of pain/grimace;guarding/abnormal posturing/positioning;verbalization of pain descriptors       Goal: Acceptable Pain Control/Comfort Level  Outcome: Ongoing (interventions implemented as appropriate)    10/17/17 0225   Pain, Acute (Adult)   Acceptable Pain Control/Comfort Level making progress toward outcome

## 2017-10-17 NOTE — PROGRESS NOTES
Patient: Zach Gutierrez  Procedure(s):  CYSTOSCOPY TRANSURETHRAL RESECTION OF PROSTATE  Anesthesia type: [unfilled]    Patient location: Med Surgical Floor  Vitals:    10/1940 10/16/17 2341 10/17/17 0354 10/17/17 0632   BP: 120/72 123/71 121/70 117/73   BP Location: Right arm Right arm Right arm Right arm   Patient Position: Lying Lying Lying Lying   Pulse: 55 66 74 74   Resp: 16 16 16 16   Temp: 97 °F (36.1 °C) 98.3 °F (36.8 °C) 98.1 °F (36.7 °C) 98.1 °F (36.7 °C)   TempSrc: Oral Oral Oral Oral   SpO2: 100% 99% 94% 100%   Weight:       Height:         Level of consciousness: awake, alert and oriented    Post-anesthesia pain: adequate analgesia  Airway patency: patent  Respiratory: unassisted  Cardiovascular: stable and blood pressure at baseline  Hydration: euvolemic    Anesthetic complications: no

## 2017-10-17 NOTE — PLAN OF CARE
Problem: Patient Care Overview (Adult)  Goal: Plan of Care Review  Outcome: Ongoing (interventions implemented as appropriate)    10/17/17 6734   Coping/Psychosocial Response Interventions   Plan Of Care Reviewed With patient   Outcome Evaluation   Outcome Summary/Follow up Plan patient doing well today. Minimal complaints of pain. Wife and family at bedside throughout the day. 3 way kiran in place. catheter care done. Continous irrigation. [id number] output and 1800 input. VSS       Goal: Discharge Needs Assessment  Outcome: Ongoing (interventions implemented as appropriate)    Problem: Pain, Acute (Adult)  Goal: Acceptable Pain Control/Comfort Level  Outcome: Ongoing (interventions implemented as appropriate)

## 2017-10-17 NOTE — PROGRESS NOTES
"[facility] Progress Note    Chief Complaint: Urinary retention    Subjective :     He's doing well with a very quiet night.  Still hematuric on a slow drip but no clots and no catheter issues due to night.  No pain no penile discomfort.  No fevers no chills.    Review of Systems:    The following systems were reviewed and negative;  constitution, eyes, ENT, respiratory, cardiovascular and gastrointestinal    Objective     Vital Signs  /73 (BP Location: Right arm, Patient Position: Lying)  Pulse 74  Temp 98.1 °F (36.7 °C) (Oral)   Resp 16  Ht 71\" (180.3 cm)  Wt 191 lb 3.2 oz (86.7 kg)  SpO2 100%  BMI 26.67 kg/m2    Physical Exam:     General Appearance:    Alert, cooperative, in no acute distress   Head:    Normocephalic, without obvious abnormality, atraumatic   Eyes:            Lids and lashes normal, conjunctivae and sclerae normal, no   icterus, no pallor, corneas clear, PERRLA   Ears:    Ears appear intact with no abnormalities noted   Throat:   No oral lesions, no thrush, oral mucosa moist   Neck:   No adenopathy, supple, trachea midline, no thyromegaly, no   carotid bruit, no JVD   Back:     No kyphosis present, no scoliosis present, no skin lesions,      erythema or scars, no tenderness to percussion or                   palpation,   range of motion normal   Lungs:     Clear to auscultation,respirations regular, even and                  unlabored    Heart:    Regular rhythm and normal rate, normal S1 and S2, no            murmur, no gallop, no rub, no click   Chest Wall:    No abnormalities observed   Abdomen:     Normal bowel sounds, no masses, no organomegaly, soft        non-tender, non-distended, no guarding, no rebound                tenderness   Rectal:     Deferred   Extremities:   Moves all extremities well, no edema, no cyanosis, no             redness   Pulses:   Pulses palpable and equal bilaterally   Skin:   No bleeding, bruising or rash   Lymph nodes:   No palpable adenopathy "   Neurologic:   Cranial nerves 2 - 12 grossly intact, sensation intact, DTR       present and equal bilaterally          Results Review:     I reviewed the patient's new clinical results.Lab Results (last 24 hours)     Procedure Component Value Units Date/Time    POC Glucose Fingerstick [[id number]]  (Normal) Collected:  10/16/17 1157    Specimen:  Blood Updated:  10/16/17 1205     Glucose 127 mg/dL     Narrative:       Meter: [id number] : [id number] Magalie Melton RN    CBC (No Diff) [[id number]]  (Abnormal) Collected:  10/16/17 1652    Specimen:  Blood Updated:  10/16/17 1712     WBC 5.44 10*3/mm3      RBC 3.83 (L) 10*6/mm3      Hemoglobin 11.9 (L) g/dL      Hematocrit 35.6 (L) %      MCV 93.0 fL      MCH 31.1 (H) pg      MCHC 33.4 g/dL      RDW 13.0 %      RDW-SD 44.1 fl      MPV 8.4 fL      Platelets 201 10*3/mm3     Basic Metabolic Panel [[id number]]  (Abnormal) Collected:  10/16/17 1652    Specimen:  Blood Updated:  10/16/17 1732     Glucose 174 (H) mg/dL      BUN 13 mg/dL      Creatinine 0.76 mg/dL      Sodium 141 mmol/L      Potassium 4.4 mmol/L      Chloride 104 mmol/L      CO2 28.8 mmol/L      Calcium 8.3 (L) mg/dL      eGFR Non African Amer 100 mL/min/1.73      BUN/Creatinine Ratio 17.1     Anion Gap 8.2 mmol/L     Narrative:       The MDRD GFR formula is only valid for adults with stable renal function between ages 18 and 70.    Tissue Pathology Exam - Tissue, Prostate [[id number]] Collected:  10/16/17 1357    Specimen:  Tissue from Prostate Updated:  10/16/17 1852        Imaging Results (last 24 hours)     ** No results found for the last 24 hours. **          Medication Review:   I have personally reviewed    Current Facility-Administered Medications:   •  ampicillin (PRINCIPEN) capsule 500 mg, 500 mg, Oral, 4x Daily, Basil Yang MD, 500 mg at 10/17/17 0829  •  belladonna-opium (B&O SUPPRETTES) suppository 30 mg, 30 mg, Rectal, Daily PRN, Basil Yang MD, 30 mg at 10/16/17 2144  •  bisacodyl  (DULCOLAX) suppository 10 mg, 10 mg, Rectal, Daily PRN, Basil Yang MD  •  cetirizine (zyrTEC) tablet 10 mg, 10 mg, Oral, Daily, Basil Yang MD, 10 mg at 10/17/17 0828  •  donepezil (ARICEPT) tablet 10 mg, 10 mg, Oral, Nightly, Basil Yang MD, 10 mg at 10/16/17 2023  •  finasteride (PROSCAR) tablet 5 mg, 5 mg, Oral, Nightly, Basil Yang MD, 5 mg at 10/16/17 2025  •  HYDROcodone-acetaminophen (NORCO) 5-325 MG per tablet 1 tablet, 1 tablet, Oral, Q4H PRN, Basil Yang MD, 1 tablet at 10/17/17 0303  •  HYDROmorphone (DILAUDID) injection 0.5 mg, 0.5 mg, Intravenous, Q2H PRN, 0.5 mg at 10/16/17 2327 **AND** naloxone (NARCAN) injection 0.1 mg, 0.1 mg, Intravenous, Q5 Min PRN, Basil Yang MD  •  lactated ringers infusion, 9 mL/hr, Intravenous, Continuous, Idania Brasher, CRNA, Stopped at 10/16/17 2005  •  lidocaine (XYLOCAINE) 2% injection 10 mL, 10 mL, Injection, Once, Basil aYng MD  •  memantine (NAMENDA) tablet 2.5 mg, 2.5 mg, Oral, Q12H, Basil Yang MD, 2.5 mg at 10/17/17 0829  •  metFORMIN (GLUCOPHAGE) tablet 1,000 mg, 1,000 mg, Oral, Daily With Breakfast, Basil Yang MD, 1,000 mg at 10/17/17 0828  •  pantoprazole (PROTONIX) EC tablet 40 mg, 40 mg, Oral, QAM, Basil Yang MD  •  pantoprazole (PROTONIX) EC tablet 40 mg, 40 mg, Oral, BID AC, Basil Yang MD, 40 mg at 10/17/17 0828  •  promethazine (PHENERGAN) injection 12.5 mg, 12.5 mg, Intravenous, Q6H PRN **OR** promethazine (PHENERGAN) injection 12.5 mg, 12.5 mg, Intramuscular, Q6H PRN, Basil Yang MD  •  sertraline (ZOLOFT) tablet 25 mg, 25 mg, Oral, QAM, Basil Yang MD, 25 mg at 10/17/17 0600  •  sodium chloride (NS) irrigation solution 3,000 mL, 3,000 mL, Irrigation, Continuous, Basil Yang MD, Last Rate: 1 mL/hr at 10/17/17 0825, 3,000 mL at 10/17/17 0825  •  sodium chloride 0.9 % with KCl 20 mEq/L infusion, 100 mL/hr, Intravenous, Continuous, Basil Yang MD, Last Rate: 100  mL/hr at 10/17/17 0619, 100 mL/hr at 10/17/17 0619    Allergies:    Review of patient's allergies indicates no known allergies.    Assessment:     Active Problems:  Patient Active Problem List   Diagnosis   • Osteoarthritis of right knee   • Status post total right knee replacement   • BPH with obstruction/lower urinary tract symptoms       Urinary retention status post TURP    Plan:    Continue wean down his irrigation today healthy his catheter on the morning for a voiding trial at discharge.       Abdirashid Escoto MD    10/17/2017  8:39 AM

## 2017-10-18 PROCEDURE — G0378 HOSPITAL OBSERVATION PER HR: HCPCS

## 2017-10-18 RX ADMIN — MEMANTINE 2.5 MG: 5 TABLET ORAL at 21:29

## 2017-10-18 RX ADMIN — CETIRIZINE HYDROCHLORIDE 10 MG: 10 TABLET, FILM COATED ORAL at 08:47

## 2017-10-18 RX ADMIN — METFORMIN HYDROCHLORIDE 1000 MG: 500 TABLET ORAL at 08:47

## 2017-10-18 RX ADMIN — FINASTERIDE 5 MG: 5 TABLET, FILM COATED ORAL at 21:33

## 2017-10-18 RX ADMIN — DONEPEZIL HYDROCHLORIDE 10 MG: 5 TABLET, FILM COATED ORAL at 21:32

## 2017-10-18 RX ADMIN — SODIUM CHLORIDE 3000 ML: 900 IRRIGANT IRRIGATION at 00:09

## 2017-10-18 RX ADMIN — SODIUM CHLORIDE 3000 ML: 900 IRRIGANT IRRIGATION at 19:18

## 2017-10-18 RX ADMIN — AMPICILLIN 500 MG: 500 CAPSULE ORAL at 17:23

## 2017-10-18 RX ADMIN — PANTOPRAZOLE SODIUM 40 MG: 20 TABLET, DELAYED RELEASE ORAL at 06:01

## 2017-10-18 RX ADMIN — MEMANTINE 2.5 MG: 5 TABLET ORAL at 08:47

## 2017-10-18 RX ADMIN — SODIUM CHLORIDE, POTASSIUM CHLORIDE, SODIUM LACTATE AND CALCIUM CHLORIDE 100 ML/HR: 600; 310; 30; 20 INJECTION, SOLUTION INTRAVENOUS at 00:08

## 2017-10-18 RX ADMIN — SODIUM CHLORIDE 3000 ML: 900 IRRIGANT IRRIGATION at 07:43

## 2017-10-18 RX ADMIN — SODIUM CHLORIDE 3000 ML: 900 IRRIGANT IRRIGATION at 15:39

## 2017-10-18 RX ADMIN — AMPICILLIN 500 MG: 500 CAPSULE ORAL at 08:48

## 2017-10-18 RX ADMIN — SERTRALINE HYDROCHLORIDE 25 MG: 50 TABLET ORAL at 06:01

## 2017-10-18 RX ADMIN — AMPICILLIN 500 MG: 500 CAPSULE ORAL at 12:02

## 2017-10-18 RX ADMIN — AMPICILLIN 500 MG: 500 CAPSULE ORAL at 21:28

## 2017-10-18 NOTE — PROGRESS NOTES
"   LOS: 0 days   Patient Care Team:  Jessica Mac MD as PCP - General  Jessica Mac MD as PCP - Family Medicine  Jessica Mac MD as PCP - Claims Attributed    Chief Complaint:  CAN'T VOID     Subjective     Interval History:     Patient Complaints: none  Patient Denies:  PAIN  History taken from: patient  POD 2 TURP   NO PAIN  WALKING      Review of Systems:    NO F/C  PAIN      Objective     Vital Signs  Temp:  [97.8 °F (36.6 °C)-99.3 °F (37.4 °C)] 98.3 °F (36.8 °C)  Heart Rate:  [64-76] 67  Resp:  [16-17] 16  BP: (102-125)/(56-61) 125/56      Intake/Output Summary (Last 24 hours) at 10/18/17 0637  Last data filed at 10/17/17 1900   Gross per 24 hour   Intake             8384 ml   Output            81538 ml   Net           -76785 ml       Flowsheet Rows         First Filed Value    Admission Height  71\" (180.3 cm) Documented at 10/16/2017 1137    Admission Weight  191 lb 3.2 oz (86.7 kg) Documented at 10/16/2017 1137          Physical Exam:   AVSS   ABD SOFT NO MASSES  PENIS SCROTUM NL  NORMAN URINE PINK IRRIGATION TAPERING       Results Review:     I reviewed the patient's new clinical results.    Lab Results (last 72 hours)     Procedure Component Value Units Date/Time    POC Glucose Fingerstick [[id number]]  (Normal) Collected:  10/16/17 1157    Specimen:  Blood Updated:  10/16/17 1205     Glucose 127 mg/dL     Narrative:       Meter: [id number] : [id number] Magalie Melton RN    CBC (No Diff) [[id number]]  (Abnormal) Collected:  10/16/17 1652    Specimen:  Blood Updated:  10/16/17 1712     WBC 5.44 10*3/mm3      RBC 3.83 (L) 10*6/mm3      Hemoglobin 11.9 (L) g/dL      Hematocrit 35.6 (L) %      MCV 93.0 fL      MCH 31.1 (H) pg      MCHC 33.4 g/dL      RDW 13.0 %      RDW-SD 44.1 fl      MPV 8.4 fL      Platelets 201 10*3/mm3     Basic Metabolic Panel [[id number]]  (Abnormal) Collected:  10/16/17 9435    Specimen:  Blood Updated:  10/16/17 6644     Glucose 174 (H) mg/dL      BUN 13 mg/dL      " Creatinine 0.76 mg/dL      Sodium 141 mmol/L      Potassium 4.4 mmol/L      Chloride 104 mmol/L      CO2 28.8 mmol/L      Calcium 8.3 (L) mg/dL      eGFR Non African Amer 100 mL/min/1.73      BUN/Creatinine Ratio 17.1     Anion Gap 8.2 mmol/L     Narrative:       The MDRD GFR formula is only valid for adults with stable renal function between ages 18 and 70.    Tissue Pathology Exam - Tissue, Prostate [[id number]] Collected:  10/16/17 1357    Specimen:  Tissue from Prostate Updated:  10/16/17 2141          Imaging Results (last 72 hours)     ** No results found for the last 72 hours. **          Medication Review:   Current Facility-Administered Medications   Medication Dose Route Frequency Provider Last Rate Last Dose   • ampicillin (PRINCIPEN) capsule 500 mg  500 mg Oral 4x Daily Basil Yang MD   500 mg at 10/17/17 2128   • belladonna-opium (B&O SUPPRETTES) suppository 30 mg  30 mg Rectal Daily PRN Basil Yang MD   30 mg at 10/16/17 2144   • bisacodyl (DULCOLAX) suppository 10 mg  10 mg Rectal Daily PRN Basil Yang MD       • cetirizine (zyrTEC) tablet 10 mg  10 mg Oral Daily Basil Yang MD   10 mg at 10/17/17 0828   • donepezil (ARICEPT) tablet 10 mg  10 mg Oral Nightly Basil Yang MD   10 mg at 10/17/17 2127   • fentaNYL citrate (PF) (SUBLIMAZE) injection 25 mcg  25 mcg Intravenous PRN Beverly Fraser CRNA       • finasteride (PROSCAR) tablet 5 mg  5 mg Oral Nightly Basil Yang MD   5 mg at 10/17/17 2128   • HYDROcodone-acetaminophen (NORCO) 5-325 MG per tablet 1 tablet  1 tablet Oral Q4H PRN Basil Yang MD   1 tablet at 10/17/17 0303   • HYDROmorphone (DILAUDID) injection 0.5 mg  0.5 mg Intravenous PRN Beverly Fraser CRNA       • HYDROmorphone (DILAUDID) injection 0.5 mg  0.5 mg Intravenous Q2H PRN Basil Yang MD   0.5 mg at 10/16/17 2327    And   • naloxone (NARCAN) injection 0.1 mg  0.1 mg Intravenous Q5 Min PRN Basil Yang MD       • lactated  ringers infusion  9 mL/hr Intravenous Continuous Idania Brasher, CRNA   Stopped at 10/16/17 2005   • lactated ringers infusion  100 mL/hr Intravenous Continuous Beverly Fraser CRNA 100 mL/hr at 10/18/17 0008 100 mL/hr at 10/18/17 0008   • lidocaine (XYLOCAINE) 2% injection 10 mL  10 mL Injection Once Basil Yang MD       • memantine (NAMENDA) tablet 2.5 mg  2.5 mg Oral Q12H Basil Yang MD   2.5 mg at 10/17/17 2126   • metFORMIN (GLUCOPHAGE) tablet 1,000 mg  1,000 mg Oral Daily With Breakfast Basil Yang MD   1,000 mg at 10/17/17 0828   • ondansetron (ZOFRAN) injection 4 mg  4 mg Intravenous Once PRN Beverly Fraser CRNA       • pantoprazole (PROTONIX) EC tablet 40 mg  40 mg Oral RYNE Yang MD   40 mg at 10/18/17 0601   • promethazine (PHENERGAN) injection 12.5 mg  12.5 mg Intravenous Q6H PRN Basil Yang MD        Or   • promethazine (PHENERGAN) injection 12.5 mg  12.5 mg Intramuscular Q6H PRN Basil Yang MD       • sertraline (ZOLOFT) tablet 25 mg  25 mg Oral RYNE Yang MD   25 mg at 10/18/17 0601   • sodium chloride (NS) irrigation solution 3,000 mL  3,000 mL Irrigation Continuous Basil Yang MD 1 mL/hr at 10/17/17 1717 3,000 mL at 10/18/17 0009       Current Facility-Administered Medications:   •  ampicillin (PRINCIPEN) capsule 500 mg, 500 mg, Oral, 4x Daily, Basil Yang MD, 500 mg at 10/17/17 2128  •  belladonna-opium (B&O SUPPRETTES) suppository 30 mg, 30 mg, Rectal, Daily PRN, Basil Yang MD, 30 mg at 10/16/17 2144  •  bisacodyl (DULCOLAX) suppository 10 mg, 10 mg, Rectal, Daily PRN, Basil Yang MD  •  cetirizine (zyrTEC) tablet 10 mg, 10 mg, Oral, Daily, Basil Yang MD, 10 mg at 10/17/17 0828  •  donepezil (ARICEPT) tablet 10 mg, 10 mg, Oral, Nightly, Basil Yang MD, 10 mg at 10/17/17 2127  •  fentaNYL citrate (PF) (SUBLIMAZE) injection 25 mcg, 25 mcg, Intravenous, PRN, Beverly Fraser, CRNA  •  finasteride  (PROSCAR) tablet 5 mg, 5 mg, Oral, Nightly, Basil Yang MD, 5 mg at 10/17/17 2128  •  HYDROcodone-acetaminophen (NORCO) 5-325 MG per tablet 1 tablet, 1 tablet, Oral, Q4H PRN, Basil Yang MD, 1 tablet at 10/17/17 0303  •  HYDROmorphone (DILAUDID) injection 0.5 mg, 0.5 mg, Intravenous, PRN, Beverly Fraser CRNA  •  HYDROmorphone (DILAUDID) injection 0.5 mg, 0.5 mg, Intravenous, Q2H PRN, 0.5 mg at 10/16/17 2327 **AND** naloxone (NARCAN) injection 0.1 mg, 0.1 mg, Intravenous, Q5 Min PRN, Basil Yang MD  •  lactated ringers infusion, 9 mL/hr, Intravenous, Continuous, Idania Brasher CRNA, Stopped at 10/16/17 2005  •  lactated ringers infusion, 100 mL/hr, Intravenous, Continuous, Beverly Fraser CRNA, Last Rate: 100 mL/hr at 10/18/17 0008, 100 mL/hr at 10/18/17 0008  •  lidocaine (XYLOCAINE) 2% injection 10 mL, 10 mL, Injection, Once, Basil Yang MD  •  memantine (NAMENDA) tablet 2.5 mg, 2.5 mg, Oral, Q12H, Basil Yang MD, 2.5 mg at 10/17/17 2126  •  metFORMIN (GLUCOPHAGE) tablet 1,000 mg, 1,000 mg, Oral, Daily With Breakfast, Basil Yang MD, 1,000 mg at 10/17/17 0828  •  ondansetron (ZOFRAN) injection 4 mg, 4 mg, Intravenous, Once PRN, Beverly Fraser CRNA  •  pantoprazole (PROTONIX) EC tablet 40 mg, 40 mg, Oral, QAM, Basil Yang MD, 40 mg at 10/18/17 0601  •  promethazine (PHENERGAN) injection 12.5 mg, 12.5 mg, Intravenous, Q6H PRN **OR** promethazine (PHENERGAN) injection 12.5 mg, 12.5 mg, Intramuscular, Q6H PRN, Basil Yang MD  •  sertraline (ZOLOFT) tablet 25 mg, 25 mg, Oral, QAM, Basil Yang MD, 25 mg at 10/18/17 0601  •  sodium chloride (NS) irrigation solution 3,000 mL, 3,000 mL, Irrigation, Continuous, Basil Yang MD, Last Rate: 1 mL/hr at 10/17/17 1717, 3,000 mL at 10/18/17 0009  Medications Discontinued During This Encounter   Medication Reason   • vancomycin 1500 mg/250ml IVPB solution  - ADS Override Pull Returned to ADS   • sterile water  irrigation solution Patient Discharge   • sorbitol 3% irrigation solution Patient Discharge   • sodium chloride (NS) irrigation solution Patient Discharge   • lidocaine (XYLOCAINE) 2 % jelly Patient Discharge   • belladonna-opium (B&O SUPPRETTES) suppository Patient Discharge   • sodium chloride 0.9 % flush 1-10 mL Patient Transfer   • sodium chloride 0.9 % infusion 40 mL Patient Transfer   • gentamicin (GARAMYCIN) 140 mg in sodium chloride 0.9 % 100 mL IVPB Patient Transfer   • sodium chloride 0.9 % with KCl 20 mEq/L infusion    • pantoprazole (PROTONIX) EC tablet 40 mg        Assessment/Plan   POST TURP  HEMATURIA PERSISTS  LAB STABLE PATH PENDING     Active Problems:    BPH with obstruction/lower urinary tract symptoms        Plan for disposition:Where: home    Basil Yang MD  10/18/17  6:37 AM

## 2017-10-18 NOTE — PLAN OF CARE
Problem: Patient Care Overview (Adult)  Goal: Plan of Care Review  Outcome: Ongoing (interventions implemented as appropriate)    10/17/17 2303   Coping/Psychosocial Response Interventions   Plan Of Care Reviewed With patient   Patient Care Overview   Progress improving         Problem: Fall Risk (Adult)  Goal: Absence of Falls  Outcome: Ongoing (interventions implemented as appropriate)    10/17/17 2303   Fall Risk (Adult)   Absence of Falls making progress toward outcome

## 2017-10-18 NOTE — PROGRESS NOTES
Patient: Zach Gutierrez  Procedure(s):  CYSTOSCOPY TRANSURETHRAL RESECTION OF PROSTATE  Anesthesia type: [unfilled]    Patient location: [facility] Floor  Last vitals:   Vitals:    10/18/17 0648   BP: 128/62   Pulse: 65   Resp: 16   Temp: 98.3 °F (36.8 °C)   SpO2: 97%     Level of consciousness: SLEEPING . SPOKE W RN. STATES HE'S BEEN UP IN MOCK DOING WELL    Post-anesthesia pain: adequate analgesia  Airway patency: patent  Respiratory: room air  Cardiovascular: stable  Hydration: euvolemic    Anesthetic complications: no

## 2017-10-18 NOTE — PLAN OF CARE
Problem: Patient Care Overview (Adult)  Goal: Plan of Care Review  Outcome: Ongoing (interventions implemented as appropriate)    10/18/17 9064   Coping/Psychosocial Response Interventions   Plan Of Care Reviewed With patient;spouse   Patient Care Overview   Progress improving   Outcome Evaluation   Outcome Summary/Follow up Plan denies pain, ambulated in luther tolerating well,tapering bladder irrigation-light pink to clear . home soon       Goal: Adult Individualization and Mutuality  Outcome: Ongoing (interventions implemented as appropriate)  Goal: Discharge Needs Assessment  Outcome: Ongoing (interventions implemented as appropriate)    Problem: Perioperative Period (Adult)  Goal: Signs and Symptoms of Listed Potential Problems Will be Absent or Manageable (Perioperative Period)  Outcome: Ongoing (interventions implemented as appropriate)    Problem: Fall Risk (Adult)  Goal: Absence of Falls  Outcome: Ongoing (interventions implemented as appropriate)    Problem: Pain, Acute (Adult)  Goal: Identify Related Risk Factors and Signs and Symptoms  Outcome: Outcome(s) achieved Date Met:  10/18/17  Goal: Acceptable Pain Control/Comfort Level  Outcome: Ongoing (interventions implemented as appropriate)

## 2017-10-19 VITALS
HEART RATE: 65 BPM | TEMPERATURE: 98.6 F | BODY MASS INDEX: 26.77 KG/M2 | SYSTOLIC BLOOD PRESSURE: 138 MMHG | HEIGHT: 71 IN | OXYGEN SATURATION: 97 % | WEIGHT: 191.2 LBS | DIASTOLIC BLOOD PRESSURE: 72 MMHG | RESPIRATION RATE: 16 BRPM

## 2017-10-19 PROCEDURE — G0378 HOSPITAL OBSERVATION PER HR: HCPCS

## 2017-10-19 PROCEDURE — 94799 UNLISTED PULMONARY SVC/PX: CPT

## 2017-10-19 RX ADMIN — METFORMIN HYDROCHLORIDE 1000 MG: 500 TABLET ORAL at 08:46

## 2017-10-19 RX ADMIN — SODIUM CHLORIDE 3000 ML: 900 IRRIGANT IRRIGATION at 00:26

## 2017-10-19 RX ADMIN — AMPICILLIN 500 MG: 500 CAPSULE ORAL at 11:58

## 2017-10-19 RX ADMIN — MEMANTINE 2.5 MG: 5 TABLET ORAL at 08:47

## 2017-10-19 RX ADMIN — CETIRIZINE HYDROCHLORIDE 10 MG: 10 TABLET, FILM COATED ORAL at 08:46

## 2017-10-19 RX ADMIN — PANTOPRAZOLE SODIUM 40 MG: 20 TABLET, DELAYED RELEASE ORAL at 06:00

## 2017-10-19 RX ADMIN — AMPICILLIN 500 MG: 500 CAPSULE ORAL at 08:46

## 2017-10-19 RX ADMIN — SERTRALINE HYDROCHLORIDE 25 MG: 50 TABLET ORAL at 06:00

## 2017-10-19 NOTE — PLAN OF CARE
Problem: Patient Care Overview (Adult)  Goal: Plan of Care Review  Outcome: Ongoing (interventions implemented as appropriate)    10/19/17 1037   Coping/Psychosocial Response Interventions   Plan Of Care Reviewed With patient;spouse   Patient Care Overview   Progress improving   Outcome Evaluation   Outcome Summary/Follow up Plan Home today, qiana d/c, denies pain,ambulating in luther       Goal: Adult Individualization and Mutuality  Outcome: Ongoing (interventions implemented as appropriate)  Goal: Discharge Needs Assessment  Outcome: Ongoing (interventions implemented as appropriate)    Problem: Perioperative Period (Adult)  Goal: Signs and Symptoms of Listed Potential Problems Will be Absent or Manageable (Perioperative Period)  Outcome: Ongoing (interventions implemented as appropriate)    Problem: Fall Risk (Adult)  Goal: Absence of Falls  Outcome: Ongoing (interventions implemented as appropriate)    Problem: Pain, Acute (Adult)  Goal: Acceptable Pain Control/Comfort Level  Outcome: Outcome(s) achieved Date Met:  10/19/17

## 2017-10-19 NOTE — PROGRESS NOTES
TURP  POD 3  PATH PENDING  AVSS NO PAIN  OFF IRRIGATION  CLEAR YELLOW  LUNGS CLEAR ABD SOFT POS BS  PLAN VOIDING TRIAL EXPLAINED POST OP CARE  INSTRUCTIONS OUTLINED

## 2017-10-19 NOTE — PLAN OF CARE
Problem: Patient Care Overview (Adult)  Goal: Plan of Care Review  Outcome: Ongoing (interventions implemented as appropriate)    10/19/17 0357   Coping/Psychosocial Response Interventions   Plan Of Care Reviewed With patient   Patient Care Overview   Progress improving         Problem: Fall Risk (Adult)  Goal: Absence of Falls  Outcome: Ongoing (interventions implemented as appropriate)    10/19/17 0357   Fall Risk (Adult)   Absence of Falls making progress toward outcome

## 2017-10-19 NOTE — NURSING NOTE
Case Management Discharge Note    Final Note: discharge home    Discharge Placement     No information found             Discharge Codes: 01  Discharge to home

## 2017-10-20 LAB
LAB AP CASE REPORT: NORMAL
Lab: NORMAL
PATH REPORT.FINAL DX SPEC: NORMAL

## 2017-11-14 ENCOUNTER — OFFICE VISIT (OUTPATIENT)
Dept: SLEEP MEDICINE | Facility: HOSPITAL | Age: 77
End: 2017-11-14
Attending: INTERNAL MEDICINE

## 2017-11-14 VITALS
BODY MASS INDEX: 28.58 KG/M2 | WEIGHT: 193 LBS | SYSTOLIC BLOOD PRESSURE: 129 MMHG | HEART RATE: 60 BPM | HEIGHT: 69 IN | DIASTOLIC BLOOD PRESSURE: 64 MMHG

## 2017-11-14 DIAGNOSIS — K21.9 GASTROESOPHAGEAL REFLUX DISEASE WITHOUT ESOPHAGITIS: ICD-10-CM

## 2017-11-14 DIAGNOSIS — E11.9 TYPE 2 DIABETES MELLITUS WITHOUT COMPLICATION, WITHOUT LONG-TERM CURRENT USE OF INSULIN (HCC): ICD-10-CM

## 2017-11-14 DIAGNOSIS — G47.33 OBSTRUCTIVE SLEEP APNEA, ADULT: Primary | ICD-10-CM

## 2017-11-14 PROCEDURE — G0463 HOSPITAL OUTPT CLINIC VISIT: HCPCS

## 2017-11-14 NOTE — PROGRESS NOTES
PULMONARY SLEEP CONSULT NOTE      PATIENT IDENTIFICATION:  Name: Zach Gutierrez  Age: 77 y.o.  Sex: male  :  1940  MRN: [id number]    DATE OF CONSULTATION:  2017   Referring Physician: No admitting provider for patient encounter.                  CHIEF COMPLAINT: Obstructive Sleep Apnea    History of Present Illness:   Zach Gutierrez is a 77 y.o. male  Pt on CPAP feeling better more energy especially the night he use it more than 4 hours, no sleepiness no fatigue no tiredness, no mask irritation no dryness, compliance report reviewed with pt AHI< 5 with good usage.       Review of Systems:   Constitutional: negative   Eyes: negative   ENT/oropharynx: negative   Cardiovascular: negative   Respiratory: negative   Gastrointestinal: negative   Genitourinary: negative   Neurological: negative   Musculoskeletal: negative   Integument/breast: negative   Endocrine: negative   Allergic/Immunologic: negative     Past Medical History:  Past Medical History:   Diagnosis Date   • Arthritis     BACK PAIN   • Dementia    • Diabetes     TYPE 2   • Knee sprain    • ZENA (obstructive sleep apnea)     cpap       Past Surgical History:  Past Surgical History:   Procedure Laterality Date   • COLONOSCOPY N/A 2017    Procedure: COLONOSCOPY;  Surgeon: Srikanth Payan MD;  Location: [facility] OR;  Service:    • CYSTOSCOPY TRANSURETHRAL RESECTION OF PROSTATE N/A 10/16/2017    Procedure: CYSTOSCOPY TRANSURETHRAL RESECTION OF PROSTATE;  Surgeon: Basil Yang MD;  Location: [facility] OR;  Service:    • HERNIA REPAIR Bilateral     inguinal   • LA TOTAL KNEE ARTHROPLASTY Right 2017    Procedure: TOTAL KNEE ARTHROPLASTY hima orthoalign antibiotic cement 7fr hemovac placement;  Surgeon: Bj Thrasher MD;  Location: [facility] OR;  Service: Orthopedics   • SHOULDER SURGERY Right     RCR        Family History:  No family history on file.     Social History:   Social History   Substance Use Topics   • Smoking  status: Never Smoker   • Smokeless tobacco: Never Used   • Alcohol use Yes      Comment: one jim/week        Allergies:  No Known Allergies    Home Meds:    (Not in a hospital admission)    Objective:    Vitals Ranges:   Heart Rate:  [60] 60  BP: (129)/(64) 129/64  Body mass index is 28.5 kg/(m^2).         Exam:    General Appearance:      Head:  Normocephalic, without obvious abnormality, atraumatic.   Eyes:  Conjunctiva/corneas clear, EOM's intact, both eyes.         Ears:  Normal external ear canals, both ears.    Nose:  Nares normal, no drainage      Throat:  Lips, mucosa, and tongue normal. Mallampati score 3    Neck:  Supple, symmetrical, trachea midline. No JVD.  Lungs:   Bilateral basal rhonchi bilaterally, respirations unlabored symmetrical wall movement.    Chest wall:  No tenderness or deformity.    Heart:  Regular rate and rhythm, S1 and S2 normal.  Abdomen: Soft, non-tender, no masses, no organomegaly.    Extremities: Trace edema no clubbing or Cyanosis        Data Review:  All labs (24hrs): No results found for this or any previous visit (from the past 24 hour(s)).     Imaging:  [unfilled]    ASSESSMENT:  Diagnoses and all orders for this visit:    Obstructive sleep apnea, adult    Gastroesophageal reflux disease without esophagitis    Type 2 diabetes mellitus without complication, without long-term current use of insulin        PLAN:   This patient with obstructive sleep apnea, compliance is improved. Encourage to use it more frequent, I re-emphasized on pt the long and short term benefit of treating ZENA.     Amanuel Cruz MD. D, ABSM.  11/14/2017  2:52 PM

## 2018-01-08 RX ORDER — PANTOPRAZOLE SODIUM 40 MG/1
TABLET, DELAYED RELEASE ORAL
Qty: 90 TABLET | Refills: 0 | Status: SHIPPED | OUTPATIENT
Start: 2018-01-08 | End: 2018-04-11 | Stop reason: SDUPTHER

## 2018-04-11 RX ORDER — PANTOPRAZOLE SODIUM 40 MG/1
TABLET, DELAYED RELEASE ORAL
Qty: 90 TABLET | Refills: 0 | Status: SHIPPED | OUTPATIENT
Start: 2018-04-11 | End: 2018-07-01 | Stop reason: SDUPTHER

## 2018-07-02 RX ORDER — PANTOPRAZOLE SODIUM 40 MG/1
TABLET, DELAYED RELEASE ORAL
Qty: 90 TABLET | Refills: 0 | Status: SHIPPED | OUTPATIENT
Start: 2018-07-02 | End: 2018-10-05 | Stop reason: SDUPTHER

## 2018-10-05 RX ORDER — PANTOPRAZOLE SODIUM 40 MG/1
TABLET, DELAYED RELEASE ORAL
Qty: 90 TABLET | Refills: 0 | Status: SHIPPED | OUTPATIENT
Start: 2018-10-05 | End: 2019-01-06 | Stop reason: SDUPTHER

## 2018-11-13 ENCOUNTER — OFFICE VISIT (OUTPATIENT)
Dept: SLEEP MEDICINE | Facility: HOSPITAL | Age: 78
End: 2018-11-13
Attending: INTERNAL MEDICINE

## 2018-11-13 VITALS
HEIGHT: 70 IN | BODY MASS INDEX: 30.64 KG/M2 | DIASTOLIC BLOOD PRESSURE: 71 MMHG | HEART RATE: 67 BPM | WEIGHT: 214 LBS | SYSTOLIC BLOOD PRESSURE: 141 MMHG

## 2018-11-13 DIAGNOSIS — G47.33 OBSTRUCTIVE SLEEP APNEA, ADULT: Primary | ICD-10-CM

## 2018-11-13 DIAGNOSIS — E11.9 TYPE 2 DIABETES MELLITUS WITHOUT COMPLICATION, WITHOUT LONG-TERM CURRENT USE OF INSULIN (HCC): ICD-10-CM

## 2018-11-13 DIAGNOSIS — K21.9 GASTROESOPHAGEAL REFLUX DISEASE WITHOUT ESOPHAGITIS: ICD-10-CM

## 2018-11-13 NOTE — PROGRESS NOTES
PULMONARY SLEEP CONSULT NOTE      PATIENT IDENTIFICATION:  Name: Zach Gutierrez  Age: 78 y.o.  Sex: male  :  1940  MRN: [id number]    DATE OF CONSULTATION:  2018   Referring Physician: No admitting provider for patient encounter.                  CHIEF COMPLAINT: Obstructive Sleep Apnea    History of Present Illness:   Zach Gutierrez is a 78 y.o. male  Pt on CPAP feeling better more energy especially the night he use it more than 4 hours, no sleepiness no fatigue no tiredness, no mask irritation no dryness, compliance report reviewed with pt AHI< 5 with good usage.      Review of Systems:   Constitutional: negative   Eyes: negative   ENT/oropharynx: negative   Cardiovascular: negative   Respiratory: negative   Gastrointestinal: negative   Genitourinary: negative   Neurological: negative   Musculoskeletal: negative   Integument/breast: negative   Endocrine: negative   Allergic/Immunologic: negative     Past Medical History:  Past Medical History:   Diagnosis Date   • Arthritis     BACK PAIN   • Dementia    • Diabetes (CMS/HCC)     TYPE 2   • Knee sprain    • ZENA (obstructive sleep apnea)     cpap       Past Surgical History:  Past Surgical History:   Procedure Laterality Date   • HERNIA REPAIR Bilateral     inguinal   • SHOULDER SURGERY Right     RCR        Family History:  No family history on file.     Social History:   Social History     Tobacco Use   • Smoking status: Never Smoker   • Smokeless tobacco: Never Used   Substance Use Topics   • Alcohol use: Yes     Comment: one jim/week        Allergies:  No Known Allergies    Home Meds:    (Not in a hospital admission)    Objective:    Vitals Ranges:   Heart Rate:  [67] 67  BP: (141)/(71) 141/71  Body mass index is 30.71 kg/m².     Exam:  General Appearance:  WDWN    HEENT:   without obvious abnormality,  Conjunctiva/corneas clear,  Normal external ear canals, no drainage    Clear orsalmucosa,  Mallampati score 3    Neck:  Supple,  symmetrical, trachea midline. No JVD.  Lungs:   Bilateral basal rhonchi bilaterally, respirations unlabored symmetrical wall movement.    Chest wall:  No tenderness or deformity.    Heart:  Regular rate and rhythm, S1 and S2 normal.  Extremities: Trace edema no clubbing or Cyanosis        Data Review:  All labs (24hrs): No results found for this or any previous visit (from the past 24 hour(s)).     Imaging:  [unfilled]    ASSESSMENT:  Diagnoses and all orders for this visit:    Obstructive sleep apnea, adult    Gastroesophageal reflux disease without esophagitis    Type 2 diabetes mellitus without complication, without long-term current use of insulin (CMS/[facility])        PLAN:  This patient with obstructive sleep apnea, compliance is improved. Encourage to use it more frequent, I re-emphasized on pt the long and short term benefit of treating ZENA.     Treating ZENA will improve GERD symptoms control       Amanuel Cruz MD. D, ABSM.  11/13/2018  3:41 PM

## 2019-01-07 RX ORDER — PANTOPRAZOLE SODIUM 40 MG/1
TABLET, DELAYED RELEASE ORAL
Qty: 90 TABLET | Refills: 0 | Status: SHIPPED | OUTPATIENT
Start: 2019-01-07 | End: 2019-06-07

## 2019-01-07 NOTE — TELEPHONE ENCOUNTER
Dr. Acosta only filled it due to you being out on WOLF, it appears you did a procedure on the patient some time ago and filled it post op.

## 2019-01-11 RX ORDER — PANTOPRAZOLE SODIUM 40 MG/1
TABLET, DELAYED RELEASE ORAL
Qty: 90 TABLET | Refills: 0 | OUTPATIENT
Start: 2019-01-11

## 2019-01-28 RX ORDER — PANTOPRAZOLE SODIUM 40 MG/1
TABLET, DELAYED RELEASE ORAL
Qty: 90 TABLET | Refills: 0 | OUTPATIENT
Start: 2019-01-28

## 2019-01-30 RX ORDER — PANTOPRAZOLE SODIUM 40 MG/1
TABLET, DELAYED RELEASE ORAL
Qty: 90 TABLET | Refills: 0 | OUTPATIENT
Start: 2019-01-30

## 2019-03-13 ENCOUNTER — HOSPITAL ENCOUNTER (OUTPATIENT)
Dept: PHYSICAL THERAPY | Facility: HOSPITAL | Age: 79
Setting detail: THERAPIES SERIES
Discharge: HOME | End: 2019-03-13

## 2019-03-13 DIAGNOSIS — R41.89 COGNITIVE DECLINE: ICD-10-CM

## 2019-03-13 DIAGNOSIS — Z74.09 IMPAIRED FUNCTIONAL MOBILITY, BALANCE, GAIT, AND ENDURANCE: ICD-10-CM

## 2019-03-13 DIAGNOSIS — R29.6 MULTIPLE FALLS: Primary | ICD-10-CM

## 2019-03-13 DIAGNOSIS — F03.90 DEMENTIA WITHOUT BEHAVIORAL DISTURBANCE, UNSPECIFIED DEMENTIA TYPE: ICD-10-CM

## 2019-03-13 PROCEDURE — 97163 PT EVAL HIGH COMPLEX 45 MIN: CPT

## 2019-03-13 NOTE — THERAPY EVALUATION
.Outpatient Physical Therapy Neuro Initial Evaluation   Jovita Greene     Patient Name: Zach Gutierrez  : 1940  MRN: [id number]  Today's Date: 3/13/2019      Visit Date: 2019    Patient Active Problem List   Diagnosis   • Osteoarthritis of right knee   • Status post total right knee replacement   • BPH with obstruction/lower urinary tract symptoms   • Obstructive sleep apnea, adult   • Gastroesophageal reflux disease without esophagitis   • Type 2 diabetes mellitus without complication, without long-term current use of insulin (CMS/[facility])        Past Medical History:   Diagnosis Date   • Arthritis     BACK PAIN   • Dementia    • Diabetes (CMS/HCC)     TYPE 2   • Knee sprain    • ZENA (obstructive sleep apnea)     cpap        Past Surgical History:   Procedure Laterality Date   • COLONOSCOPY N/A 2017    Procedure: COLONOSCOPY;  Surgeon: Srikanth Payan MD;  Location: [facility] OR;  Service:    • CYSTOSCOPY TRANSURETHRAL RESECTION OF PROSTATE N/A 10/16/2017    Procedure: CYSTOSCOPY TRANSURETHRAL RESECTION OF PROSTATE;  Surgeon: Basil Yang MD;  Location: [facility] OR;  Service:    • HERNIA REPAIR Bilateral     inguinal   • NE TOTAL KNEE ARTHROPLASTY Right 2017    Procedure: TOTAL KNEE ARTHROPLASTY hima orthoalign antibiotic cement 7fr hemovac placement;  Surgeon: Bj Thrasher MD;  Location: [facility] OR;  Service: Orthopedics   • SHOULDER SURGERY Right     RCR         Visit Dx:     ICD-10-CM ICD-9-CM   1. Multiple falls R29.6 V15.88   2. Impaired functional mobility, balance, gait, and endurance Z74.09 V49.89   3. Cognitive decline R41.89 294.9   4. Dementia without behavioral disturbance, unspecified dementia type F03.90 294.20       Patient History     Row Name 19 1400             History    Chief Complaint  Balance Problems;Difficulty Walking;Falls/history of falls  -      Date Current Problem(s) Began  18  -      Brief Description of Current Complaint  Patient  "presents with PT order due to falls.  Wife arrived with patient, then attempted to leave.  PT requested wife be present for history taking portion of evaluation at the minimum due to patient history of dementia.  Wife present/assisted with history - patient with 2 falls.  One fall when patient walking alone outside, fell on side of street and neighbor stopped to help him up.  Another fall in the house of unknown origin, wife noticed broken lamp and patient said something tripped him.  Uses walking stick when he walks outside, otherwise no assistive device use.  Does not have Life Alert, but wife reports he takes cell phone with him and wife has tracking bonnie on her phone so she can see where he is.  Wife reports patient is often home alone, \"he'll be home about 5 hours tonight.  He'll be watching  games. He'll be fine.\"  Per neurologist appointment, patient socred 9/30 on SLums testing, and is classified as stage 5 Moderately SEvere cognitive decline.  Wife requesting \"core strengthening exercises\" from Juice Dockery, former PT.  Wife would like PT to teach patient how to get back up from ground so that if he falls again while walking, he can get up.  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  Listening  -      Teaching needs identified  Home Exercise Program;Falls Prevention;Management of Condition  -      Patient is concerned about/has problems with  Walking  -      Does patient have problems with the following?  Other (comment) moderately severe cognitive decline  -      Barriers to learning  Cognitive  -      Action taken for identified issues  emphasis on caregiver education, written materials  -      Pt Participated in POC and Goals  Yes  -         Safety    Are you being hurt, hit, or frightened by anyone at home or in your life?  No  -LH      Are you being neglected by a caregiver  No  -        User Key  (r) = Recorded By, (t) = Taken By, (c) = Cosigned By    Initials Name Provider Type     Renay Silver, PT Physical Therapist              PT Neuro     Row Name 03/13/19 1500             Precautions and Contraindications    Precautions  impaired cognition, poor safety awareness  -         Home Living    Living Arrangements  house  -      Living Environment Comment  lives with wife but continues to spend large chunks of time alone.  Wife reports \"he does fine.\"  Has grab bars in shower.  Uses walking stick for outdoor ambulation.  Also have quad cane and rolling walker but does not use.  -         Vision-Basic Assessment    Current Vision  Wears glasses all the time  -         Cognition    Overall Cognitive Status  Unable to assess  -      Memory  Decreased short term memory  -      Orientation Level  Oriented to place;Oriented to situation;Oriented to person  -      Safety Judgment  Decreased awareness of need for safety  -      Deficits  Not aware of deficits  -      Comments  patient able to only follow one step directions.  Within session, patient forgot about having gait belt in place, during 6 minute walk test, patient forgot task and required cues/direction each time at end of hallway to turn and continue walking.    -   "       Sensation    Additional Comments  Patient reports no numbness/tingling, no issues with neuropathy  -         Posture/Observations    Alignment Options  Forward head;Rounded shoulders  -      Forward Head  Mild  -      Rounded Shoulders  Bilateral:;Mild  -      Posture/Observations Comments  stands with wide base of support, forward flexed posture.  -         General ROM    GENERAL ROM COMMENTS  AROM B LE WFL  -         MMT (Manual Muscle Testing)    Rt Lower Ext  Rt Hip Flexion;Rt Hip ABduction;Rt Knee Extension;Rt Knee Flexion;Rt Ankle Dorsiflexion  -      Lt Lower Ext  Lt Hip Flexion;Lt Hip ABduction;Lt Knee Extension;Lt Knee Flexion;Lt Ankle Dorsiflexion  -         MMT Right Lower Ext    Rt Hip Flexion MMT, Gross Movement  (5/5) normal  -      Rt Hip ABduction MMT, Gross Movement  (5/5) normal  -      Rt Knee Extension MMT, Gross Movement  (5/5) normal  -      Rt Knee Flexion MMT, Gross Movement  (5/5) normal  -      Rt Ankle Dorsiflexion MMT, Gross Movement  (5/5) normal  -         MMT Left Lower Ext    Lt Hip Flexion MMT, Gross Movement  (5/5) normal  -      Lt Hip ABduction MMT, Gross Movement  (5/5) normal  -      Lt Knee Extension MMT, Gross Movement  (5/5) normal  -      Lt Knee Flexion MMT, Gross Movement  (5/5) normal  -LH      Lt Ankle Dorsiflexion MMT, Gross Movement  (5/5) normal  -         Transfers    Sit-Stand Morrill (Transfers)  supervision  -      Stand-Sit Morrill (Transfers)  supervision  -         Gait/Stairs Assessment/Training    Morrill Level (Gait)  supervision;verbal cues  -      Deviations/Abnormal Patterns (Gait)  base of support, wide;alayna decreased;gait speed decreased;stride length decreased  -      Bilateral Gait Deviations  forward flexed posture  -         Balance Skills Training    Rhomberg  feet together on firm surface: eyes open and closed x 60 seconds with CGA, on foam pad:  CGA/min A with eyes open x 60  seconds and with eyes closed x 20 seconds  -      Balance Comments  with manual perturbations: slow loss of balance posterior, delayed balance/postural reactions noted, reliance on stepping strategy with delayed initiation increasing risk of falls  -        User Key  (r) = Recorded By, (t) = Taken By, (c) = Cosigned By    Initials Name Provider Type     Renay Silver, PT Physical Therapist                  Therapy Education  Education Details: patient and wife education on fall prevention including removal of throw rugs, proper shoeware, adequate lighting, supervision due to impaired cognition.  From PT standpoint, patient is unsafe to continue ambulating independently outside - recommend wife or other caregiver be with him at all times.   Wife reports she is unable to walk as slowly as he does and therefore she walks ahead of him.    Given: Fall prevention and home safety  Program: New  How Provided: Verbal  Provided to: Patient, Caregiver  Level of Understanding: Verbalized(reinforcement needed. Wife appears resistant to PT recommendations for safety.)    PT OP Goals     Row Name 03/13/19 1500          PT Short Term Goals    STG Date to Achieve  03/27/19  -     STG 1  Patient/caregivers to be independent with HEP in order to maintain and improve upon gains made in therapy.  -     STG 1 Progress  New  -     STG 2  Patient will ambulate at least 1200 feet in 6 minute walk test to demonstrate increased gait speed and safety with mobility.  -     STG 2 Progress  New  [facility]     STG 3  patient will ambulate at least 500 feet over outdoor grassy, unlevel surfaces with CGA.  -     STG 3 Progress  New  [facility]        Long Term Goals    LTG Date to Achieve  04/10/19  -     LTG 1  Patient will perform Timed Up and Go in less than 15 seconds to demonstrate increased speed with transfers and gait and improved safety with functional mobility.  -     LTG 1 Progress  New  [facility]     LTG 2  Patient will ambulate  outdoor with supervision, at least 10 minutes to demonstrate safety with supervised community mobility.  -     LTG 2 Progress  New  -     LTG 3  Patient will transfer floor to and from stand with UE support as needed and supervision.  -     LTG 3 Progress  New  -        Time Calculation    PT Goal Re-Cert Due Date  04/10/19  -       User Key  (r) = Recorded By, (t) = Taken By, (c) = Cosigned By    Initials Name Provider Type     Renay Silver, PT Physical Therapist          PT Assessment/Plan     Row Name 03/13/19 1500          PT Assessment    Functional Limitations  Decreased safety during functional activities;Impaired gait;Limitation in home management;Limitations in community activities  -     Impairments  Balance;Cognition;Gait  -     Assessment Comments  Mr. Gutierrez presents today with history of falls and moderately severe cognitive decline.  He demonstrates good bilateral lower extremity strength.  He is able to follow one step directions with cues, demonstrating decreased safety awareness and a lack of awareness of deficits/need for safety and assistance.  He demonstrates impaired postural control/balance reactions placing him at risk of falls.  Due to fall risk and impaired cognition, recommend no more unsupervised outdoor mobility.  Recommend wife or other caregive with him at all times for safety.  Wife notified.  Patient would benefit from skilled PT services to develop and implement HEP, provide caregiver education and progress safety with functional mobility.  -     Please refer to paper survey for additional self-reported information  Yes  -     Rehab Potential  Fair  -     Patient/caregiver participated in establishment of treatment plan and goals  Yes  -     Patient would benefit from skilled therapy intervention  Yes  -        PT Plan    PT Frequency  2x/week  -     Predicted Duration of Therapy Intervention (Therapy Eval)  4 weeks  -     Planned CPT's?  PT THER  "PROC EA 15 MIN: 93869;PT NEUROMUSC RE-EDUCATION EA 15 MIN: 44791;PT GAIT TRAINING EA 15 MIN: 29269  -     Physical Therapy Interventions (Optional Details)  balance training;gait training;home exercise program;neuromuscular re-education;patient/family education;postural re-education  -       User Key  (r) = Recorded By, (t) = Taken By, (c) = Cosigned By    Initials Name Provider Type     Renay Silver, PT Physical Therapist           Outcome Measure Options: 6 Minute Walk Test, Tinetti, Timed Up and Go (TUG)  6 Minute Walk Test  Distance: 980  Tinetti Assessment  Tinetti Assessment: yes  Sitting Balance: Steady,safe  Arises: Able in 1 attempt  Attempts to Rise: Able in 1 attempt  Immediate Standing Balance (first 5 sec): Steady without support  Standing Balance: Steady, stance > 4 inch ADRIAN & requires support  Sternal Nudge (feet close together): Begins to fall  Eyes Closed (feet close together): Steady  Turning 360 Degrees- Steps: Continuous steps  Turning 360 Degrees- Steadiness: Steady  Sitting Down: Safe, smooth motion  Tinetti Balance Score: 13  Gait Initiation (immediate after told \"go\"): No hesitancy  Step Length- Right Swing: Right swing foot passes Left stance leg  Step Length- Left Swing: Left swing foot passes Right  Foot Clearance- Right Foot: Right foot completely clears floor  Foot Clearance- Left Foot: Left foot completely clears floor  Step Symmetry: Right and Left step length equal  Step Continuity: Steps appear continuous  Path (excursion): Straight without device  Trunk: None of the above  Base of Support: Heels apart  Gait Score: 11  Tinetti Total Score: 24  Timed Up and Go (TUG)  TUG Test 1: 19.35 seconds  TUG Test 2: 17.31 seconds    Time Calculation:   Start Time: 1400  Stop Time: 1445  Time Calculation (min): 45 min     Therapy Charges for Today     Code Description Service Date Service Provider Modifiers Qty    82578697892  PT EVAL HIGH COMPLEXITY 3 3/13/2019 Renay Silver, " PT GP 1          PT G-Codes  Outcome Measure Options: 6 Minute Walk Test, Tinetti, Timed Up and Go (TUG)  Tinetti Total Score: 24  TUG Test 1: 19.35 seconds  TUG Test 2: 17.31 seconds         Renay Silver, PT  3/13/2019

## 2019-03-18 ENCOUNTER — HOSPITAL ENCOUNTER (OUTPATIENT)
Dept: PHYSICAL THERAPY | Facility: HOSPITAL | Age: 79
Setting detail: THERAPIES SERIES
Discharge: HOME | End: 2019-03-18

## 2019-03-18 DIAGNOSIS — R29.6 MULTIPLE FALLS: Primary | ICD-10-CM

## 2019-03-18 DIAGNOSIS — R41.89 COGNITIVE DECLINE: ICD-10-CM

## 2019-03-18 DIAGNOSIS — F03.90 DEMENTIA WITHOUT BEHAVIORAL DISTURBANCE, UNSPECIFIED DEMENTIA TYPE: ICD-10-CM

## 2019-03-18 DIAGNOSIS — Z74.09 IMPAIRED FUNCTIONAL MOBILITY, BALANCE, GAIT, AND ENDURANCE: ICD-10-CM

## 2019-03-18 PROCEDURE — 97112 NEUROMUSCULAR REEDUCATION: CPT

## 2019-03-18 PROCEDURE — 97110 THERAPEUTIC EXERCISES: CPT

## 2019-03-18 NOTE — THERAPY TREATMENT NOTE
"    Outpatient Physical Therapy Neuro Treatment Note   Jovita Greene     Patient Name: Zach Gutierrez  : 1940  MRN: [id number]  Today's Date: 3/18/2019      Visit Date: 2019    Visit Dx:    ICD-10-CM ICD-9-CM   1. Multiple falls R29.6 V15.88   2. Impaired functional mobility, balance, gait, and endurance Z74.09 V49.89   3. Cognitive decline R41.89 294.9   4. Dementia without behavioral disturbance, unspecified dementia type F03.90 294.20       Patient Active Problem List   Diagnosis   • Osteoarthritis of right knee   • Status post total right knee replacement   • BPH with obstruction/lower urinary tract symptoms   • Obstructive sleep apnea, adult   • Gastroesophageal reflux disease without esophagitis   • Type 2 diabetes mellitus without complication, without long-term current use of insulin (CMS/[facility])       PT Assessment/Plan     Row Name 19 1400          PT Assessment    Assessment Comments  Mr. Gutierrez demonstrated good balance stability with pivot turns and squatting to retrieve items off of floor.  Impaired balance noted on foam pad with increased anxiety/agitation noted.  -        PT Plan    PT Plan Comments  patient and wife to practice balance exercises twice daily.  Returns Wednesday - progress with increased reps/weights  -       User Key  (r) = Recorded By, (t) = Taken By, (c) = Cosigned By    Initials Name Provider Type     Renay Silver, PT Physical Therapist               Exercises     Row Name 19 1502 19 1400          Precautions    Existing Precautions/Restrictions  --  fall;other (see comments) dementia  -        Subjective Comments    Subjective Comments  --  \"I don't think I have a problem with my balance, its just my wife who thinks that.\"  -        Total Minutes    57290 - PT Therapeutic Exercise Minutes  37  -  --     75404 -  PT Neuromuscular Reeducation Minutes  15  -LH  --        Exercise 1    Exercise Name 1  --  sit to/from stand with no UE " assist, SBA  -     Cueing 1  --  Verbal;Demo  -     Reps 1  --  10  -        Exercise 2    Exercise Name 2  --  foam pad balance - feet apart, eyes open x 60 seconds, eyes closed 15 seconds x 2 with min A for balance for both due to increased anterior weight shift.  -     Cueing 2  --  Verbal;Demo  -        Exercise 3    Exercise Name 3  --  foam pad head turns with min A for balance, feet apart  -     Cueing 3  --  Verbal;Demo  -     Reps 3  --  10  -     Additional Comments  --  increased encouragement required, patient becoming agitated with foam pad exercises  -        Exercise 4    Exercise Name 4  --  item  - patient ambulated around hallway, squatting to retrieve cones from floor, then return to stand.  10 cones, CGA/SBA  -        Exercise 5    Exercise Name 5  --  floor to/from stand transfers.  Min A required for initial transfer to stand, CGA for second transfer.  Patient performed transfer via half kneel with hands on thigh 2nd time with improved performance, hands on floor first rep requiring assist due to anterior loss of balance  -        Exercise 6    Exercise Name 6  --  weighted gait - 6 minute walk test with 2# ankle weights on and SBA.  Good balance/stability during turns, verbal cues at end of each hallway to turn and continue walking, cues to maintain pace  -     Cueing 6  --  Verbal;Demo  -     Additional Comments  --  1250 feet in 6 minutes  -       User Key  (r) = Recorded By, (t) = Taken By, (c) = Cosigned By    Initials Name Provider Type     Renay Silver, PT Physical Therapist        Therapy Education  Education Details: issued written HEP for balance exercises - feet together with eyes open, eyes closed and with head turns.  Demonstrated exercises for wife, also demonstrated for wife how patient performed transfers floor to/from stand. Instructed to perform at kitchen counter or corner with wife present for safety.  Also reinforced that patient  is not safe to ambulate independently outside - recommend wife/caregiver with him at all times.  Given: HEP  Program: New  How Provided: Verbal, Demonstration, Written  Provided to: Patient, Caregiver  Level of Understanding: Teach back education performed, Verbalized    Time Calculation:   Start Time: 1400  Stop Time: 1452  Time Calculation (min): 52 min     Therapy Charges for Today     Code Description Service Date Service Provider Modifiers Qty    74093191018 HC PT NEUROMUSC RE EDUCATION EA 15 MIN 3/18/2019 Renay Silver, PT GP 1    [id number]  PT THER PROC EA 15 MIN 3/18/2019 Renay Silver, PT GP 2          Renay Silver, PT  3/18/2019

## 2019-03-20 ENCOUNTER — HOSPITAL ENCOUNTER (OUTPATIENT)
Dept: PHYSICAL THERAPY | Facility: HOSPITAL | Age: 79
Setting detail: THERAPIES SERIES
Discharge: HOME | End: 2019-03-20

## 2019-03-20 DIAGNOSIS — R29.6 MULTIPLE FALLS: Primary | ICD-10-CM

## 2019-03-20 DIAGNOSIS — R41.89 COGNITIVE DECLINE: ICD-10-CM

## 2019-03-20 DIAGNOSIS — Z74.09 IMPAIRED FUNCTIONAL MOBILITY, BALANCE, GAIT, AND ENDURANCE: ICD-10-CM

## 2019-03-20 PROCEDURE — 97110 THERAPEUTIC EXERCISES: CPT

## 2019-03-20 PROCEDURE — 97112 NEUROMUSCULAR REEDUCATION: CPT

## 2019-03-20 NOTE — THERAPY TREATMENT NOTE
Outpatient Physical Therapy Neuro Treatment Note   Jovita Greene     Patient Name: Zach Gutierrez  : 1940  MRN: [id number]  Today's Date: 3/20/2019      Visit Date: 2019    Visit Dx:    ICD-10-CM ICD-9-CM   1. Multiple falls R29.6 V15.88   2. Impaired functional mobility, balance, gait, and endurance Z74.09 V49.89   3. Cognitive decline R41.89 294.9       Patient Active Problem List   Diagnosis   • Osteoarthritis of right knee   • Status post total right knee replacement   • BPH with obstruction/lower urinary tract symptoms   • Obstructive sleep apnea, adult   • Gastroesophageal reflux disease without esophagitis   • Type 2 diabetes mellitus without complication, without long-term current use of insulin (CMS/[facility])       PT Assessment/Plan     Row Name 19 1500          PT Assessment    Assessment Comments  Increased gait speed even with increased ankle weights today.  Good balance with pivot turns and retrieving items off of floor.  Increased difficulty noted with floor to stand transfers today.  -        PT Plan    PT Plan Comments  Add stairs to next visit for additional LE strengthening.  -       User Key  (r) = Recorded By, (t) = Taken By, (c) = Cosigned By    Initials Name Provider Type     Renay Silver, PT Physical Therapist             Exercises     Row Name 19 1510 19 1400          Precautions    Existing Precautions/Restrictions  --  fall;other (see comments) dementia  -        Subjective Comments    Subjective Comments  --  Patient reports he walked for 2 hours prior to PT treatment.  Patient reports he has no issues with balance.  Wife confused why patient walks so well here in clinic, but not at home.  -        Total Minutes    04936 - PT Therapeutic Exercise Minutes  30  -  --     04483 -  PT Neuromuscular Reeducation Minutes  15  -LH  --        Exercise 1    Exercise Name 1  --  sit to/from stand with no UE assist, SBA  -     Cueing 1  --  Verbal;Demo   -     Reps 1  --  10  -        Exercise 2    Exercise Name 2  --  foam pad balance - feet apart, eyes open x 60 seconds, eyes closed 15 seconds x 3 with min A for balance for both due to increased anterior weight shift.  -     Cueing 2  --  Verbal;Demo  -        Exercise 3    Exercise Name 3  --  foam pad head turns with min A for balance, feet apart  -     Cueing 3  --  Verbal;Demo  -     Reps 3  --  10  -LH        Exercise 4    Exercise Name 4  --  item  - patient ambulated around hallway, squatting to retrieve cones from floor, then return to stand.  16 cones, CGA/SBA  -        Exercise 5    Exercise Name 5  --  floor to/from stand transfers.  Min A x 3 via half kneel transfer  -        Exercise 6    Exercise Name 6  --  weighted gait - 6 minute walk test with 3# ankle weights on and SBA.  Good balance/stability during turns, verbal cues at end of each hallway to turn and continue walking, cues to maintain pace  -     Cueing 6  --  Verbal;Demo  -        Exercise 7    Exercise Name 7  --  foam pad step ups - no UE support, min A for balance  -     Cueing 7  --  Verbal;Demo  -     Reps 7  --  10  -        Exercise 8    Exercise Name 8  --  Incline:  up/down incline with SBA, stood on incline with eyes open x 60 seconds, eyes closed x 30 seconds with CGA  -     Cueing 8  --  Verbal;Demo  [facility]       User Key  (r) = Recorded By, (t) = Taken By, (c) = Cosigned By    Initials Name Provider Type     Renay Silver, PT Physical Therapist          Time Calculation:   Start Time: 1400  Stop Time: 1445  Time Calculation (min): 45 min   Therapy Charges for Today     Code Description Service Date Service Provider Modifiers Qty    04967128379 HC PT NEUROMUSC RE EDUCATION EA 15 MIN 3/20/2019 Renay Silver, PT GP 1    20397143141 HC PT THER PROC EA 15 MIN 3/20/2019 Renay Silver, PT GP 2        Renay Silver PT  3/20/2019

## 2019-03-27 ENCOUNTER — HOSPITAL ENCOUNTER (OUTPATIENT)
Dept: PHYSICAL THERAPY | Facility: HOSPITAL | Age: 79
Setting detail: THERAPIES SERIES
Discharge: HOME | End: 2019-03-27

## 2019-03-27 DIAGNOSIS — R29.6 MULTIPLE FALLS: Primary | ICD-10-CM

## 2019-03-27 DIAGNOSIS — Z74.09 IMPAIRED FUNCTIONAL MOBILITY, BALANCE, GAIT, AND ENDURANCE: ICD-10-CM

## 2019-03-27 PROCEDURE — 97112 NEUROMUSCULAR REEDUCATION: CPT

## 2019-03-27 PROCEDURE — 97110 THERAPEUTIC EXERCISES: CPT

## 2019-03-27 NOTE — THERAPY TREATMENT NOTE
Outpatient Physical Therapy Neuro Treatment Note  SHAQUILLE Nation     Patient Name: Zach Gutierrez  : 1940  MRN: [id number]  Today's Date: 3/27/2019      Visit Date: 2019    Visit Dx:    ICD-10-CM ICD-9-CM   1. Multiple falls R29.6 V15.88   2. Impaired functional mobility, balance, gait, and endurance Z74.09 V49.89       Patient Active Problem List   Diagnosis   • Osteoarthritis of right knee   • Status post total right knee replacement   • BPH with obstruction/lower urinary tract symptoms   • Obstructive sleep apnea, adult   • Gastroesophageal reflux disease without esophagitis   • Type 2 diabetes mellitus without complication, without long-term current use of insulin (CMS/[facility])       PT Assessment/Plan     Row Name 19 1500          PT Assessment    Assessment Comments  No c/o back pain throughout session.  Progressed with increased reps and increased ankle weights with no c/o fatigue.  -        PT Plan    PT Plan Comments  Scheduled to return Friday.  resume exercises based on patient/wife reports of recent back pain.  -       User Key  (r) = Recorded By, (t) = Taken By, (c) = Cosigned By    Initials Name Provider Type     Renay Silver, PT Physical Therapist             Exercises     Row Name 19 1512 19 1300          Precautions    Existing Precautions/Restrictions  --  fall;other (see comments) dementia  -        Subjective Comments    Subjective Comments  --  Wife reports patient has been complaining of low back pain since last PT visit, thinks it is from floor transfers last visit.   Patient reported no pain today, no pain throughout session. Both report he has been walking sons dog outside in field on unlevel ground, report his legs are fatigued afterwards but he is careful and has had no balance issues.  -        Total Minutes    11705 - PT Therapeutic Exercise Minutes  30  -  --     85747 -  PT Neuromuscular Reeducation Minutes  13  -LH  --        Exercise 1     Exercise Name 1  --  sit to/from stand with no UE assist, SBA  -     Cueing 1  --  Verbal;Demo  -     Sets 1  --  2  -     Reps 1  --  10  -        Exercise 2    Exercise Name 2  --  foam pad balance - feet apart, eyes open x 60 seconds, eyes closed 15 seconds x 3 with min A for balance for both due to increased anterior weight shift.  -LH     Cueing 2  --  Verbal;Demo  -        Exercise 3    Exercise Name 3  --  foam pad head turns with min A for balance, feet apart  -     Cueing 3  --  Verbal;Demo  -     Reps 3  --  10  -LH        Exercise 4    Exercise Name 4  --  item  - held today due to wife reports of patient with back pain  -        Exercise 5    Exercise Name 5  --  floor to/from stand transfers - held today due to wife reports of patient with back pain  -        Exercise 6    Exercise Name 6  --  weighted gait - 6 minute walk test with 4# ankle weights on and SBA.  Good balance/stability during turns, verbal cues at end of each hallway to turn and continue walking, cues to maintain pace  -     Cueing 6  --  Verbal;Demo  -     Additional Comments  --  1300 end  -        Exercise 7    Exercise Name 7  --  foam pad step ups - no UE support, min A for balance  -     Cueing 7  --  Verbal;Demo  -     Reps 7  --  10 bilateral  -        Exercise 8    Exercise Name 8  --  --  -     Cueing 8  --  --  -       User Key  (r) = Recorded By, (t) = Taken By, (c) = Cosigned By    Initials Name Provider Type     Renay Silver, PT Physical Therapist          PT OP Goals     Row Name 03/27/19 1500          PT Short Term Goals    STG Date to Achieve  03/27/19  -     STG 1  Patient/caregivers to be independent with HEP in order to maintain and improve upon gains made in therapy.  -     STG 1 Progress  Met  -     STG 2  Patient will ambulate at least 1200 feet in 6 minute walk test to demonstrate increased gait speed and safety with mobility.  -     STG 2 Progress  Met   -     STG 3  patient will ambulate at least 500 feet over outdoor grassy, unlevel surfaces with CGA.  -     STG 3 Progress  Met per patient and wife report  -        Long Term Goals    LTG Date to Achieve  04/10/19  -     LTG 1  Patient will perform Timed Up and Go in less than 15 seconds to demonstrate increased speed with transfers and gait and improved safety with functional mobility.  -     LTG 1 Progress  New  [facility]     LTG 2  Patient will ambulate outdoor with supervision, at least 10 minutes to demonstrate safety with supervised community mobility.  -     LTG 2 Progress  New  [facility]     LTG 3  Patient will transfer floor to and from stand with UE support as needed and supervision.  -     LTG 3 Progress  New  [facility]       User Key  (r) = Recorded By, (t) = Taken By, (c) = Cosigned By    Initials Name Provider Type     Renay Silver, PT Physical Therapist          Therapy Education  Education Details: wife asking about patient recieving PT for back pain - advised her to request order for PT from PCP if she is concerned, but advised her that he had no complaints throughout [facility] treatment session.  Provided to: Caregiver, Patient  Level of Understanding: Verbalized    Time Calculation:   Start Time: 1300  Stop Time: 1343  Time Calculation (min): 43 min   Therapy Charges for Today     Code Description Service Date Service Provider Modifiers Qty    06020475677  PT NEUROMUSC RE EDUCATION EA 15 MIN 3/27/2019 Renay Silver, PT GP 1    [id number]  PT THER PROC EA 15 MIN 3/27/2019 Renay Silver, PT GP 2        Renay Silver PT  3/27/2019

## 2019-03-29 ENCOUNTER — HOSPITAL ENCOUNTER (OUTPATIENT)
Dept: PHYSICAL THERAPY | Facility: HOSPITAL | Age: 79
Setting detail: THERAPIES SERIES
Discharge: HOME | End: 2019-03-29

## 2019-03-29 DIAGNOSIS — R41.89 COGNITIVE DECLINE: ICD-10-CM

## 2019-03-29 DIAGNOSIS — Z74.09 IMPAIRED FUNCTIONAL MOBILITY, BALANCE, GAIT, AND ENDURANCE: ICD-10-CM

## 2019-03-29 DIAGNOSIS — R29.6 MULTIPLE FALLS: Primary | ICD-10-CM

## 2019-03-29 PROCEDURE — 97112 NEUROMUSCULAR REEDUCATION: CPT

## 2019-03-29 NOTE — THERAPY TREATMENT NOTE
Outpatient Physical Therapy Neuro Treatment Note   Jovita Greene     Patient Name: Zach Gutierrez  : 1940  MRN: [id number]  Today's Date: 3/29/2019      Visit Date: 2019    Visit Dx:    ICD-10-CM ICD-9-CM   1. Multiple falls R29.6 V15.88   2. Impaired functional mobility, balance, gait, and endurance Z74.09 V49.89   3. Cognitive decline R41.89 294.9       Patient Active Problem List   Diagnosis   • Osteoarthritis of right knee   • Status post total right knee replacement   • BPH with obstruction/lower urinary tract symptoms   • Obstructive sleep apnea, adult   • Gastroesophageal reflux disease without esophagitis   • Type 2 diabetes mellitus without complication, without long-term current use of insulin (CMS/[facility])       PT Assessment/Plan     Row Name 19 1600          PT Assessment    Assessment Comments  No c/o back pain throughout session.  Patient demonstrated increased gait speed today, good balance with heel/toe walking.  Difficulty with stairs and tandem gait - suspect possible visual deficits.  -        PT Plan    PT Plan Comments  Scheduled to return twice next week.  Continue with current exercises, progressing as able.  -       User Key  (r) = Recorded By, (t) = Taken By, (c) = Cosigned By    Initials Name Provider Type     Renay Silver, PT Physical Therapist             Exercises     Row Name 19 1613 19 1600          Precautions    Existing Precautions/Restrictions  --  fall;other (see comments) dementia  -        Subjective Comments    Subjective Comments  --  Wife reports she sees improvements in his walking and balance, although patient reports he does not.  -        Total Minutes    06653 -  PT Neuromuscular Reeducation Minutes  40  -  --        Exercise 1    Exercise Name 1  --  sit to/from stand with no UE assist, SBA  -     Cueing 1  --  Verbal;Demo  -     Reps 1  --  20  -        Exercise 2    Exercise Name 2  --  foam pad balance - feet  apart, eyes open x 60 seconds, eyes closed 15 seconds x 3 with min A for balance for both due to increased anterior weight shift.  -     Cueing 2  --  Verbal;Demo  -        Exercise 3    Exercise Name 3  --  foam pad head turns with CGA for balance, feet apart  -     Cueing 3  --  Verbal;Demo  -     Reps 3  --  10  -LH        Exercise 4    Exercise Name 4  --  item  - held today due to wife reports of patient with back pain  -        Exercise 5    Exercise Name 5  --  floor to/from stand transfers - held today due to wife reports of patient with back pain  -        Exercise 6    Exercise Name 6  --  weighted gait - 6 minute walk test with 4# ankle weights on and SBA.  Good balance/stability during turns, verbal cues at end of each hallway to turn and continue walking, cues to maintain pace  -     Cueing 6  --  Verbal;Demo  -     Additional Comments  --  1460 feet  -        Exercise 7    Exercise Name 7  --  foam pad step ups - no UE support, min A for balance  -     Cueing 7  --  Verbal;Demo  -     Reps 7  --  15 bilateral  -        Exercise 8    Exercise Name 8  --  stairs:  patient ascended/descended 3 - 4 inch and 2 - 6inch steps with no rail, step to pattern.  CGA on 4 inch steps, min A on 6 inch steps - patient taking multiple tentative steps - possibly suggestive of visual difficulties/depth perception issues on stairs  -        Exercise 9    Exercise Name 9  --  tandem gait: 10 feet x 4, CGA, tendency to tandem walk veering left despite repeated verbal cues  -     Cueing 9  --  Verbal;Tactile;Demo  -        Exercise 10    Exercise Name 10  --  heel walk with CGA - 10 feet x 2  -LH     Cueing 10  --  Verbal;Tactile;Demo  -        Exercise 11    Exercise Name 11  --  toe walk with CGA - 10 feet x 2  -LH     Cueing 11  --  Verbal;Tactile;Demo  -        Exercise 12    Exercise Name 12  --  sidestepping with CGA, 10 feet left/right, 2x each  -     Cueing 12  --   Verbal;Tactile;Demo  -        Exercise 13    Exercise Name 13  --  backward gait - patient refused  -       User Key  (r) = Recorded By, (t) = Taken By, (c) = Cosigned By    Initials Name Provider Type     Renay Silver, PT Physical Therapist        Time Calculation:   Start Time: 1415  Stop Time: 1455  Time Calculation (min): 40 min   Therapy Charges for Today     Code Description Service Date Service Provider Modifiers Qty    22642806441  PT NEUROMUSC RE EDUCATION EA 15 MIN 3/29/2019 Renay Silver, PT GP 3          Renay Silver, PT  3/29/2019

## 2019-04-03 ENCOUNTER — HOSPITAL ENCOUNTER (OUTPATIENT)
Dept: PHYSICAL THERAPY | Facility: HOSPITAL | Age: 79
Setting detail: THERAPIES SERIES
Discharge: HOME | End: 2019-04-03

## 2019-04-03 DIAGNOSIS — R41.89 COGNITIVE DECLINE: ICD-10-CM

## 2019-04-03 DIAGNOSIS — Z74.09 IMPAIRED FUNCTIONAL MOBILITY, BALANCE, GAIT, AND ENDURANCE: ICD-10-CM

## 2019-04-03 DIAGNOSIS — R29.6 MULTIPLE FALLS: Primary | ICD-10-CM

## 2019-04-03 PROCEDURE — 97112 NEUROMUSCULAR REEDUCATION: CPT

## 2019-04-03 NOTE — THERAPY TREATMENT NOTE
Outpatient Physical Therapy Neuro Treatment Note  SHAQUILLE Nation     Patient Name: Zach Gutierrez  : 1940  MRN: [id number]  Today's Date: 4/3/2019      Visit Date: 2019    Visit Dx:    ICD-10-CM ICD-9-CM   1. Multiple falls R29.6 V15.88   2. Impaired functional mobility, balance, gait, and endurance Z74.09 V49.89   3. Cognitive decline R41.89 294.9       Patient Active Problem List   Diagnosis   • Osteoarthritis of right knee   • Status post total right knee replacement   • BPH with obstruction/lower urinary tract symptoms   • Obstructive sleep apnea, adult   • Gastroesophageal reflux disease without esophagitis   • Type 2 diabetes mellitus without complication, without long-term current use of insulin (CMS/[facility])       PT Assessment/Plan     Row Name 19 1400          PT Assessment    Assessment Comments  Patient with increased confusion today, requiring increased cues/direction/demonstration.  Decreased gait speed with 6 minute walk test. Safety with mobility limited by decreased cognition.  Recommend 24 hour supervision.  -        PT Plan    PT Plan Comments  Repeat Timed Up and Go next visit.  Wife requesting next visit be last one - PT in agreement.  -       User Key  (r) = Recorded By, (t) = Taken By, (c) = Cosigned By    Initials Name Provider Type     Renay Silver, PT Physical Therapist             Exercises     Row Name 19 1500 19 1400          Precautions    Existing Precautions/Restrictions  --  fall;other (see comments) dementia  -        Subjective Comments    Subjective Comments  --  Wife reports patient does better with his walking in the clinic but she shes no change overall at home.  Patient reports his back has been sore.  Discussed PT for back pain - wife reports patient would not do the prescribed exercises and thus she feels no need for PT.     -        Total Minutes    71232 - PT Therapeutic Exercise Minutes  6  -LH  --     37148 -  PT  "Neuromuscular Reeducation Minutes  37  -LH  --        Exercise 1    Exercise Name 1  --  sit to/from stand with no UE assist, SBA  -     Cueing 1  --  Verbal;Demo  -     Reps 1  --  20  -LH        Exercise 2    Exercise Name 2  --  foam pad balance - feet apart, eyes open x 60 seconds, eyes closed 15 seconds x 3 with min A for balance for both due to increased anterior weight shift.  -LH     Cueing 2  --  Verbal;Demo  -        Exercise 3    Exercise Name 3  --  foam pad head turns with CGA for balance, feet apart  -     Cueing 3  --  Verbal;Demo  -     Reps 3  --  10  -LH        Exercise 6    Exercise Name 6  --  weighted gait - 6 minute walk test with 4# ankle weights on and SBA.  Good balance/stability during turns, verbal cues at end of each hallway to turn and continue walking, cues to maintain pace  -     Cueing 6  --  Verbal;Demo  -     Additional Comments  --  1200 feet  -        Exercise 7    Exercise Name 7  --  foam pad step ups - no UE support, min/mod A for balance  -     Cueing 7  --  Verbal;Demo  -     Reps 7  --  15 bilateral  -        Exercise 8    Exercise Name 8  --  stairs:  patient ascended/descended 3 - 4 inch and 2 - 6inch steps with no rail, step to pattern.  CGA on 4 inch steps, min A on 6 inch steps - patient taking multiple tentative steps - possibly suggestive of visual difficulties/depth perception issues on stairs.  Discussed visual concerns with wife - wife reports she has noticed at home also that his vision appears to be worsening but that insurance will not pay for another visit to eye MD until July.  Patient reports he has more trouble reading, \"I think my eyes have gotten weak.\"  -        Exercise 9    Exercise Name 9  --  tandem gait: 10 feet x 4, CGA, tendency to tandem walk veering left despite repeated verbal cues  -     Cueing 9  --  Verbal;Tactile;Demo  -        Exercise 10    Exercise Name 10  --  heel walk with CGA - 10 feet x 2  -LH     Cueing " 10  --  Verbal;Tactile;Demo  -        Exercise 11    Exercise Name 11  --  toe walk with CGA - 10 feet x 2  -     Cueing 11  --  Verbal;Tactile;Demo  -        Exercise 12    Exercise Name 12  --  sidestepping with CGA, 10 feet left/right, 2x each  -     Cueing 12  --  Verbal;Tactile;Demo  -LH       User Key  (r) = Recorded By, (t) = Taken By, (c) = Cosigned By    Initials Name Provider Type     Renay Silver, PT Physical Therapist          PT OP Goals     Row Name 04/03/19 1400          PT Short Term Goals    STG Date to Achieve  03/27/19  -     STG 1  Patient/caregivers to be independent with HEP in order to maintain and improve upon gains made in therapy.  -     STG 1 Progress  Met  -     STG 2  Patient will ambulate at least 1200 feet in 6 minute walk test to demonstrate increased gait speed and safety with mobility.  -     STG 2 Progress  Met  -     STG 3  patient will ambulate at least 500 feet over outdoor grassy, unlevel surfaces with CGA.  -     STG 3 Progress  Met per patient and wife report  -        Long Term Goals    LTG Date to Achieve  04/10/19  -     LTG 1  Patient will perform Timed Up and Go in less than 15 seconds to demonstrate increased speed with transfers and gait and improved safety with functional mobility.  -     LTG 1 Progress  New  -     LTG 2  Patient will ambulate outdoor with supervision, at least 10 minutes to demonstrate safety with supervised community mobility.  -     LTG 2 Progress  Met per wife report - patient declined outdoor gait today  -     LTG 3  Patient will transfer floor to and from stand with UE support as needed and supervision.  -     LTG 3 Progress  Not Met discontinue goal due to increased back pain with transfers  -       User Key  (r) = Recorded By, (t) = Taken By, (c) = Cosigned By    Initials Name Provider Type     Renay Silver, PT Physical Therapist        Time Calculation:   Start Time: 1401  Stop Time:  1444  Time Calculation (min): 43 min   Therapy Charges for Today     Code Description Service Date Service Provider Modifiers Qty    89832186932 HC PT NEUROMUSC RE EDUCATION EA 15 MIN 4/3/2019 Renay Silver, PT GP 3        Renay Silver, PT  4/3/2019

## 2019-04-05 ENCOUNTER — HOSPITAL ENCOUNTER (OUTPATIENT)
Dept: PHYSICAL THERAPY | Facility: HOSPITAL | Age: 79
Setting detail: THERAPIES SERIES
Discharge: HOME | End: 2019-04-05

## 2019-04-05 DIAGNOSIS — Z74.09 IMPAIRED FUNCTIONAL MOBILITY, BALANCE, GAIT, AND ENDURANCE: ICD-10-CM

## 2019-04-05 DIAGNOSIS — R41.89 COGNITIVE DECLINE: ICD-10-CM

## 2019-04-05 DIAGNOSIS — R29.6 MULTIPLE FALLS: Primary | ICD-10-CM

## 2019-04-05 PROCEDURE — 97110 THERAPEUTIC EXERCISES: CPT

## 2019-04-05 PROCEDURE — 97112 NEUROMUSCULAR REEDUCATION: CPT

## 2019-04-05 NOTE — THERAPY PROGRESS REPORT/RE-CERT
Outpatient Physical Therapy Neuro Treatment Note  SHAQUILLE Nation     Patient Name: Zach Gutierrez  : 1940  MRN: [id number]  Today's Date: 2019      Visit Date: 2019      Patient Active Problem List   Diagnosis   • Osteoarthritis of right knee   • Status post total right knee replacement   • BPH with obstruction/lower urinary tract symptoms   • Obstructive sleep apnea, adult   • Gastroesophageal reflux disease without esophagitis   • Type 2 diabetes mellitus without complication, without long-term current use of insulin (CMS/[facility])           To whom it may concern,    Mr. Gutierrez has been seen in physical therapy to address deficits in gait and balance.  During our treatment sessions, I noticed signs of visual deficits possibly contributing to his balance issues.  Mr. Gutierrez would often reach out tentatively with his feet to feel the step or floor in front of him prior to stepping, as if he was unable see the ground in front of him.  In discussion, Mr. Gutierrez reports he is having trouble reading due to vision and wife reports she has noticed signs of visual issues at home as well.  I have recommended further evaluation of his vision/current glasses prescription.      Thank you.      Renay Silver, PT  2019

## 2019-04-05 NOTE — THERAPY TREATMENT NOTE
Outpatient Physical Therapy Ortho Treatment Note  [facility]     Patient Name: Zach Gutierrez  : 1940  MRN: [id number]  Today's Date: 2019      Visit Date: 2019    Visit Dx:    ICD-10-CM ICD-9-CM   1. Multiple falls R29.6 V15.88   2. Impaired functional mobility, balance, gait, and endurance Z74.09 V49.89   3. Cognitive decline R41.89 294.9       Patient Active Problem List   Diagnosis   • Osteoarthritis of right knee   • Status post total right knee replacement   • BPH with obstruction/lower urinary tract symptoms   • Obstructive sleep apnea, adult   • Gastroesophageal reflux disease without esophagitis   • Type 2 diabetes mellitus without complication, without long-term current use of insulin (CMS/[facility])        Past Medical History:   Diagnosis Date   • Arthritis     BACK PAIN   • Dementia    • Diabetes (CMS/[facility])     TYPE 2   • Knee sprain    • ZENA (obstructive sleep apnea)     cpap        Past Surgical History:   Procedure Laterality Date   • COLONOSCOPY N/A 2017    Procedure: COLONOSCOPY;  Surgeon: Srikanth Payan MD;  Location: [facility] OR;  Service:    • CYSTOSCOPY TRANSURETHRAL RESECTION OF PROSTATE N/A 10/16/2017    Procedure: CYSTOSCOPY TRANSURETHRAL RESECTION OF PROSTATE;  Surgeon: Basil Yang MD;  Location: [facility] OR;  Service:    • HERNIA REPAIR Bilateral     inguinal   • [place] TOTAL KNEE ARTHROPLASTY Right 2017    Procedure: TOTAL KNEE ARTHROPLASTY hima orthoalign antibiotic cement 7fr hemovac placement;  Surgeon: Bj Thrasher MD;  Location: [facility] OR;  Service: Orthopedics   • SHOULDER SURGERY Right     RCR                       PT Assessment/Plan     Row Name 19 1519          PT Assessment    Assessment Comments  Increased sway with standing balance on foam but no significant change between eyes open and eyes closed; pt with decreased speed with 6 minute walk test today but pt reporting afterward that he had already walked 2 miles today;  multiple episodes of pt needing to be redirected or given multiple verbal and demonstration cues for activities  -        PT Plan    PT Plan Comments  Discharge from P.T. at this time per wife's request and therapist's approval  -       User Key  (r) = Recorded By, (t) = Taken By, (c) = Cosigned By    Initials Name Provider Type    Coy Becerra, PTA Physical Therapy Assistant            Exercises     Row Name 04/05/19 1500             Precautions    Existing Precautions/Restrictions  fall;other (see comments) dementia  -         Subjective Comments    Subjective Comments  Pt with no complaints other than stating his legs were a little sore following 6 MWT because he has already walked his 2 miles today  -         Total Minutes    38575 - PT Therapeutic Exercise Minutes  5  -      07272 -  PT Neuromuscular Reeducation Minutes  38  -         Exercise 1    Exercise Name 1  sit to/from stand with no UE assist, SBA  -      Cueing 1  Verbal;Demo  -      Reps 1  20  -MH         Exercise 2    Exercise Name 2  foam pad balance - feet together (as much as pt tolerated) eyes open 60 seconds and eyess closed 60 seconds - CGA with both. Pt did with increased sway both posterior and anterior but did not require UE support or therapist assist to correct   -      Cueing 2  Verbal;Demo  -         Exercise 3    Exercise Name 3  foam pad head turns with CGA for balance, feet together  -      Cueing 3  Verbal;Demo  -      Reps 3  10  -         Exercise 6    Exercise Name 6  weighted gait - 6 minute walk test with 4# ankle weights on and SBA.  Good balance/stability during turns, verbal cues at end of each hallway to turn and continue walking, cues to maintain pace  -      Cueing 6  Verbal;Demo  -MH      Additional Comments  950 ft - pt taking standing rest breaks at each turn  -         Exercise 7    Exercise Name 7  foam pad step ups - no UE support, min/mod A for balance  -      Cueing 7   Verbal;Demo  -MH      Reps 7  15 each (B)  -      Additional Comments  LOB occurred due to pt not clearing his foot onto or off the foam pad  -         Exercise 9    Exercise Name 9  tandem gait: 10 feet x 3, CGA, pt having difficulty placing foot in front of the other - no difference right vs left. Improved when pt cued to slow down  -      Cueing 9  Verbal;Tactile;Demo  -MH         Exercise 10    Exercise Name 10  heel walk with CGA - 10 feet x 2  -MH      Cueing 10  Verbal;Tactile;Demo  -MH         Exercise 11    Exercise Name 11  toe walk with CGA - 10 feet x 2  -MH      Cueing 11  Verbal;Tactile;Demo  -MH         Exercise 12    Exercise Name 12  sidestepping with CGA, 10 feet left/right, 2x each  -      Cueing 12  Verbal;Tactile;Demo  -MH        User Key  (r) = Recorded By, (t) = Taken By, (c) = Cosigned By    Initials Name Provider Type    Coy Becerra, PTA Physical Therapy Assistant                       PT OP Goals     Row Name 04/05/19 1500          PT Short Term Goals    STG Date to Achieve  03/27/19  -     STG 1  Patient/caregivers to be independent with HEP in order to maintain and improve upon gains made in therapy.  -     STG 1 Progress  Met  -     STG 2  Patient will ambulate at least 1200 feet in 6 minute walk test to demonstrate increased gait speed and safety with mobility.  -     STG 2 Progress  Met  -     STG 3  patient will ambulate at least 500 feet over outdoor grassy, unlevel surfaces with CGA.  -     STG 3 Progress  Met per patient and wife report  -        Long Term Goals    LTG Date to Achieve  04/10/19  -     LTG 1  Patient will perform Timed Up and Go in less than 15 seconds to demonstrate increased speed with transfers and gait and improved safety with functional mobility.  -     LTG 1 Progress  Met  -     LTG 2  Patient will ambulate outdoor with supervision, at least 10 minutes to demonstrate safety with supervised community mobility.  -     LTG 2  Progress  Met per wife report - patient declined outdoor gait today  -     LTG 3  Patient will transfer floor to and from stand with UE support as needed and supervision.  -     LTG 3 Progress  Not Met discontinue goal due to increased back pain with transfers  -       User Key  (r) = Recorded By, (t) = Taken By, (c) = Cosigned By    Initials Name Provider Type     Renay Silver, PT Physical Therapist          Therapy Education  Education Details: Answered pt's questions for why have him do  ex's on foam pad and look side to side   Program: Reinforced  How Provided: Verbal  Provided to: Patient  Level of Understanding: Verbalized    Outcome Measure Options: 6 Minute Walk Test, Timed Up and Go (TUG)  Timed Up and Go (TUG)  TUG Test 1: 14.11 seconds  TUG Test 2: 13.91 seconds      Time Calculation:   Start Time: 1400  Stop Time: 1458  Time Calculation (min): 58 min  Therapy Charges for Today     Code Description Service Date Service Provider Modifiers Qty    [id number] HC PT NEUROMUSC RE EDUCATION EA 15 MIN 4/5/2019 Coy Rojas, PTA GP 3          PT G-Codes  Outcome Measure Options: 6 Minute Walk Test, Timed Up and Go (TUG)  TUG Test 1: 14.11 seconds  TUG Test 2: 13.91 seconds         Coy Rojas PTA  4/5/2019

## 2019-04-08 ENCOUNTER — DOCUMENTATION (OUTPATIENT)
Dept: PHYSICAL THERAPY | Facility: HOSPITAL | Age: 79
End: 2019-04-08

## 2019-04-08 DIAGNOSIS — R41.89 COGNITIVE DECLINE: ICD-10-CM

## 2019-04-08 DIAGNOSIS — Z74.09 IMPAIRED FUNCTIONAL MOBILITY, BALANCE, GAIT, AND ENDURANCE: ICD-10-CM

## 2019-04-08 DIAGNOSIS — R29.6 MULTIPLE FALLS: Primary | ICD-10-CM

## 2019-04-08 NOTE — THERAPY DISCHARGE NOTE
Outpatient Physical Therapy Discharge Summary         Patient Name: Zach Gutierrez  : 1940  MRN: [id number]    Today's Date: 2019    Visit Dx:    ICD-10-CM ICD-9-CM   1. Multiple falls R29.6 V15.88   2. Impaired functional mobility, balance, gait, and endurance Z74.09 V49.89   3. Cognitive decline R41.89 294.9       PT OP Goals     Row Name 19 1000          PT Short Term Goals    STG Date to Achieve  19  -     STG 1  Patient/caregivers to be independent with HEP in order to maintain and improve upon gains made in therapy.  -     STG 1 Progress  Met  -     STG 2  Patient will ambulate at least 1200 feet in 6 minute walk test to demonstrate increased gait speed and safety with mobility.  -     STG 2 Progress  Met  -     STG 3  patient will ambulate at least 500 feet over outdoor grassy, unlevel surfaces with CGA.  -[facility] 3 Progress  Met per patient and wife report  -        Long Term Goals    LTG Date to Achieve  04/10/19  -     LTG 1  Patient will perform Timed Up and Go in less than 15 seconds to demonstrate increased speed with transfers and gait and improved safety with functional mobility.  -     LTG 1 Progress  Met  -     LTG 2  Patient will ambulate outdoor with supervision, at least 10 minutes to demonstrate safety with supervised community mobility.  -     LTG 2 Progress  Met per wife report - patient declined outdoor gait today  -     LTG 3  Patient will transfer floor to and from stand with UE support as needed and supervision.  -     LTG 3 Progress  Not Met discontinue goal due to increased back pain with transfers  -       User Key  (r) = Recorded By, (t) = Taken By, (c) = Cosigned By    Initials Name Provider Type     Renay Silver, PT Physical Therapist          OP PT Discharge Summary  Date of Discharge: 19  Reason for Discharge: Maximum functional potential achieved  Outcomes Achieved: Patient able to partially acheive  established goals  Discharge Destination: Home with home program, Home with caregiver assist  Discharge Instructions/Additional Comments: recommend 24 hour assist due to impaired cognition    Renay Silver, PT  4/8/2019

## 2019-04-29 RX ORDER — PANTOPRAZOLE SODIUM 40 MG/1
TABLET, DELAYED RELEASE ORAL
Qty: 90 TABLET | Refills: 0 | OUTPATIENT
Start: 2019-04-29

## 2019-05-02 RX ORDER — PANTOPRAZOLE SODIUM 40 MG/1
TABLET, DELAYED RELEASE ORAL
Qty: 90 TABLET | Refills: 0 | OUTPATIENT
Start: 2019-05-02

## 2019-06-07 ENCOUNTER — OFFICE VISIT (OUTPATIENT)
Dept: FAMILY MEDICINE CLINIC | Facility: CLINIC | Age: 79
End: 2019-06-07

## 2019-06-07 VITALS
RESPIRATION RATE: 14 BRPM | OXYGEN SATURATION: 97 % | BODY MASS INDEX: 29.82 KG/M2 | DIASTOLIC BLOOD PRESSURE: 64 MMHG | TEMPERATURE: 97.7 F | SYSTOLIC BLOOD PRESSURE: 116 MMHG | WEIGHT: 213 LBS | HEIGHT: 71 IN | HEART RATE: 54 BPM

## 2019-06-07 DIAGNOSIS — F02.80 LATE ONSET ALZHEIMER'S DISEASE WITHOUT BEHAVIORAL DISTURBANCE (HCC): ICD-10-CM

## 2019-06-07 DIAGNOSIS — G30.1 LATE ONSET ALZHEIMER'S DISEASE WITHOUT BEHAVIORAL DISTURBANCE (HCC): ICD-10-CM

## 2019-06-07 DIAGNOSIS — G31.84 MILD COGNITIVE IMPAIRMENT WITH MEMORY LOSS: ICD-10-CM

## 2019-06-07 DIAGNOSIS — E11.9 TYPE 2 DIABETES MELLITUS WITHOUT COMPLICATION, WITHOUT LONG-TERM CURRENT USE OF INSULIN (HCC): Primary | ICD-10-CM

## 2019-06-07 DIAGNOSIS — E78.5 DYSLIPIDEMIA: ICD-10-CM

## 2019-06-07 PROBLEM — G30.9 ALZHEIMER'S DISEASE (HCC): Status: ACTIVE | Noted: 2019-06-07

## 2019-06-07 PROBLEM — M43.00 ACQUIRED SPONDYLOLYSIS: Status: ACTIVE | Noted: 2019-06-07

## 2019-06-07 PROBLEM — M79.609 EXTREMITY PAIN: Status: ACTIVE | Noted: 2019-06-07

## 2019-06-07 PROBLEM — M48.061 LUMBAR CANAL STENOSIS: Status: ACTIVE | Noted: 2019-06-07

## 2019-06-07 LAB
ALBUMIN SERPL-MCNC: 4.4 G/DL (ref 3.5–5.2)
ALBUMIN/GLOB SERPL: 1.6 G/DL
ALP SERPL-CCNC: 100 U/L (ref 39–117)
ALT SERPL-CCNC: 12 U/L (ref 1–41)
AST SERPL-CCNC: 19 U/L (ref 1–40)
BASOPHILS # BLD AUTO: 0.04 10*3/MM3 (ref 0–0.2)
BASOPHILS NFR BLD AUTO: 0.6 % (ref 0–1.5)
BILIRUB SERPL-MCNC: 0.4 MG/DL (ref 0.2–1.2)
BUN SERPL-MCNC: 15 MG/DL (ref 8–23)
BUN/CREAT SERPL: 16.5 (ref 7–25)
CALCIUM SERPL-MCNC: 9.7 MG/DL (ref 8.6–10.5)
CHLORIDE SERPL-SCNC: 103 MMOL/L (ref 98–107)
CHOLEST SERPL-MCNC: 160 MG/DL (ref 0–200)
CHOLEST/HDLC SERPL: 3.27 {RATIO}
CK SERPL-CCNC: 107 U/L (ref 20–200)
CO2 SERPL-SCNC: 29.4 MMOL/L (ref 22–29)
CREAT SERPL-MCNC: 0.91 MG/DL (ref 0.76–1.27)
EOSINOPHIL # BLD AUTO: 0.09 10*3/MM3 (ref 0–0.4)
EOSINOPHIL NFR BLD AUTO: 1.3 % (ref 0.3–6.2)
ERYTHROCYTE [DISTWIDTH] IN BLOOD BY AUTOMATED COUNT: 12.6 % (ref 12.3–15.4)
GLOBULIN SER CALC-MCNC: 2.7 GM/DL
GLUCOSE SERPL-MCNC: 168 MG/DL (ref 65–99)
HBA1C MFR BLD: 8 % (ref 4.8–5.6)
HCT VFR BLD AUTO: 43.7 % (ref 37.5–51)
HDLC SERPL-MCNC: 49 MG/DL (ref 40–60)
HGB BLD-MCNC: 14.2 G/DL (ref 13–17.7)
IMM GRANULOCYTES # BLD AUTO: 0.04 10*3/MM3 (ref 0–0.05)
IMM GRANULOCYTES NFR BLD AUTO: 0.6 % (ref 0–0.5)
LDLC SERPL CALC-MCNC: 81 MG/DL (ref 0–100)
LYMPHOCYTES # BLD AUTO: 1.44 10*3/MM3 (ref 0.7–3.1)
LYMPHOCYTES NFR BLD AUTO: 20.6 % (ref 19.6–45.3)
MCH RBC QN AUTO: 31.8 PG (ref 26.6–33)
MCHC RBC AUTO-ENTMCNC: 32.5 G/DL (ref 31.5–35.7)
MCV RBC AUTO: 97.8 FL (ref 79–97)
MONOCYTES # BLD AUTO: 0.46 10*3/MM3 (ref 0.1–0.9)
MONOCYTES NFR BLD AUTO: 6.6 % (ref 5–12)
NEUTROPHILS # BLD AUTO: 4.91 10*3/MM3 (ref 1.7–7)
NEUTROPHILS NFR BLD AUTO: 70.3 % (ref 42.7–76)
NRBC BLD AUTO-RTO: 0 /100 WBC (ref 0–0.2)
PLATELET # BLD AUTO: 244 10*3/MM3 (ref 140–450)
POTASSIUM SERPL-SCNC: 4.5 MMOL/L (ref 3.5–5.2)
PROT SERPL-MCNC: 7.1 G/DL (ref 6–8.5)
RBC # BLD AUTO: 4.47 10*6/MM3 (ref 4.14–5.8)
SODIUM SERPL-SCNC: 141 MMOL/L (ref 136–145)
TRIGL SERPL-MCNC: 148 MG/DL (ref 0–150)
TSH SERPL DL<=0.005 MIU/L-ACNC: 2.08 MIU/ML (ref 0.27–4.2)
VLDLC SERPL CALC-MCNC: 29.6 MG/DL
WBC # BLD AUTO: 6.98 10*3/MM3 (ref 3.4–10.8)

## 2019-06-07 PROCEDURE — 99213 OFFICE O/P EST LOW 20 MIN: CPT | Performed by: INTERNAL MEDICINE

## 2019-06-07 RX ORDER — MOXIFLOXACIN 5 MG/ML
SOLUTION/ DROPS OPHTHALMIC
Refills: 1 | COMMUNITY
Start: 2019-05-07 | End: 2019-10-06 | Stop reason: HOSPADM

## 2019-06-07 RX ORDER — PREDNISOLONE ACETATE 10 MG/ML
SUSPENSION/ DROPS OPHTHALMIC
Refills: 1 | COMMUNITY
Start: 2019-05-07 | End: 2019-10-06 | Stop reason: HOSPADM

## 2019-06-07 RX ORDER — NEPAFENAC 0.3 %
SUSPENSION, DROPS(FINAL DOSAGE FORM)(ML) OPHTHALMIC (EYE)
Refills: 1 | COMMUNITY
Start: 2019-05-07 | End: 2019-10-06 | Stop reason: HOSPADM

## 2019-06-07 NOTE — PROGRESS NOTES
AMANDA@  Zach Gutierrez is a 78 y.o. male.     Chief Complaint   Patient presents with   • Diabetes   • Hyperlipidemia   • Dementia       History of Present Illness   Patient here for follow-up for diabetes, hyperlipidemia and dementia.  Denies any chest pain shortness of breath.  Dieting exercising.  Per wife his dementia is getting worse.  But not alarmingly, is gradually worsening.  Patient sees neurologist.  Already on Namenda and Aricept.  Wife took off aspirin as he fell and had bleeding.  The following portions of the patient's history were reviewed and updated as appropriate: allergies, current medications, past family history, past medical history, past social history, past surgical history and problem list.    Review of Systems   Constitutional: Negative for activity change, appetite change, diaphoresis and unexpected weight gain.   Eyes: Negative for blurred vision and double vision.   Respiratory: Negative for shortness of breath.    Cardiovascular: Negative for chest pain, palpitations and leg swelling.   Skin: Negative for skin lesions.   Neurological: Negative for dizziness, syncope, numbness and headache.       No Known Allergies    Current Outpatient Medications on File Prior to Visit   Medication Sig Dispense Refill   • aspirin 81 MG tablet Take 81 mg by mouth Daily.     • donepezil (ARICEPT) 10 MG tablet Take 10 mg by mouth Every Night.     • fexofenadine (ALLEGRA) 180 MG tablet Take 180 mg by mouth Daily.     • finasteride (PROSCAR) 5 MG tablet Take 5 mg by mouth Every Night.     • ILEVRO 0.3 % suspension 1 DROP RIGHT EYE DAILY STARTING THE DAY OF SURGERY FOR 2 WEEKS THEN STOP  1   • memantine (NAMENDA XR) 28 MG capsule sustained-release 24 hr extended release capsule Take 28 mg by mouth Every Night.     • metFORMIN (GLUCOPHAGE) 500 MG tablet TAKE 2 TABLETS TWICE A  tablet 2   • moxifloxacin (VIGAMOX) 0.5 % ophthalmic solution PLEASE SEE ATTACHED FOR DETAILED DIRECTIONS  1   •  Multiple Vitamin (MULTIVITAMIN) tablet Take 1 tablet by mouth Daily.     • prednisoLONE acetate (PRED FORTE) 1 % ophthalmic suspension PLEASE SEE ATTACHED FOR DETAILED DIRECTIONS  1   • RAPAFLO 8 MG capsule capsule 8 mg Daily With Breakfast.     • sertraline (ZOLOFT) 25 MG tablet Take 25 mg by mouth Every Morning.     • [DISCONTINUED] pantoprazole (PROTONIX) 40 MG EC tablet TAKE 1 TABLET BY MOUTH EVERY DAY 90 tablet 0     No current facility-administered medications on file prior to visit.        History reviewed. No pertinent family history.    Past Medical History:   Diagnosis Date   • Arthritis     BACK PAIN   • Dementia    • Diabetes (CMS/HCC)     TYPE 2   • Knee sprain    • ZENA (obstructive sleep apnea)     cpap       Past Surgical History:   Procedure Laterality Date   • COLONOSCOPY N/A 5/19/2017    Procedure: COLONOSCOPY;  Surgeon: Srikanth Payan MD;  Location: [facility] OR;  Service:    • CYSTOSCOPY TRANSURETHRAL RESECTION OF PROSTATE N/A 10/16/2017    Procedure: CYSTOSCOPY TRANSURETHRAL RESECTION OF PROSTATE;  Surgeon: Basil Yang MD;  Location: [facility] OR;  Service:    • HERNIA REPAIR Bilateral     inguinal   • [place] TOTAL KNEE ARTHROPLASTY Right 6/20/2017    Procedure: TOTAL KNEE ARTHROPLASTY hima orthoalign antibiotic cement 7fr hemovac placement;  Surgeon: Bj Thrasher MD;  Location: [facility] OR;  Service: Orthopedics   • SHOULDER SURGERY Right 2007    RCR       Social History     Socioeconomic History   • Marital status:      Spouse name: Not on file   • Number of children: Not on file   • Years of education: Not on file   • Highest education level: Not on file   Tobacco Use   • Smoking status: Never Smoker   • Smokeless tobacco: Never Used   Substance and Sexual Activity   • Alcohol use: Yes     Comment: one jim/week   • Drug use: No   • Sexual activity: Defer       Patient Active Problem List   Diagnosis   • Osteoarthritis of right knee   • Status post total right knee  replacement   • BPH with obstruction/lower urinary tract symptoms   • Obstructive sleep apnea, adult   • Gastroesophageal reflux disease without esophagitis   • Type 2 diabetes mellitus without complication, without long-term current use of insulin (CMS/HCC)   • Acquired spondylolysis   • Dyslipidemia   • Extremity pain   • Lumbar canal stenosis   • Hematuria   • Mild cognitive impairment with memory loss   • Sleep apnea   • Alzheimer's disease       Vitals:    06/07/19 0948   BP: 116/64   Pulse: 54   Resp: 14   Temp: 97.7 °F (36.5 °C)   SpO2: 97%       Objective   Physical Exam   Constitutional: He is oriented to person, place, and time. He appears well-developed.   Eyes: Pupils are equal, round, and reactive to light.   Neck: Normal range of motion. Neck supple. No JVD present. No tracheal deviation present. No thyromegaly present.   Cardiovascular: Normal rate, regular rhythm and normal heart sounds. Exam reveals no gallop and no friction rub.   No murmur heard.  Pulmonary/Chest: Effort normal and breath sounds normal. No respiratory distress. He has no wheezes.   Abdominal: Soft. Bowel sounds are normal. He exhibits no distension and no mass. There is no tenderness. There is no rebound and no guarding. No hernia.   Musculoskeletal: Normal range of motion.   Lymphadenopathy:     He has no cervical adenopathy.   Neurological: He is alert and oriented to person, place, and time. He displays normal reflexes. No cranial nerve deficit or sensory deficit. He exhibits normal muscle tone.   He is oriented but slow.   Psychiatric: He has a normal mood and affect. His behavior is normal.         Assessment/Plan   Zach was seen today for diabetes, hyperlipidemia and dementia.    Diagnoses and all orders for this visit:    Type 2 diabetes mellitus without complication, without long-term current use of insulin (CMS/[facility])  -     Comprehensive Metabolic Panel  -     CK  -     Hemoglobin A1c  -     Lipid Panel With / Chol / HDL  Ratio  -     TSH  -     CBC & Differential    Mild cognitive impairment with memory loss  -     Comprehensive Metabolic Panel  -     CK  -     Hemoglobin A1c  -     Lipid Panel With / Chol / HDL Ratio  -     TSH  -     CBC & Differential    Dyslipidemia  -     Comprehensive Metabolic Panel  -     CK  -     Hemoglobin A1c  -     Lipid Panel With / Chol / HDL Ratio  -     TSH  -     CBC & Differential    Late onset Alzheimer's disease without behavioral disturbance    Continue medication.  Continue to check blood sugars.  All his problems are stable.  As expected dementia getting worse.  Patient's already sees neurologist.  Continue medication.  Return in 3 months.

## 2019-09-09 ENCOUNTER — OFFICE VISIT (OUTPATIENT)
Dept: FAMILY MEDICINE CLINIC | Facility: CLINIC | Age: 79
End: 2019-09-09

## 2019-09-09 VITALS
WEIGHT: 209 LBS | HEIGHT: 71 IN | DIASTOLIC BLOOD PRESSURE: 66 MMHG | OXYGEN SATURATION: 98 % | SYSTOLIC BLOOD PRESSURE: 118 MMHG | RESPIRATION RATE: 14 BRPM | HEART RATE: 58 BPM | TEMPERATURE: 98.1 F | BODY MASS INDEX: 29.26 KG/M2

## 2019-09-09 DIAGNOSIS — E11.9 TYPE 2 DIABETES MELLITUS WITHOUT COMPLICATION, WITHOUT LONG-TERM CURRENT USE OF INSULIN (HCC): ICD-10-CM

## 2019-09-09 DIAGNOSIS — K21.9 GASTROESOPHAGEAL REFLUX DISEASE WITHOUT ESOPHAGITIS: Primary | ICD-10-CM

## 2019-09-09 DIAGNOSIS — G30.1 LATE ONSET ALZHEIMER'S DISEASE WITHOUT BEHAVIORAL DISTURBANCE (HCC): ICD-10-CM

## 2019-09-09 DIAGNOSIS — F02.80 LATE ONSET ALZHEIMER'S DISEASE WITHOUT BEHAVIORAL DISTURBANCE (HCC): ICD-10-CM

## 2019-09-09 PROCEDURE — 99213 OFFICE O/P EST LOW 20 MIN: CPT | Performed by: INTERNAL MEDICINE

## 2019-09-09 PROCEDURE — 90653 IIV ADJUVANT VACCINE IM: CPT | Performed by: INTERNAL MEDICINE

## 2019-09-09 PROCEDURE — G0008 ADMIN INFLUENZA VIRUS VAC: HCPCS | Performed by: INTERNAL MEDICINE

## 2019-09-09 NOTE — PROGRESS NOTES
AMANDA@  Zach Gutierrez is a 79 y.o. male.     Chief Complaint   Patient presents with   • Diabetes   • Hyperlipidemia   • Alzheimer's Disease       History of Present Illness   Follow-up for hyperlipidemia, Alzheimer's disease, diabetes.  Reports blood sugar under control.  Patient at times has fallen.  She took him off aspirin has been doing fine.  Taking Aricept and Namenda.  Dementia symptoms slowly getting worse.  Patient is usually under supervision by somebody at home.  No chest pain or shortness of breath.  The following portions of the patient's history were reviewed and updated as appropriate: allergies, current medications, past family history, past medical history, past social history, past surgical history and problem list.    Review of Systems   Constitutional: Negative for activity change, appetite change, diaphoresis and unexpected weight gain.   Eyes: Negative for blurred vision and double vision.   Respiratory: Negative for shortness of breath.    Cardiovascular: Negative for chest pain, palpitations and leg swelling.   Gastrointestinal: Negative.    Skin: Negative for skin lesions.   Neurological: Negative for dizziness, syncope, numbness and headache.        Dementia getting worse slowly       No Known Allergies    Current Outpatient Medications on File Prior to Visit   Medication Sig Dispense Refill   • donepezil (ARICEPT) 10 MG tablet Take 10 mg by mouth Every Night.     • fexofenadine (ALLEGRA) 180 MG tablet Take 180 mg by mouth Daily.     • finasteride (PROSCAR) 5 MG tablet Take 5 mg by mouth Every Night.     • memantine (NAMENDA XR) 28 MG capsule sustained-release 24 hr extended release capsule Take 28 mg by mouth Every Night.     • metFORMIN (GLUCOPHAGE) 500 MG tablet TAKE 2 TABLETS TWICE A  tablet 2   • Multiple Vitamin (MULTIVITAMIN) tablet Take 1 tablet by mouth Daily.     • sertraline (ZOLOFT) 25 MG tablet Take 25 mg by mouth Every Morning.     • aspirin 81 MG tablet  Take 81 mg by mouth Daily.     • ILEVRO 0.3 % suspension 1 DROP RIGHT EYE DAILY STARTING THE DAY OF SURGERY FOR 2 WEEKS THEN STOP  1   • moxifloxacin (VIGAMOX) 0.5 % ophthalmic solution PLEASE SEE ATTACHED FOR DETAILED DIRECTIONS  1   • prednisoLONE acetate (PRED FORTE) 1 % ophthalmic suspension PLEASE SEE ATTACHED FOR DETAILED DIRECTIONS  1   • RAPAFLO 8 MG capsule capsule 8 mg Daily With Breakfast.       No current facility-administered medications on file prior to visit.        History reviewed. No pertinent family history.    Past Medical History:   Diagnosis Date   • Arthritis     BACK PAIN   • Dementia    • Diabetes (CMS/HCC)     TYPE 2   • Knee sprain    • EZNA (obstructive sleep apnea)     cpap       Past Surgical History:   Procedure Laterality Date   • COLONOSCOPY N/A 5/19/2017    Procedure: COLONOSCOPY;  Surgeon: Srikanth Payan MD;  Location: [facility] OR;  Service:    • CYSTOSCOPY TRANSURETHRAL RESECTION OF PROSTATE N/A 10/16/2017    Procedure: CYSTOSCOPY TRANSURETHRAL RESECTION OF PROSTATE;  Surgeon: Basil Yang MD;  Location: [facility] OR;  Service:    • HERNIA REPAIR Bilateral     inguinal   • [place] TOTAL KNEE ARTHROPLASTY Right 6/20/2017    Procedure: TOTAL KNEE ARTHROPLASTY hima orthoalign antibiotic cement 7fr hemovac placement;  Surgeon: Bj Thrasher MD;  Location: [facility] OR;  Service: Orthopedics   • SHOULDER SURGERY Right 2007    RCR       Social History     Socioeconomic History   • Marital status:      Spouse name: Not on file   • Number of children: Not on file   • Years of education: Not on file   • Highest education level: Not on file   Tobacco Use   • Smoking status: Never Smoker   • Smokeless tobacco: Never Used   Substance and Sexual Activity   • Alcohol use: Yes     Comment: one jim/week   • Drug use: No   • Sexual activity: Defer       Patient Active Problem List   Diagnosis   • Osteoarthritis of right knee   • Status post total right knee replacement   • BPH  with obstruction/lower urinary tract symptoms   • Obstructive sleep apnea, adult   • Gastroesophageal reflux disease without esophagitis   • Type 2 diabetes mellitus without complication, without long-term current use of insulin (CMS/HCC)   • Acquired spondylolysis   • Dyslipidemia   • Extremity pain   • Lumbar canal stenosis   • Hematuria   • Mild cognitive impairment with memory loss   • Sleep apnea   • Alzheimer's disease       Vitals:    09/09/19 0906   BP: 118/66   Pulse: 58   Resp: 14   Temp: 98.1 °F (36.7 °C)   SpO2: 98%       Objective   Physical Exam   Constitutional: He is oriented to person, place, and time. He appears well-developed and well-nourished.   Eyes: EOM are normal. Pupils are equal, round, and reactive to light.   Neck: Normal range of motion. Neck supple. No JVD present. No tracheal deviation present. No thyromegaly present.   Cardiovascular: Normal rate and regular rhythm.   No murmur heard.  Pulmonary/Chest: Effort normal and breath sounds normal. No respiratory distress. He has no wheezes.   Abdominal: Soft. Bowel sounds are normal. He exhibits no distension. There is no tenderness.   Musculoskeletal: Normal range of motion.   Neurological: He is alert and oriented to person, place, and time. He displays normal reflexes. No cranial nerve deficit. Coordination normal.   Psychiatric: He has a normal mood and affect. His behavior is normal.   Behavior normal although very slow   Nursing note and vitals reviewed.        Assessment/Plan   Zach was seen today for diabetes, hyperlipidemia and alzheimer's disease.    Diagnoses and all orders for this visit:    Gastroesophageal reflux disease without esophagitis  -     Comprehensive Metabolic Panel  -     CK  -     Hemoglobin A1c  -     Lipid Panel With / Chol / HDL Ratio    Type 2 diabetes mellitus without complication, without long-term current use of insulin (CMS/HCC)  -     Comprehensive Metabolic Panel  -     CK  -     Hemoglobin A1c  -      Lipid Panel With / Chol / HDL Ratio    Late onset Alzheimer's disease without behavioral disturbance  -     Comprehensive Metabolic Panel  -     CK  -     Hemoglobin A1c  -     Lipid Panel With / Chol / HDL Ratio    Other orders  -     Fluad Quad >65 years (0271-6490)    Continue metformin 500 twice a day, Namenda and Aricept.  Check blood sugars.  Do not intend to control diabetes tightly.  Continue supervision.  Patient has large support system.  All his problems are chronic and stable except for dementia slowly getting worse  As expected.  Return in 3 months time.

## 2019-09-10 LAB
ALBUMIN SERPL-MCNC: 4.3 G/DL (ref 3.5–5.2)
ALBUMIN/GLOB SERPL: 1.5 G/DL
ALP SERPL-CCNC: 88 U/L (ref 39–117)
ALT SERPL-CCNC: 15 U/L (ref 1–41)
AST SERPL-CCNC: 16 U/L (ref 1–40)
BILIRUB SERPL-MCNC: 0.5 MG/DL (ref 0.2–1.2)
BUN SERPL-MCNC: 14 MG/DL (ref 8–23)
BUN/CREAT SERPL: 17.1 (ref 7–25)
CALCIUM SERPL-MCNC: 9.2 MG/DL (ref 8.6–10.5)
CHLORIDE SERPL-SCNC: 101 MMOL/L (ref 98–107)
CHOLEST SERPL-MCNC: 145 MG/DL (ref 0–200)
CHOLEST/HDLC SERPL: 3.02 {RATIO}
CK SERPL-CCNC: 26 U/L (ref 20–200)
CO2 SERPL-SCNC: 27.3 MMOL/L (ref 22–29)
CREAT SERPL-MCNC: 0.82 MG/DL (ref 0.76–1.27)
GLOBULIN SER CALC-MCNC: 2.8 GM/DL
GLUCOSE SERPL-MCNC: 147 MG/DL (ref 65–99)
HBA1C MFR BLD: 7.9 % (ref 4.8–5.6)
HDLC SERPL-MCNC: 48 MG/DL (ref 40–60)
LDLC SERPL CALC-MCNC: 63 MG/DL (ref 0–100)
POTASSIUM SERPL-SCNC: 4.5 MMOL/L (ref 3.5–5.2)
PROT SERPL-MCNC: 7.1 G/DL (ref 6–8.5)
SODIUM SERPL-SCNC: 141 MMOL/L (ref 136–145)
TRIGL SERPL-MCNC: 172 MG/DL (ref 0–150)
VLDLC SERPL CALC-MCNC: 34.4 MG/DL

## 2019-09-30 ENCOUNTER — HOSPITAL ENCOUNTER (INPATIENT)
Facility: HOSPITAL | Age: 79
LOS: 6 days | Discharge: HOME | End: 2019-10-06
Attending: EMERGENCY MEDICINE | Admitting: INTERNAL MEDICINE

## 2019-09-30 ENCOUNTER — APPOINTMENT (OUTPATIENT)
Dept: CT IMAGING | Facility: HOSPITAL | Age: 79
End: 2019-09-30

## 2019-09-30 DIAGNOSIS — K57.92 ACUTE DIVERTICULITIS: Primary | ICD-10-CM

## 2019-09-30 LAB
ALBUMIN SERPL-MCNC: 4.5 G/DL (ref 3.5–5.2)
ALBUMIN/GLOB SERPL: 1.6 G/DL
ALP SERPL-CCNC: 97 U/L (ref 39–117)
ALT SERPL W P-5'-P-CCNC: 12 U/L (ref 1–41)
ANION GAP SERPL CALCULATED.3IONS-SCNC: 11.7 MMOL/L (ref 5–15)
AST SERPL-CCNC: 14 U/L (ref 1–40)
BACTERIA UR QL AUTO: ABNORMAL /HPF
BASOPHILS # BLD AUTO: 0.07 10*3/MM3 (ref 0–0.2)
BASOPHILS NFR BLD AUTO: 0.4 % (ref 0–1.5)
BILIRUB SERPL-MCNC: 0.4 MG/DL (ref 0.2–1.2)
BILIRUB UR QL STRIP: NEGATIVE
BUN BLD-MCNC: 17 MG/DL (ref 8–23)
BUN/CREAT SERPL: 17.5 (ref 7–25)
CALCIUM SPEC-SCNC: 9.7 MG/DL (ref 8.6–10.5)
CHLORIDE SERPL-SCNC: 99 MMOL/L (ref 98–107)
CLARITY UR: CLEAR
CO2 SERPL-SCNC: 29.3 MMOL/L (ref 22–29)
COLOR UR: YELLOW
CREAT BLD-MCNC: 0.97 MG/DL (ref 0.76–1.27)
DEPRECATED RDW RBC AUTO: 42.1 FL (ref 37–54)
EOSINOPHIL # BLD AUTO: 0.03 10*3/MM3 (ref 0–0.4)
EOSINOPHIL NFR BLD AUTO: 0.2 % (ref 0.3–6.2)
ERYTHROCYTE [DISTWIDTH] IN BLOOD BY AUTOMATED COUNT: 12.2 % (ref 12.3–15.4)
GFR SERPL CREATININE-BSD FRML MDRD: 75 ML/MIN/1.73
GLOBULIN UR ELPH-MCNC: 2.8 GM/DL
GLUCOSE BLD-MCNC: 291 MG/DL (ref 65–99)
GLUCOSE UR STRIP-MCNC: ABNORMAL MG/DL
HCT VFR BLD AUTO: 45.4 % (ref 37.5–51)
HGB BLD-MCNC: 15.7 G/DL (ref 13–17.7)
HGB UR QL STRIP.AUTO: ABNORMAL
HYALINE CASTS UR QL AUTO: ABNORMAL /LPF
IMM GRANULOCYTES # BLD AUTO: 0.09 10*3/MM3 (ref 0–0.05)
IMM GRANULOCYTES NFR BLD AUTO: 0.5 % (ref 0–0.5)
KETONES UR QL STRIP: ABNORMAL
LEUKOCYTE ESTERASE UR QL STRIP.AUTO: NEGATIVE
LYMPHOCYTES # BLD AUTO: 0.71 10*3/MM3 (ref 0.7–3.1)
LYMPHOCYTES NFR BLD AUTO: 4 % (ref 19.6–45.3)
MCH RBC QN AUTO: 32.3 PG (ref 26.6–33)
MCHC RBC AUTO-ENTMCNC: 34.6 G/DL (ref 31.5–35.7)
MCV RBC AUTO: 93.4 FL (ref 79–97)
MONOCYTES # BLD AUTO: 0.78 10*3/MM3 (ref 0.1–0.9)
MONOCYTES NFR BLD AUTO: 4.4 % (ref 5–12)
NEUTROPHILS # BLD AUTO: 15.93 10*3/MM3 (ref 1.7–7)
NEUTROPHILS NFR BLD AUTO: 90.5 % (ref 42.7–76)
NITRITE UR QL STRIP: NEGATIVE
NRBC BLD AUTO-RTO: 0 /100 WBC (ref 0–0.2)
PH UR STRIP.AUTO: 6.5 [PH] (ref 4.5–8)
PLATELET # BLD AUTO: 266 10*3/MM3 (ref 140–450)
PMV BLD AUTO: 9.1 FL (ref 6–12)
POTASSIUM BLD-SCNC: 4 MMOL/L (ref 3.5–5.2)
PROT SERPL-MCNC: 7.3 G/DL (ref 6–8.5)
PROT UR QL STRIP: NEGATIVE
RBC # BLD AUTO: 4.86 10*6/MM3 (ref 4.14–5.8)
RBC # UR: ABNORMAL /HPF
REF LAB TEST METHOD: ABNORMAL
SODIUM BLD-SCNC: 140 MMOL/L (ref 136–145)
SP GR UR STRIP: 1.01 (ref 1–1.03)
SQUAMOUS #/AREA URNS HPF: ABNORMAL /HPF
UROBILINOGEN UR QL STRIP: ABNORMAL
WBC NRBC COR # BLD: 17.61 10*3/MM3 (ref 3.4–10.8)
WBC UR QL AUTO: ABNORMAL /HPF

## 2019-09-30 PROCEDURE — 99222 1ST HOSP IP/OBS MODERATE 55: CPT | Performed by: HOSPITALIST

## 2019-09-30 PROCEDURE — 83036 HEMOGLOBIN GLYCOSYLATED A1C: CPT | Performed by: HOSPITALIST

## 2019-09-30 PROCEDURE — 83735 ASSAY OF MAGNESIUM: CPT | Performed by: HOSPITALIST

## 2019-09-30 PROCEDURE — 25010000002 LEVOFLOXACIN PER 250 MG: Performed by: PHYSICIAN ASSISTANT

## 2019-09-30 PROCEDURE — 81001 URINALYSIS AUTO W/SCOPE: CPT | Performed by: PHYSICIAN ASSISTANT

## 2019-09-30 PROCEDURE — 99283 EMERGENCY DEPT VISIT LOW MDM: CPT

## 2019-09-30 PROCEDURE — 99284 EMERGENCY DEPT VISIT MOD MDM: CPT | Performed by: PHYSICIAN ASSISTANT

## 2019-09-30 PROCEDURE — 80053 COMPREHEN METABOLIC PANEL: CPT | Performed by: PHYSICIAN ASSISTANT

## 2019-09-30 PROCEDURE — 74176 CT ABD & PELVIS W/O CONTRAST: CPT

## 2019-09-30 PROCEDURE — 85025 COMPLETE CBC W/AUTO DIFF WBC: CPT | Performed by: PHYSICIAN ASSISTANT

## 2019-09-30 RX ORDER — SODIUM CHLORIDE 0.9 % (FLUSH) 0.9 %
10 SYRINGE (ML) INJECTION AS NEEDED
Status: DISCONTINUED | OUTPATIENT
Start: 2019-09-30 | End: 2019-10-06 | Stop reason: HOSPADM

## 2019-09-30 RX ORDER — LEVOFLOXACIN 5 MG/ML
750 INJECTION, SOLUTION INTRAVENOUS ONCE
Status: COMPLETED | OUTPATIENT
Start: 2019-09-30 | End: 2019-10-01

## 2019-09-30 RX ORDER — SODIUM CHLORIDE 9 MG/ML
125 INJECTION, SOLUTION INTRAVENOUS CONTINUOUS
Status: DISCONTINUED | OUTPATIENT
Start: 2019-09-30 | End: 2019-10-02

## 2019-09-30 RX ORDER — CEPHALEXIN 500 MG/1
500 CAPSULE ORAL 3 TIMES DAILY
COMMUNITY
End: 2019-10-06 | Stop reason: HOSPADM

## 2019-09-30 RX ADMIN — LEVOFLOXACIN 750 MG: 5 INJECTION, SOLUTION INTRAVENOUS at 22:36

## 2019-09-30 RX ADMIN — SODIUM CHLORIDE 125 ML/HR: 9 INJECTION, SOLUTION INTRAVENOUS at 22:39

## 2019-10-01 LAB
ALBUMIN SERPL-MCNC: 4 G/DL (ref 3.5–5.2)
ALBUMIN/GLOB SERPL: 1.5 G/DL
ALP SERPL-CCNC: 79 U/L (ref 39–117)
ALT SERPL W P-5'-P-CCNC: 10 U/L (ref 1–41)
ANION GAP SERPL CALCULATED.3IONS-SCNC: 14.1 MMOL/L (ref 5–15)
AST SERPL-CCNC: 13 U/L (ref 1–40)
BASOPHILS # BLD AUTO: 0.04 10*3/MM3 (ref 0–0.2)
BASOPHILS NFR BLD AUTO: 0.3 % (ref 0–1.5)
BILIRUB SERPL-MCNC: 0.6 MG/DL (ref 0.2–1.2)
BUN BLD-MCNC: 17 MG/DL (ref 8–23)
BUN/CREAT SERPL: 21 (ref 7–25)
CALCIUM SPEC-SCNC: 9.1 MG/DL (ref 8.6–10.5)
CHLORIDE SERPL-SCNC: 103 MMOL/L (ref 98–107)
CO2 SERPL-SCNC: 23.9 MMOL/L (ref 22–29)
CREAT BLD-MCNC: 0.81 MG/DL (ref 0.76–1.27)
DEPRECATED RDW RBC AUTO: 42.2 FL (ref 37–54)
EOSINOPHIL # BLD AUTO: 0 10*3/MM3 (ref 0–0.4)
EOSINOPHIL NFR BLD AUTO: 0 % (ref 0.3–6.2)
ERYTHROCYTE [DISTWIDTH] IN BLOOD BY AUTOMATED COUNT: 12.2 % (ref 12.3–15.4)
GFR SERPL CREATININE-BSD FRML MDRD: 92 ML/MIN/1.73
GLOBULIN UR ELPH-MCNC: 2.6 GM/DL
GLUCOSE BLD-MCNC: 237 MG/DL (ref 65–99)
GLUCOSE BLDC GLUCOMTR-MCNC: 186 MG/DL (ref 70–130)
HBA1C MFR BLD: 7.2 % (ref 4.8–5.6)
HCT VFR BLD AUTO: 41.2 % (ref 37.5–51)
HGB BLD-MCNC: 14.2 G/DL (ref 13–17.7)
IMM GRANULOCYTES # BLD AUTO: 0.11 10*3/MM3 (ref 0–0.05)
IMM GRANULOCYTES NFR BLD AUTO: 0.7 % (ref 0–0.5)
LYMPHOCYTES # BLD AUTO: 1.11 10*3/MM3 (ref 0.7–3.1)
LYMPHOCYTES NFR BLD AUTO: 7.1 % (ref 19.6–45.3)
MAGNESIUM SERPL-MCNC: 2 MG/DL (ref 1.6–2.4)
MCH RBC QN AUTO: 32.3 PG (ref 26.6–33)
MCHC RBC AUTO-ENTMCNC: 34.5 G/DL (ref 31.5–35.7)
MCV RBC AUTO: 93.8 FL (ref 79–97)
MONOCYTES # BLD AUTO: 0.87 10*3/MM3 (ref 0.1–0.9)
MONOCYTES NFR BLD AUTO: 5.6 % (ref 5–12)
NEUTROPHILS # BLD AUTO: 13.5 10*3/MM3 (ref 1.7–7)
NEUTROPHILS NFR BLD AUTO: 86.3 % (ref 42.7–76)
NRBC BLD AUTO-RTO: 0 /100 WBC (ref 0–0.2)
PLATELET # BLD AUTO: 245 10*3/MM3 (ref 140–450)
PMV BLD AUTO: 9.8 FL (ref 6–12)
POTASSIUM BLD-SCNC: 3.9 MMOL/L (ref 3.5–5.2)
PROT SERPL-MCNC: 6.6 G/DL (ref 6–8.5)
RBC # BLD AUTO: 4.39 10*6/MM3 (ref 4.14–5.8)
SODIUM BLD-SCNC: 141 MMOL/L (ref 136–145)
WBC NRBC COR # BLD: 15.63 10*3/MM3 (ref 3.4–10.8)

## 2019-10-01 PROCEDURE — 25010000002 HYDROMORPHONE PER 4 MG: Performed by: HOSPITALIST

## 2019-10-01 PROCEDURE — 99221 1ST HOSP IP/OBS SF/LOW 40: CPT | Performed by: SURGERY

## 2019-10-01 PROCEDURE — 99221 1ST HOSP IP/OBS SF/LOW 40: CPT | Performed by: INTERNAL MEDICINE

## 2019-10-01 PROCEDURE — 25010000002 PIPERACILLIN SOD-TAZOBACTAM PER 1 G

## 2019-10-01 PROCEDURE — 99231 SBSQ HOSP IP/OBS SF/LOW 25: CPT | Performed by: HOSPITALIST

## 2019-10-01 PROCEDURE — 85025 COMPLETE CBC W/AUTO DIFF WBC: CPT | Performed by: HOSPITALIST

## 2019-10-01 PROCEDURE — 25010000002 PIPERACILLIN SOD-TAZOBACTAM PER 1 G: Performed by: HOSPITALIST

## 2019-10-01 PROCEDURE — 80053 COMPREHEN METABOLIC PANEL: CPT | Performed by: HOSPITALIST

## 2019-10-01 PROCEDURE — 82962 GLUCOSE BLOOD TEST: CPT

## 2019-10-01 RX ORDER — NICOTINE POLACRILEX 4 MG
15 LOZENGE BUCCAL
Status: DISCONTINUED | OUTPATIENT
Start: 2019-10-01 | End: 2019-10-06 | Stop reason: HOSPADM

## 2019-10-01 RX ORDER — PIPERACILLIN SODIUM, TAZOBACTAM SODIUM 4; .5 G/20ML; G/20ML
INJECTION, POWDER, LYOPHILIZED, FOR SOLUTION INTRAVENOUS
Status: COMPLETED
Start: 2019-10-01 | End: 2019-10-01

## 2019-10-01 RX ORDER — SODIUM CHLORIDE 9 MG/ML
INJECTION, SOLUTION INTRAVENOUS
Status: COMPLETED
Start: 2019-10-01 | End: 2019-10-01

## 2019-10-01 RX ORDER — HYDROMORPHONE HCL 110MG/55ML
0.5 PATIENT CONTROLLED ANALGESIA SYRINGE INTRAVENOUS EVERY 4 HOURS PRN
Status: DISCONTINUED | OUTPATIENT
Start: 2019-10-01 | End: 2019-10-06 | Stop reason: HOSPADM

## 2019-10-01 RX ORDER — DEXTROSE MONOHYDRATE 25 G/50ML
25 INJECTION, SOLUTION INTRAVENOUS
Status: DISCONTINUED | OUTPATIENT
Start: 2019-10-01 | End: 2019-10-06 | Stop reason: HOSPADM

## 2019-10-01 RX ADMIN — HYDROMORPHONE HYDROCHLORIDE 0.5 MG: 2 INJECTION INTRAMUSCULAR; INTRAVENOUS; SUBCUTANEOUS at 12:40

## 2019-10-01 RX ADMIN — METRONIDAZOLE 500 MG: 500 INJECTION, SOLUTION INTRAVENOUS at 00:30

## 2019-10-01 RX ADMIN — SODIUM CHLORIDE: 900 INJECTION, SOLUTION INTRAVENOUS at 06:11

## 2019-10-01 RX ADMIN — SODIUM CHLORIDE 4.5 G: 900 INJECTION, SOLUTION INTRAVENOUS at 23:06

## 2019-10-01 RX ADMIN — SODIUM CHLORIDE 4.5 G: 900 INJECTION, SOLUTION INTRAVENOUS at 15:38

## 2019-10-01 RX ADMIN — PIPERACILLIN, TAZOBACTAM 4.5 G: 4; .5 INJECTION, POWDER, LYOPHILIZED, FOR SOLUTION INTRAVENOUS at 06:09

## 2019-10-01 RX ADMIN — HYDROMORPHONE HYDROCHLORIDE 0.5 MG: 2 INJECTION INTRAMUSCULAR; INTRAVENOUS; SUBCUTANEOUS at 06:06

## 2019-10-01 NOTE — H&P
"[facility] HOSPITALIST     Jessica Mac MD    CHIEF COMPLAINT: Abdominal Pain    HISTORY OF PRESENT ILLNESS:    Mr. Gutierrez is a pleasant, 78 y/o  male who presents this evening due to lower quadrant abdominal pain and diarrhea although he hasn't had diarrhea since arriving in the ER.  He is not a good historian and his wife has gone home for the evening.  The HPI is taken from review of the records.  His symptoms began a couple of days ago.  No reported sick contacts.  Initially, his wife felt as though the diarrhea was coming from his Metformin, and his pills were cut in half, and that seemed to helped.  When asked about his pain, he points to his lower abdomen and describes it as \"sore\".  It does not appear that his abdominal pain was relieved by bowel movements.  The diarrhea was non-bloody.  No reported nausea or vomiting.  No reported fever or chills.  He has continued to tolerate his pills.  Mr. Gutierrez reports his blood glucose runs \"high\".      Past Medical History:   Diagnosis Date   • Arthritis     BACK PAIN   • Dementia    • Diabetes (CMS/HCC)     TYPE 2   • Knee sprain    • ZENA (obstructive sleep apnea)     cpap     Past Surgical History:   Procedure Laterality Date   • COLONOSCOPY N/A 5/19/2017    Procedure: COLONOSCOPY;  Surgeon: Srikanth Payan MD;  Location: [facility] OR;  Service:    • CYSTOSCOPY TRANSURETHRAL RESECTION OF PROSTATE N/A 10/16/2017    Procedure: CYSTOSCOPY TRANSURETHRAL RESECTION OF PROSTATE;  Surgeon: Basil Yang MD;  Location: [facility] OR;  Service:    • HERNIA REPAIR Bilateral     inguinal   • VA TOTAL KNEE ARTHROPLASTY Right 6/20/2017    Procedure: TOTAL KNEE ARTHROPLASTY hima orthoalign antibiotic cement 7fr hemovac placement;  Surgeon: Bj Thrasher MD;  Location: [facility] OR;  Service: Orthopedics   • SHOULDER SURGERY Right 2007    RCR     History reviewed. No pertinent family history.  Social History     Tobacco Use   • Smoking status: " Never Smoker   • Smokeless tobacco: Never Used   Substance Use Topics   • Alcohol use: Yes     Comment: one jim/week   • Drug use: No     Medications Prior to Admission   Medication Sig Dispense Refill Last Dose   • cephalexin (KEFLEX) 500 MG capsule Take 500 mg by mouth 3 (Three) Times a Day.      • donepezil (ARICEPT) 10 MG tablet Take 10 mg by mouth Every Night.   10/15/2017 at Unknown time   • finasteride (PROSCAR) 5 MG tablet Take 5 mg by mouth Every Night.   More than a month at Unknown time   • ILEVRO 0.3 % suspension 1 DROP RIGHT EYE DAILY STARTING THE DAY OF SURGERY FOR 2 WEEKS THEN STOP  1    • memantine (NAMENDA XR) 28 MG capsule sustained-release 24 hr extended release capsule Take 28 mg by mouth Every Night.   10/15/2017 at Unknown time   • metFORMIN (GLUCOPHAGE) 500 MG tablet TAKE 2 TABLETS TWICE A  tablet 2 9/30/2019 at Unknown time   • moxifloxacin (VIGAMOX) 0.5 % ophthalmic solution PLEASE SEE ATTACHED FOR DETAILED DIRECTIONS  1    • prednisoLONE acetate (PRED FORTE) 1 % ophthalmic suspension PLEASE SEE ATTACHED FOR DETAILED DIRECTIONS  1    • sertraline (ZOLOFT) 50 MG tablet Take 50 mg by mouth Daily.      • aspirin 81 MG tablet Take 81 mg by mouth Daily.      • fexofenadine (ALLEGRA) 180 MG tablet Take 180 mg by mouth Daily.   Past Week at Unknown time   • Multiple Vitamin (MULTIVITAMIN) tablet Take 1 tablet by mouth Daily.   10/16/2017 at Unknown time   • RAPAFLO 8 MG capsule capsule 8 mg Daily With Breakfast.   10/16/2017 at Unknown time   • sertraline (ZOLOFT) 25 MG tablet Take 25 mg by mouth Every Morning.   10/15/2017 at Unknown time     Allergies:  Patient has no known allergies.    REVIEW OF SYSTEMS:  Please see the above history of present illness for pertinent positives and negatives.  The remainder of the patient's systems have been reviewed and are negative to the best of my ability given the patient's limited ability.    Vital Signs  Temp:  [98.2 °F (36.8 °C)] 98.2 °F  "(36.8 °C)  Heart Rate:  [78-82] 82  Resp:  [16-18] 18  BP: (138-146)/(69-77) 138/69  Oxygen Therapy  SpO2: 96 %  Pulse Oximetry Type: Continuous  Device (Oxygen Therapy): room air}  Body mass index is 29.15 kg/m².     Flowsheet Rows      First Filed Value   Admission Height  180.3 cm (71\") Documented at 09/30/2019 2030   Admission Weight  94.8 kg (209 lb) Documented at 09/30/2019 2030           Physical Exam:  Physical Exam   Constitutional: Patient appears well-developed and well-nourished and in no acute distress.  Appears stated age.   HEENT:   Head: Normocephalic and atraumatic.   Eyes:  Pupils are equal, round, and reactive to light. EOM are intact. Sclerae are anicteric and noninjected.  Mouth and Throat: Patient has moist mucous membranes. Oropharynx is clear of any erythema or exudate.    Neck: Neck supple. No JVD present. No thyromegaly present. No lymphadenopathy present.  Cardiovascular: Regular rate, regular rhythm, S1 normal and S2 normal.  Exam reveals no gallop and no friction rub.  No murmur heard.  Pulmonary/Chest: Lungs are clear to auscultation bilaterally. No respiratory distress. No wheezes. No rhonchi. No rales.   Abdominal: Soft. No peritoneal signs.  Pain is localized the LLQ.  No rebound.  Bowel sounds are diminished.  Rectus stasis deformity is noted.  Musculoskeletal: Normal muscle tone.  Motor exam is normal at the BUE and BLE.  Extremities: No edema. Pulses are palpable in all 4 extremities.  Neurological: Cranial nerves II-XII are grossly intact with no focal deficits.  Skin: Skin is warm. No rash noted. No cyanosis or erythema.  Psych: Pleasant affect.  AAOx2.    Emotional Behavior:    Judgement and Insight: Unknown   Mental Status:  Alertness  Normal   Memory:  Unknown   Mood and Affect:         Depression  None               Anxiety  None    Debilities:   Physical Weakness  None   Handicaps  None   Disabilities  None   Agitation  None     Results Review:    I reviewed the patient's " new clinical results.  Lab Results (most recent)     Procedure Component Value Units Date/Time    Urinalysis, Microscopic Only - Urine, Clean Catch [[id number]]  (Abnormal) Collected:  09/30/19 2222    Specimen:  Urine, Clean Catch Updated:  09/30/19 2237     RBC, UA 0-2 /HPF      WBC, UA 0-2 /HPF      Bacteria, UA Trace /HPF      Squamous Epithelial Cells, UA 0-2 /HPF      Hyaline Casts, UA None Seen /LPF      Methodology Manual Light Microscopy    Urinalysis With Culture If Indicated - Urine, Clean Catch [[id number]]  (Abnormal) Collected:  09/30/19 2222    Specimen:  Urine, Clean Catch Updated:  09/30/19 2236     Color, UA Yellow     Appearance, UA Clear     pH, UA 6.5     Specific Gravity, UA 1.015     Glucose,  mg/dL (2+)     Ketones, UA Trace     Bilirubin, UA Negative     Blood, UA Trace     Protein, UA Negative     Leuk Esterase, UA Negative     Nitrite, UA Negative     Urobilinogen, UA 0.2 E.U./dL    Comprehensive Metabolic Panel [[id number]]  (Abnormal) Collected:  09/30/19 2106    Specimen:  Blood Updated:  09/30/19 2131     Glucose 291 mg/dL      BUN 17 mg/dL      Creatinine 0.97 mg/dL      Sodium 140 mmol/L      Potassium 4.0 mmol/L      Chloride 99 mmol/L      CO2 29.3 mmol/L      Calcium 9.7 mg/dL      Total Protein 7.3 g/dL      Albumin 4.50 g/dL      ALT (SGPT) 12 U/L      AST (SGOT) 14 U/L      Alkaline Phosphatase 97 U/L      Total Bilirubin 0.4 mg/dL      eGFR Non African Amer 75 mL/min/1.73      Globulin 2.8 gm/dL      A/G Ratio 1.6 g/dL      BUN/Creatinine Ratio 17.5     Anion Gap 11.7 mmol/L     Narrative:       GFR Normal >60  Chronic Kidney Disease <60  Kidney Failure <15    CBC & Differential [[id number]] Collected:  09/30/19 2106    Specimen:  Blood Updated:  09/30/19 2116    Narrative:       The following orders were created for panel order CBC & Differential.  Procedure                               Abnormality         Status                     ---------                                -----------         ------                     CBC Auto Differential[[id number]]        Abnormal            Final result                 Please view results for these tests on the individual orders.    CBC Auto Differential [[id number]]  (Abnormal) Collected:  09/30/19 2106    Specimen:  Blood Updated:  09/30/19 2116     WBC 17.61 10*3/mm3      RBC 4.86 10*6/mm3      Hemoglobin 15.7 g/dL      Hematocrit 45.4 %      MCV 93.4 fL      MCH 32.3 pg      MCHC 34.6 g/dL      RDW 12.2 %      RDW-SD 42.1 fl      MPV 9.1 fL      Platelets 266 10*3/mm3      Neutrophil % 90.5 %      Lymphocyte % 4.0 %      Monocyte % 4.4 %      Eosinophil % 0.2 %      Basophil % 0.4 %      Immature Grans % 0.5 %      Neutrophils, Absolute 15.93 10*3/mm3      Lymphocytes, Absolute 0.71 10*3/mm3      Monocytes, Absolute 0.78 10*3/mm3      Eosinophils, Absolute 0.03 10*3/mm3      Basophils, Absolute 0.07 10*3/mm3      Immature Grans, Absolute 0.09 10*3/mm3      nRBC 0.0 /100 WBC           Imaging Results (most recent)     Procedure Component Value Units Date/Time    CT Abdomen Pelvis Without Contrast [[id number]] Collected:  09/30/19 2209     Updated:  09/30/19 2212    Narrative:       CT Abdomen Pelvis WO    INDICATION:   Bilateral flank pain since yesterday.    TECHNIQUE:   CT of the abdomen and pelvis without IV contrast. Coronal and sagittal reconstructions were obtained.  Radiation dose reduction techniques included automated exposure control or exposure modulation based on body size. Count of known CT and cardiac nuc  med studies performed in previous 12 months: 0.     COMPARISON:   7/21/2008    FINDINGS:  Abdomen: Lung bases are clear. There is a moderate-sized hiatal hernia. The liver, gallbladder, spleen, pancreas, adrenal glands and left kidney normal. The right kidney has a 4 cm cyst is otherwise normal. Aorta is normal in size. There is no  adenopathy. There are numerous sigmoid diverticuli and there are inflammatory changes around the  mid sigmoid colon. There may be one bubble of free air but this could also be a thin-walled diverticulum. There is no abscess visible. Rest the bowel is  normal.    Pelvis: Bladder and prostate gland are normal. Bones are unremarkable.      Impression:       Findings are consistent with diverticulitis. There are inflammatory changes around the mid sigmoid colon. There is a 1 cm bubble of air associated with the inflammatory change. Its difficult to tell if this represents air in a thin walled diverticulum or  small collection of extraluminal air. There is no abscess formation. There is no free air identified elsewhere in the abdomen or pelvis.    Otherwise study is negative          Signer Name: Celso Mayberry MD   Signed: 9/30/2019 10:09 PM   Workstation Name: [facility]-    [facility]        reviewed    ECG/EMG Results (most recent)     None          Assessment/Plan     1.  Acute Sigmoid Diverticulitis: I've changed him to IV Zosyn.  Dr. Lovelace was contacted from the ER and will see in the morning.  Will ask Dr. Payan to see as well as he performed his prior c-scope.    2.  Alzheimer's Dementia: Reviewed Dr. Mac's last clinic note and his dementia continues to worsen.  He is appropriate on my exam and likely is at his baseline.  Holding oral therapy.    3.  Diabetes Mellitus, Type 2: A1c pending.  CT was non-contrasted so Metformin can be resumed when appropriate.  For now, continue NPO w/ SSI.    4.  GERD: Will place on IV Pepcid.     I discussed the patients findings and my recommendations with patient and wife via nurse.     Leonel Rahman MD  09/30/19  11:51 PM

## 2019-10-01 NOTE — ED PROVIDER NOTES
Subjective   History of Present Illness  History of Present Illness    Chief complaint: Abdominal pain    Location: Diffuse lower abdomen    Quality/Severity: Sharp.  Varies.  Currently moderate    Timing/Duration: 2 days.  Intermittent.  Worsening.    Modifying Factors: Nothing specific makes worse or better.    Associated Symptoms: Denies nausea or vomiting.  A couple of episodes of loose stools after starting metformin, but has resolved.  Denies fevers or chills.  Denies dysuria, hematuria, frequency or urgency.  Denies back pain.  Denies chest pain or shortness of breath.    Narrative: 79-year-old male presents with abdominal pain as above.  He denies any trauma.  He denies any sick contacts.    Review of Systems   Constitutional: Negative.  Negative for fever.   HENT: Negative.    Respiratory: Negative.  Negative for shortness of breath.    Cardiovascular: Negative.  Negative for chest pain.   Gastrointestinal: Positive for abdominal pain. Negative for blood in stool, diarrhea, nausea and vomiting.   Genitourinary: Negative.  Negative for dysuria, frequency, hematuria, testicular pain and urgency.   Musculoskeletal: Negative.  Negative for back pain.   Skin: Negative.    Neurological: Negative.  Negative for dizziness and headaches.   All other systems reviewed and are negative.      Past Medical History:   Diagnosis Date   • Arthritis     BACK PAIN   • Dementia    • Diabetes (CMS/HCC)     TYPE 2   • Knee sprain    • ZENA (obstructive sleep apnea)     cpap       No Known Allergies    Past Surgical History:   Procedure Laterality Date   • COLONOSCOPY N/A 5/19/2017    Procedure: COLONOSCOPY;  Surgeon: Srikanth Payan MD;  Location: [facility] OR;  Service:    • CYSTOSCOPY TRANSURETHRAL RESECTION OF PROSTATE N/A 10/16/2017    Procedure: CYSTOSCOPY TRANSURETHRAL RESECTION OF PROSTATE;  Surgeon: Basil Yang MD;  Location: [facility] OR;  Service:    • HERNIA REPAIR Bilateral     inguinal   • GA TOTAL KNEE  ARTHROPLASTY Right 6/20/2017    Procedure: TOTAL KNEE ARTHROPLASTY hima orthoalign antibiotic cement 7fr hemovac placement;  Surgeon: Bj Thrasher MD;  Location: [facility];  Service: Orthopedics   • SHOULDER SURGERY Right 2007    RCR       History reviewed. No pertinent family history.    Social History     Socioeconomic History   • Marital status:      Spouse name: Not on file   • Number of children: Not on file   • Years of education: Not on file   • Highest education level: Not on file   Tobacco Use   • Smoking status: Never Smoker   • Smokeless tobacco: Never Used   Substance and Sexual Activity   • Alcohol use: Yes     Comment: one jim/week   • Drug use: No   • Sexual activity: Defer       Current Facility-Administered Medications:   •  Insert peripheral IV, , , Once **AND** sodium chloride 0.9 % flush 10 mL, 10 mL, Intravenous, PRN, Anisa Bueno, AUGUSTUS    Current Outpatient Medications:   •  metFORMIN (GLUCOPHAGE) 500 MG tablet, TAKE 2 TABLETS TWICE A DAY, Disp: 360 tablet, Rfl: 2  •  aspirin 81 MG tablet, Take 81 mg by mouth Daily., Disp: , Rfl:   •  donepezil (ARICEPT) 10 MG tablet, Take 10 mg by mouth Every Night., Disp: , Rfl:   •  fexofenadine (ALLEGRA) 180 MG tablet, Take 180 mg by mouth Daily., Disp: , Rfl:   •  finasteride (PROSCAR) 5 MG tablet, Take 5 mg by mouth Every Night., Disp: , Rfl:   •  ILEVRO 0.3 % suspension, 1 DROP RIGHT EYE DAILY STARTING THE DAY OF SURGERY FOR 2 WEEKS THEN STOP, Disp: , Rfl: 1  •  memantine (NAMENDA XR) 28 MG capsule sustained-release 24 hr extended release capsule, Take 28 mg by mouth Every Night., Disp: , Rfl:   •  moxifloxacin (VIGAMOX) 0.5 % ophthalmic solution, PLEASE SEE ATTACHED FOR DETAILED DIRECTIONS, Disp: , Rfl: 1  •  Multiple Vitamin (MULTIVITAMIN) tablet, Take 1 tablet by mouth Daily., Disp: , Rfl:   •  prednisoLONE acetate (PRED FORTE) 1 % ophthalmic suspension, PLEASE SEE ATTACHED FOR DETAILED DIRECTIONS, Disp: , Rfl: 1  •  RAPAFLO 8 MG  capsule capsule, 8 mg Daily With Breakfast., Disp: , Rfl:   •  sertraline (ZOLOFT) 25 MG tablet, Take 25 mg by mouth Every Morning., Disp: , Rfl:         Objective   Physical Exam  Vitals:    09/30/19 2030   BP: 146/77   Pulse: 78   Resp: 16   Temp: 98.2 °F (36.8 °C)   SpO2: 97%     GENERAL: a/o x 4, NAD, appears well, smiling and joking.  SKIN: Warm pink and dry   HEENT:  PERRLA, EOM intact, conjunctiva normal, sclera clear  NECK: supple, no JVD  LUNGS: Clear to auscultation bilaterally without wheezes, rales or rhonchi.  No accessory muscle use and no nasal flaring.  CARDIAC:  Regular rate and rhythm, S1-S2.  No murmurs, rubs or gallops.  No peripheral edema.  Equal pulses bilaterally.  ABDOMEN: Soft, diffusely tender lower abdomen: Suprapubic, right lower quadrant and left lower quadrant., nondistended.  No guarding or rebound tenderness.  Normal bowel sounds.  No CVA tenderness.  MUSCULOSKELETAL: Moves all extremities well.  No deformity.  NEURO: Cranial nerves II through XII grossly intact.  No gross focal deficits.  Alert.  Normal speech and motor.  PSYCH: Normal mood and affect      Procedures           ED Course    pt declines pain meds.    Results for orders placed or performed during the hospital encounter of 09/30/19   Comprehensive Metabolic Panel   Result Value Ref Range    Glucose 291 (H) 65 - 99 mg/dL    BUN 17 8 - 23 mg/dL    Creatinine 0.97 0.76 - 1.27 mg/dL    Sodium 140 136 - 145 mmol/L    Potassium 4.0 3.5 - 5.2 mmol/L    Chloride 99 98 - 107 mmol/L    CO2 29.3 (H) 22.0 - 29.0 mmol/L    Calcium 9.7 8.6 - 10.5 mg/dL    Total Protein 7.3 6.0 - 8.5 g/dL    Albumin 4.50 3.50 - 5.20 g/dL    ALT (SGPT) 12 1 - 41 U/L    AST (SGOT) 14 1 - 40 U/L    Alkaline Phosphatase 97 39 - 117 U/L    Total Bilirubin 0.4 0.2 - 1.2 mg/dL    eGFR Non African Amer 75 >60 mL/min/1.73    Globulin 2.8 gm/dL    A/G Ratio 1.6 g/dL    BUN/Creatinine Ratio 17.5 7.0 - 25.0    Anion Gap 11.7 5.0 - 15.0 mmol/L   Urinalysis With  Culture If Indicated - Urine, Clean Catch   Result Value Ref Range    Color, UA Yellow Yellow, Straw    Appearance, UA Clear Clear    pH, UA 6.5 4.5 - 8.0    Specific Gravity, UA 1.015 1.003 - 1.030    Glucose,  mg/dL (2+) (A) Negative    Ketones, UA Trace (A) Negative    Bilirubin, UA Negative Negative    Blood, UA Trace (A) Negative    Protein, UA Negative Negative    Leuk Esterase, UA Negative Negative    Nitrite, UA Negative Negative    Urobilinogen, UA 0.2 E.U./dL 0.2 - 1.0 E.U./dL   CBC Auto Differential   Result Value Ref Range    WBC 17.61 (H) 3.40 - 10.80 10*3/mm3    RBC 4.86 4.14 - 5.80 10*6/mm3    Hemoglobin 15.7 13.0 - 17.7 g/dL    Hematocrit 45.4 37.5 - 51.0 %    MCV 93.4 79.0 - 97.0 fL    MCH 32.3 26.6 - 33.0 pg    MCHC 34.6 31.5 - 35.7 g/dL    RDW 12.2 (L) 12.3 - 15.4 %    RDW-SD 42.1 37.0 - 54.0 fl    MPV 9.1 6.0 - 12.0 fL    Platelets 266 140 - 450 10*3/mm3    Neutrophil % 90.5 (H) 42.7 - 76.0 %    Lymphocyte % 4.0 (L) 19.6 - 45.3 %    Monocyte % 4.4 (L) 5.0 - 12.0 %    Eosinophil % 0.2 (L) 0.3 - 6.2 %    Basophil % 0.4 0.0 - 1.5 %    Immature Grans % 0.5 0.0 - 0.5 %    Neutrophils, Absolute 15.93 (H) 1.70 - 7.00 10*3/mm3    Lymphocytes, Absolute 0.71 0.70 - 3.10 10*3/mm3    Monocytes, Absolute 0.78 0.10 - 0.90 10*3/mm3    Eosinophils, Absolute 0.03 0.00 - 0.40 10*3/mm3    Basophils, Absolute 0.07 0.00 - 0.20 10*3/mm3    Immature Grans, Absolute 0.09 (H) 0.00 - 0.05 10*3/mm3    nRBC 0.0 0.0 - 0.2 /100 WBC   Urinalysis, Microscopic Only - Urine, Clean Catch   Result Value Ref Range    RBC, UA 0-2 (A) None Seen /HPF    WBC, UA 0-2 (A) None Seen /HPF    Bacteria, UA Trace (A) None Seen /HPF    Squamous Epithelial Cells, UA 0-2 None Seen, 0-2 /HPF    Hyaline Casts, UA None Seen None Seen /LPF    Methodology Manual Light Microscopy      Reviewed CT a/p. Independently viewed by me. Interpreted by radiologist. Discussed with pt and wife.  Ct Abdomen Pelvis Without Contrast    Result Date:  9/30/2019  Narrative: CT Abdomen Pelvis WO INDICATION: Bilateral flank pain since yesterday. TECHNIQUE: CT of the abdomen and pelvis without IV contrast. Coronal and sagittal reconstructions were obtained.  Radiation dose reduction techniques included automated exposure control or exposure modulation based on body size. Count of known CT and cardiac nuc med studies performed in previous 12 months: 0. COMPARISON: 7/21/2008 FINDINGS: Abdomen: Lung bases are clear. There is a moderate-sized hiatal hernia. The liver, gallbladder, spleen, pancreas, adrenal glands and left kidney normal. The right kidney has a 4 cm cyst is otherwise normal. Aorta is normal in size. There is no adenopathy. There are numerous sigmoid diverticuli and there are inflammatory changes around the mid sigmoid colon. There may be one bubble of free air but this could also be a thin-walled diverticulum. There is no abscess visible. Rest the bowel is normal. Pelvis: Bladder and prostate gland are normal. Bones are unremarkable.     Impression: Findings are consistent with diverticulitis. There are inflammatory changes around the mid sigmoid colon. There is a 1 cm bubble of air associated with the inflammatory change. Its difficult to tell if this represents air in a thin walled diverticulum or small collection of extraluminal air. There is no abscess formation. There is no free air identified elsewhere in the abdomen or pelvis. Otherwise study is negative Signer Name: Celso Mayberry MD  Signed: 9/30/2019 10:09 PM  Workstation Name: [email or web address]-  [facility]/ Marie given.    CONSULT  Time 2230  Discussed case with Dr Lovelace  Reviewed history, exam, results and treatments.  Discussed concerns and plan of care. Admit to hospitalist, treat conservative. He will see in am. NPO and abx.  Pt and wife agree.   2247- d/w Dr. Rahman, he accepts.             MDM  Number of Diagnoses or Management Options  Acute diverticulitis: new  and requires workup     Amount and/or Complexity of Data Reviewed  Clinical lab tests: reviewed and ordered  Tests in the radiology section of CPT®: ordered and reviewed  Tests in the medicine section of CPT®: reviewed and ordered  Discuss the patient with other providers: yes  Independent visualization of images, tracings, or specimens: yes    Risk of Complications, Morbidity, and/or Mortality  Presenting problems: moderate  Diagnostic procedures: moderate  Management options: moderate    Patient Progress  Patient progress: improved    My differential diagnosis for abdominal pain includes but is not limited to:  Gastritis, gastroenteritis, peptic ulcer disease, GERD, esophageal perforation, acute appendicitis, mesenteric adenitis, Meckel’s diverticulum, epiploic appendagitis, diverticulitis, colon cancer, ulcerative colitis, Crohn’s disease, intussusception, small bowel obstruction, adhesions, ischemic bowel, perforated viscus, ileus, obstipation, biliary colic, cholecystitis, cholelithiasis, Nate-Tashi Cesar, hepatitis, pancreatitis, common bile duct obstruction, cholangitis, bile leak, splenic trauma, splenic rupture, splenic infarction, splenic abscess, abdominal abscess, ascites, spontaneous bacterial peritonitis, hernia, UTI, cystitis, prostatitis,ureterolithiasis, urinary obstruction, AAA, myocardial infarction, pneumonia, cancer, porphyria, DKA, medications, sickle cell, viral syndrome, zoster    Final diagnoses:   Acute diverticulitis     Dictated utilizing Dragon dictation           Anisa Bueno PA-C  09/30/19 7108

## 2019-10-01 NOTE — CONSULTS
Patient Care Team:  Jessica Mac MD as PCP - General  Jessica Mac MD as PCP - Family Medicine  Jessica Mac MD as PCP - Claims Attributed    CHIEF COMPLAINT: Diverticulitis    HISTORY OF PRESENT ILLNESS:    79 mod demented WM with sudden onset of abd pian yesterday ER eval shows Divcerticulitis and possible extra luminal air. Per my review I agree with focsal sigmoid inflammation and possibl micro perf but no fluid anf could be a large inflamed Diverticulum. He also has a sig hiatal henia.    but comfortable lying in bed and reviewed indicatin for NPO. Wife is present but will sleep at home.  Reviewed last colonoscopy oin 5/2017 and was normal except for moderate diverticulosis and a single incidental angiodysplasia. His WBC 17K-15K his BS are elevated but he had a diarrhea prodrome and his wife had backed off his Metformin for that reason.      Past Medical History:   Diagnosis Date   • Arthritis     BACK PAIN   • Dementia    • Diabetes (CMS/HCC)     TYPE 2   • Knee sprain    • ZENA (obstructive sleep apnea)     cpap     Past Surgical History:   Procedure Laterality Date   • COLONOSCOPY N/A 5/19/2017    Procedure: COLONOSCOPY;  Surgeon: Sirkanth Payan MD;  Location: [facility] OR;  Service:    • CYSTOSCOPY TRANSURETHRAL RESECTION OF PROSTATE N/A 10/16/2017    Procedure: CYSTOSCOPY TRANSURETHRAL RESECTION OF PROSTATE;  Surgeon: Basil Yang MD;  Location: [facility] OR;  Service:    • HERNIA REPAIR Bilateral     inguinal   • WI TOTAL KNEE ARTHROPLASTY Right 6/20/2017    Procedure: TOTAL KNEE ARTHROPLASTY hima orthoalign antibiotic cement 7fr hemovac placement;  Surgeon: Bj Thrasher MD;  Location: [facility] OR;  Service: Orthopedics   • SHOULDER SURGERY Right 2007    RCR     History reviewed. No pertinent family history.  Social History     Tobacco Use   • Smoking status: Never Smoker   • Smokeless tobacco: Never Used   Substance Use Topics   • Alcohol use: Yes     Comment: one  jim/week   • Drug use: No     Medications Prior to Admission   Medication Sig Dispense Refill Last Dose   • cephalexin (KEFLEX) 500 MG capsule Take 500 mg by mouth 3 (Three) Times a Day.      • donepezil (ARICEPT) 10 MG tablet Take 10 mg by mouth Every Night.   10/15/2017 at Unknown time   • finasteride (PROSCAR) 5 MG tablet Take 5 mg by mouth Every Night.   More than a month at Unknown time   • ILEVRO 0.3 % suspension 1 DROP RIGHT EYE DAILY STARTING THE DAY OF SURGERY FOR 2 WEEKS THEN STOP  1    • memantine (NAMENDA XR) 28 MG capsule sustained-release 24 hr extended release capsule Take 28 mg by mouth Every Night.   10/15/2017 at Unknown time   • metFORMIN (GLUCOPHAGE) 500 MG tablet TAKE 2 TABLETS TWICE A  tablet 2 9/30/2019 at Unknown time   • moxifloxacin (VIGAMOX) 0.5 % ophthalmic solution PLEASE SEE ATTACHED FOR DETAILED DIRECTIONS  1    • prednisoLONE acetate (PRED FORTE) 1 % ophthalmic suspension PLEASE SEE ATTACHED FOR DETAILED DIRECTIONS  1    • sertraline (ZOLOFT) 50 MG tablet Take 50 mg by mouth Daily.      • aspirin 81 MG tablet Take 81 mg by mouth Daily.      • fexofenadine (ALLEGRA) 180 MG tablet Take 180 mg by mouth Daily.   Past Week at Unknown time   • Multiple Vitamin (MULTIVITAMIN) tablet Take 1 tablet by mouth Daily.   10/16/2017 at Unknown time   • RAPAFLO 8 MG capsule capsule 8 mg Daily With Breakfast.   10/16/2017 at Unknown time   • sertraline (ZOLOFT) 25 MG tablet Take 25 mg by mouth Every Morning.   10/15/2017 at Unknown time     Allergies:  Patient has no known allergies.    REVIEW OF SYSTEMS:  Please see the above history of present illness for pertinent positives and negatives.  The remainder of the patient's systems have been reviewed and are negative.     Vital Signs  Temp:  [98.2 °F (36.8 °C)-100 °F (37.8 °C)] 99.4 °F (37.4 °C)  Heart Rate:  [70-90] 70  Resp:  [16-18] 16  BP: (107-146)/(62-77) 111/70    Flowsheet Rows      First Filed Value   Admission Height  180.3 cm  "(71\") Documented at 09/30/2019 2030   Admission Weight  94.8 kg (209 lb) Documented at 09/30/2019 2030           Physical Exam:  Physical Exam   Constitutional: Patient appears well-developed and well-nourished and in no acute distress   HEENT:   Head: Normocephalic and atraumatic.   Eyes:  Pupils are equal, round, and reactive to light. EOM are intact. Sclerae are anicteric and non-injected.  Mouth and Throat: Patient has moist mucous membranes. Oropharynx is clear of any erythema or exudate.     Neck: Neck supple. No JVD present. No thyromegaly present. No lymphadenopathy present.  Cardiovascular: Regular rate, regular rhythm, S1 normal and S2 normal.  Exam reveals no gallop and no friction rub.  No murmur heard.  Pulmonary/Chest: Lungs are clear to auscultation bilaterally. No respiratory distress. No wheezes. No rhonchi. No rales.   Abdominal: Soft. Bowel sounds are normal. No distension and no mass. There is no hepatosplenomegaly. There is mild/ mod LLQ ,suprapubic/RLQ tenderness.   Musculoskeletal: Normal Muscle tone  Extremities: No edema. Pulses are palpable in all 4 extremities.  Neurological: Patient is alert and oriented to person, place, and time. Cranial nerves II-XII are grossly intact with no focal deficits.  Skin: Skin is warm. No rash noted. Nails show no clubbing.  No cyanosis or erythema.    Debilities/Disabilities Identified: None  Emotional Behavior: Appropriate     Results Review:    I reviewed the patient's new clinical results.  Lab Results (most recent)     Procedure Component Value Units Date/Time    POC Glucose Once [[id number]]  (Abnormal) Collected:  10/01/19 1802    Specimen:  Blood Updated:  10/01/19 1809     Glucose 186 mg/dL     Comprehensive Metabolic Panel [[id number]]  (Abnormal) Collected:  10/01/19 0520    Specimen:  Blood Updated:  10/01/19 0644     Glucose 237 mg/dL      BUN 17 mg/dL      Creatinine 0.81 mg/dL      Sodium 141 mmol/L      Potassium 3.9 mmol/L      Chloride 103 " mmol/L      CO2 23.9 mmol/L      Calcium 9.1 mg/dL      Total Protein 6.6 g/dL      Albumin 4.00 g/dL      ALT (SGPT) 10 U/L      AST (SGOT) 13 U/L      Alkaline Phosphatase 79 U/L      Total Bilirubin 0.6 mg/dL      eGFR Non African Amer 92 mL/min/1.73      Globulin 2.6 gm/dL      A/G Ratio 1.5 g/dL      BUN/Creatinine Ratio 21.0     Anion Gap 14.1 mmol/L     Narrative:       GFR Normal >60  Chronic Kidney Disease <60  Kidney Failure <15    CBC & Differential [[id number]] Collected:  10/01/19 0520    Specimen:  Blood Updated:  10/01/19 0625    Narrative:       The following orders were created for panel order CBC & Differential.  Procedure                               Abnormality         Status                     ---------                               -----------         ------                     CBC Auto Differential[[id number]]        Abnormal            Final result                 Please view results for these tests on the individual orders.    CBC Auto Differential [[id number]]  (Abnormal) Collected:  10/01/19 0520    Specimen:  Blood Updated:  10/01/19 0625     WBC 15.63 10*3/mm3      RBC 4.39 10*6/mm3      Hemoglobin 14.2 g/dL      Hematocrit 41.2 %      MCV 93.8 fL      MCH 32.3 pg      MCHC 34.5 g/dL      RDW 12.2 %      RDW-SD 42.2 fl      MPV 9.8 fL      Platelets 245 10*3/mm3      Neutrophil % 86.3 %      Lymphocyte % 7.1 %      Monocyte % 5.6 %      Eosinophil % 0.0 %      Basophil % 0.3 %      Immature Grans % 0.7 %      Neutrophils, Absolute 13.50 10*3/mm3      Lymphocytes, Absolute 1.11 10*3/mm3      Monocytes, Absolute 0.87 10*3/mm3      Eosinophils, Absolute 0.00 10*3/mm3      Basophils, Absolute 0.04 10*3/mm3      Immature Grans, Absolute 0.11 10*3/mm3      nRBC 0.0 /100 WBC     Hemoglobin A1c [[id number]]  (Abnormal) Collected:  09/30/19 2106    Specimen:  Blood Updated:  10/01/19 0406     Hemoglobin A1C 7.20 %     Narrative:       Hemoglobin A1C Ranges:    Increased Risk for Diabetes  5.7% to  6.4%  Diabetes                     >= 6.5%  Diabetic Goal                < 7.0%    Magnesium [[id number]]  (Normal) Collected:  09/30/19 2106    Specimen:  Blood Updated:  10/01/19 0032     Magnesium 2.0 mg/dL     Urinalysis, Microscopic Only - Urine, Clean Catch [[id number]]  (Abnormal) Collected:  09/30/19 2222    Specimen:  Urine, Clean Catch Updated:  09/30/19 2237     RBC, UA 0-2 /HPF      WBC, UA 0-2 /HPF      Bacteria, UA Trace /HPF      Squamous Epithelial Cells, UA 0-2 /HPF      Hyaline Casts, UA None Seen /LPF      Methodology Manual Light Microscopy    Urinalysis With Culture If Indicated - Urine, Clean Catch [[id number]]  (Abnormal) Collected:  09/30/19 2222    Specimen:  Urine, Clean Catch Updated:  09/30/19 2236     Color, UA Yellow     Appearance, UA Clear     pH, UA 6.5     Specific Gravity, UA 1.015     Glucose,  mg/dL (2+)     Ketones, UA Trace     Bilirubin, UA Negative     Blood, UA Trace     Protein, UA Negative     Leuk Esterase, UA Negative     Nitrite, UA Negative     Urobilinogen, UA 0.2 E.U./dL    Comprehensive Metabolic Panel [[id number]]  (Abnormal) Collected:  09/30/19 2106    Specimen:  Blood Updated:  09/30/19 2131     Glucose 291 mg/dL      BUN 17 mg/dL      Creatinine 0.97 mg/dL      Sodium 140 mmol/L      Potassium 4.0 mmol/L      Chloride 99 mmol/L      CO2 29.3 mmol/L      Calcium 9.7 mg/dL      Total Protein 7.3 g/dL      Albumin 4.50 g/dL      ALT (SGPT) 12 U/L      AST (SGOT) 14 U/L      Alkaline Phosphatase 97 U/L      Total Bilirubin 0.4 mg/dL      eGFR Non African Amer 75 mL/min/1.73      Globulin 2.8 gm/dL      A/G Ratio 1.6 g/dL      BUN/Creatinine Ratio 17.5     Anion Gap 11.7 mmol/L     Narrative:       GFR Normal >60  Chronic Kidney Disease <60  Kidney Failure <15    CBC & Differential [[id number]] Collected:  09/30/19 2106    Specimen:  Blood Updated:  09/30/19 2116    Narrative:       The following orders were created for panel order CBC & Differential.  Procedure                                Abnormality         Status                     ---------                               -----------         ------                     CBC Auto Differential[[id number]]        Abnormal            Final result                 Please view results for these tests on the individual orders.    CBC Auto Differential [[id number]]  (Abnormal) Collected:  09/30/19 2106    Specimen:  Blood Updated:  09/30/19 2116     WBC 17.61 10*3/mm3      RBC 4.86 10*6/mm3      Hemoglobin 15.7 g/dL      Hematocrit 45.4 %      MCV 93.4 fL      MCH 32.3 pg      MCHC 34.6 g/dL      RDW 12.2 %      RDW-SD 42.1 fl      MPV 9.1 fL      Platelets 266 10*3/mm3      Neutrophil % 90.5 %      Lymphocyte % 4.0 %      Monocyte % 4.4 %      Eosinophil % 0.2 %      Basophil % 0.4 %      Immature Grans % 0.5 %      Neutrophils, Absolute 15.93 10*3/mm3      Lymphocytes, Absolute 0.71 10*3/mm3      Monocytes, Absolute 0.78 10*3/mm3      Eosinophils, Absolute 0.03 10*3/mm3      Basophils, Absolute 0.07 10*3/mm3      Immature Grans, Absolute 0.09 10*3/mm3      nRBC 0.0 /100 WBC           Imaging Results (most recent)     Procedure Component Value Units Date/Time    CT Abdomen Pelvis Without Contrast [[id number]] Collected:  09/30/19 2209     Updated:  09/30/19 2212    Narrative:       CT Abdomen Pelvis WO    INDICATION:   Bilateral flank pain since yesterday.    TECHNIQUE:   CT of the abdomen and pelvis without IV contrast. Coronal and sagittal reconstructions were obtained.  Radiation dose reduction techniques included automated exposure control or exposure modulation based on body size. Count of known CT and cardiac nuc  med studies performed in previous 12 months: 0.     COMPARISON:   7/21/2008    FINDINGS:  Abdomen: Lung bases are clear. There is a moderate-sized hiatal hernia. The liver, gallbladder, spleen, pancreas, adrenal glands and left kidney normal. The right kidney has a 4 cm cyst is otherwise normal. Aorta is normal in  size. There is no  adenopathy. There are numerous sigmoid diverticuli and there are inflammatory changes around the mid sigmoid colon. There may be one bubble of free air but this could also be a thin-walled diverticulum. There is no abscess visible. Rest the bowel is  normal.    Pelvis: Bladder and prostate gland are normal. Bones are unremarkable.      Impression:       Findings are consistent with diverticulitis. There are inflammatory changes around the mid sigmoid colon. There is a 1 cm bubble of air associated with the inflammatory change. Its difficult to tell if this represents air in a thin walled diverticulum or  small collection of extraluminal air. There is no abscess formation. There is no free air identified elsewhere in the abdomen or pelvis.    Otherwise study is negative          Signer Name: Celso Mayberry MD   Signed: 9/30/2019 10:09 PM   Workstation Name: [id number]    [facility]        reviewed    ECG/EMG Results (most recent)     None        reviewed    Assessment/Plan     Diverticulitis-?able Microperforation  Leukocytosis  Dementia  DM II  Hiatal hernia    Agree with present care and will follow along no need for Colonoscopy in the acute situation and as to whether a repeat of his 2017 exam is indicated will be based on his recovery    I discussed the patients findings and my recommendations with patient and Wife.     Srikanth Payan MD  10/01/19  7:03 PM    Time: Not Recorded

## 2019-10-01 NOTE — ED NOTES
Pt presents with c/o low mid abd pain for 2 days with diarrhea for the last several days. Family thought the diarrhea was from his metformin so she started cutting his pills in half and they thought that helped. Pt has not had any diarrhea today but says he thought he needed to have some and went to the bathroom several times d/t cramping in his low abd. Denies any urinary complaints. Pt is ambulatory with a steady gait, A&ox4. Denies any n/v.      Daina Carmona, RN  09/30/19 4286

## 2019-10-01 NOTE — CONSULTS
"I was asked to see this patient by the emergency room provider and the hospitalist group.  Chief complaint abdominal pain for 24 hours  History of present illness this 79-year-old white male complains of some lower abdominal pain starting the night before last.  He came to the emergency room last evening was worked up and was admitted.  He does have a prior history of diver reticulitis that was treated on an outpatient basis.  He had a colonoscopy 2 years ago by Dr. Payan.  Apparently he had some diarrhea prior to admission but none since  He has dementia and his wife provided his history  Past medical history is significant for diabetes arthritis dementia  Prior surgical history significant for colonoscopy, TURP bilateral inguinal hernia repair right knee replacement shoulder surgery  She will history does not smoke and drinks an occasional jim  Indications on admission included Aricept Proscar ilexro Namenda Metformin Vigamox Zoloft aspirin Allegra multivitamins Pred forte  Denies allergies to medications  Family history is noncontributory  Review of systems is significant for the dementia other systems are negative  Physical exam this alert white male has some difficulty answering questions.  He has a low-grade fever  /62 (BP Location: Left arm, Patient Position: Lying)   Pulse 71   Temp 100 °F (37.8 °C) (Oral)   Resp 17   Ht 180.3 cm (70.98\")   Wt 95.5 kg (210 lb 8 oz)   SpO2 94%   BMI 29.37 kg/m²   Heart shows a regular rate and rhythm lungs are clear and equal abdomen shows hypoactive bowel sounds has mild diffuse abdominal tenderness and marked tenderness in the left lower quadrant  CT scan of the abdomen was reviewed by me she is inflammatory change in the sigmoid colon with diverticulosis and possible small area of extraluminal air there is no abscess  His white blood count was 17,000 last evening is 15,000 today his electrolytes are normal LFTs are normal  Assessment diverticulitis, " dementia  Recommendation the risk benefits and options were discussed with the patient and his wife in detail they would like to avoid surgery if possible continue bowel rest IV fluids and IV antibiotics he is on Zosyn.  Recheck labs in the a.m. I will follow him along we will place SCDs and ambulate him.

## 2019-10-01 NOTE — PLAN OF CARE
Problem: Patient Care Overview  Goal: Discharge Needs Assessment  Outcome: Ongoing (interventions implemented as appropriate)   10/01/19 0427 10/01/19 1324   Discharge Needs Assessment   Readmission Within the Last 30 Days --  no previous admission in last 30 days   Concerns to be Addressed --  denies needs/concerns at this time   Patient/Family Anticipates Transition to --  home with family   Patient/Family Anticipated Services at Transition none --    Transportation Anticipated --  family or friend will provide   Anticipated Changes Related to Illness --  none   Equipment Needed After Discharge --  none   Disability   Equipment Currently Used at Home --  bipap/cpap;cane, straight

## 2019-10-01 NOTE — NURSING NOTE
Discharge Planning Assessment  [facility]     Patient Name: Zach Gutierrez  MRN: [id number]  Today's Date: 10/1/2019    Admit Date: 9/30/2019    Discharge Needs Assessment     Row Name 10/01/19 1324       Living Environment    Name(s) of Who Lives With Patient  Wife Maia    Current Living Arrangements  home/apartment/condo    Primary Care Provided by  self    Provides Primary Care For  no one    Family Caregiver if Needed  spouse    Quality of Family Relationships  helpful;involved;supportive    Able to Return to Prior Arrangements  yes       Resource/Environmental Concerns    Resource/Environmental Concerns  none       Transition Planning    Patient/Family Anticipates Transition to  home with family    Transportation Anticipated  family or friend will provide       Discharge Needs Assessment    Readmission Within the Last 30 Days  no previous admission in last 30 days    Concerns to be Addressed  denies needs/concerns at this time    Equipment Currently Used at Home  bipap/cpap;cane, straight    Anticipated Changes Related to Illness  none    Equipment Needed After Discharge  none        Discharge Plan     Row Name 10/01/19 8095       Plan    Plan  Home when stable    Patient/Family in Agreement with Plan  yes    Plan Comments  Spoke with Mr Gutierrez at bedside.  He lives in a home in [place] with his wife.  Facesheet verified.  He does not have home health, or oxygen at home.  He has a CPAP which he curently is not using because it was making noises and his wife has taken it back to [facility] for repair.  He is independent with his ADL's.  His wife does the driving.  His pharmacy is [facility].  He does not have an advanced directive.  Plan is home when stable.  Will continue to follow        Destination      No service coordination in this encounter.      Durable Medical Equipment      No service coordination in this encounter.      Dialysis/Infusion      No service coordination in this encounter.      Home Medical  Care      No service coordination in this encounter.      Therapy      No service coordination in this encounter.      Community Resources      No service coordination in this encounter.          Demographic Summary     Row Name 10/01/19 4871       General Information    Admission Type  inpatient    Arrived From  home    Referral Source  admission list    Reason for Consult  discharge planning    Preferred Language  English     Used During This Interaction  no       Contact Information    Permission Granted to Share Info With          Functional Status    No documentation.       Psychosocial    No documentation.       Abuse/Neglect    No documentation.       Legal    No documentation.       Substance Abuse    No documentation.       Patient Forms    No documentation.           Heike Plata RN

## 2019-10-02 LAB
ANION GAP SERPL CALCULATED.3IONS-SCNC: 10.7 MMOL/L (ref 5–15)
BASOPHILS # BLD AUTO: 0.04 10*3/MM3 (ref 0–0.2)
BASOPHILS NFR BLD AUTO: 0.4 % (ref 0–1.5)
BUN BLD-MCNC: 14 MG/DL (ref 8–23)
BUN/CREAT SERPL: 17.3 (ref 7–25)
CALCIUM SPEC-SCNC: 8.3 MG/DL (ref 8.6–10.5)
CHLORIDE SERPL-SCNC: 108 MMOL/L (ref 98–107)
CO2 SERPL-SCNC: 22.3 MMOL/L (ref 22–29)
CREAT BLD-MCNC: 0.81 MG/DL (ref 0.76–1.27)
DEPRECATED RDW RBC AUTO: 43.7 FL (ref 37–54)
EOSINOPHIL # BLD AUTO: 0.01 10*3/MM3 (ref 0–0.4)
EOSINOPHIL NFR BLD AUTO: 0.1 % (ref 0.3–6.2)
ERYTHROCYTE [DISTWIDTH] IN BLOOD BY AUTOMATED COUNT: 12.4 % (ref 12.3–15.4)
GFR SERPL CREATININE-BSD FRML MDRD: 92 ML/MIN/1.73
GLUCOSE BLD-MCNC: 186 MG/DL (ref 65–99)
GLUCOSE BLDC GLUCOMTR-MCNC: 161 MG/DL (ref 70–130)
GLUCOSE BLDC GLUCOMTR-MCNC: 174 MG/DL (ref 70–130)
GLUCOSE BLDC GLUCOMTR-MCNC: 185 MG/DL (ref 70–130)
GLUCOSE BLDC GLUCOMTR-MCNC: 195 MG/DL (ref 70–130)
GLUCOSE BLDC GLUCOMTR-MCNC: 201 MG/DL (ref 70–130)
HCT VFR BLD AUTO: 39.3 % (ref 37.5–51)
HGB BLD-MCNC: 13 G/DL (ref 13–17.7)
IMM GRANULOCYTES # BLD AUTO: 0.07 10*3/MM3 (ref 0–0.05)
IMM GRANULOCYTES NFR BLD AUTO: 0.6 % (ref 0–0.5)
LYMPHOCYTES # BLD AUTO: 0.9 10*3/MM3 (ref 0.7–3.1)
LYMPHOCYTES NFR BLD AUTO: 8 % (ref 19.6–45.3)
MCH RBC QN AUTO: 31.8 PG (ref 26.6–33)
MCHC RBC AUTO-ENTMCNC: 33.1 G/DL (ref 31.5–35.7)
MCV RBC AUTO: 96.1 FL (ref 79–97)
MONOCYTES # BLD AUTO: 0.61 10*3/MM3 (ref 0.1–0.9)
MONOCYTES NFR BLD AUTO: 5.5 % (ref 5–12)
NEUTROPHILS # BLD AUTO: 9.56 10*3/MM3 (ref 1.7–7)
NEUTROPHILS NFR BLD AUTO: 85.4 % (ref 42.7–76)
NRBC BLD AUTO-RTO: 0 /100 WBC (ref 0–0.2)
PLATELET # BLD AUTO: 196 10*3/MM3 (ref 140–450)
PMV BLD AUTO: 9.1 FL (ref 6–12)
POTASSIUM BLD-SCNC: 3.6 MMOL/L (ref 3.5–5.2)
RBC # BLD AUTO: 4.09 10*6/MM3 (ref 4.14–5.8)
SODIUM BLD-SCNC: 141 MMOL/L (ref 136–145)
WBC NRBC COR # BLD: 11.19 10*3/MM3 (ref 3.4–10.8)

## 2019-10-02 PROCEDURE — 99232 SBSQ HOSP IP/OBS MODERATE 35: CPT | Performed by: SURGERY

## 2019-10-02 PROCEDURE — 80048 BASIC METABOLIC PNL TOTAL CA: CPT | Performed by: SURGERY

## 2019-10-02 PROCEDURE — 99231 SBSQ HOSP IP/OBS SF/LOW 25: CPT | Performed by: INTERNAL MEDICINE

## 2019-10-02 PROCEDURE — 63710000001 INSULIN ASPART PER 5 UNITS: Performed by: HOSPITALIST

## 2019-10-02 PROCEDURE — 25010000002 PIPERACILLIN SOD-TAZOBACTAM PER 1 G: Performed by: HOSPITALIST

## 2019-10-02 PROCEDURE — 82962 GLUCOSE BLOOD TEST: CPT

## 2019-10-02 PROCEDURE — 85025 COMPLETE CBC W/AUTO DIFF WBC: CPT | Performed by: SURGERY

## 2019-10-02 PROCEDURE — 99232 SBSQ HOSP IP/OBS MODERATE 35: CPT | Performed by: HOSPITALIST

## 2019-10-02 PROCEDURE — 25010000002 PIPERACILLIN-TAZOBACTAM: Performed by: HOSPITALIST

## 2019-10-02 PROCEDURE — 63710000001 INSULIN DETEMIR PER 5 UNITS: Performed by: HOSPITALIST

## 2019-10-02 RX ORDER — DEXTROSE, SODIUM CHLORIDE, AND POTASSIUM CHLORIDE 5; .45; .22 G/100ML; G/100ML; G/100ML
50 INJECTION INTRAVENOUS CONTINUOUS
Status: DISCONTINUED | OUTPATIENT
Start: 2019-10-02 | End: 2019-10-05

## 2019-10-02 RX ADMIN — INSULIN DETEMIR 8 UNITS: 100 INJECTION, SOLUTION SUBCUTANEOUS at 21:06

## 2019-10-02 RX ADMIN — SODIUM CHLORIDE 4.5 G: 900 INJECTION, SOLUTION INTRAVENOUS at 09:08

## 2019-10-02 RX ADMIN — INSULIN ASPART 3 UNITS: 100 INJECTION, SOLUTION INTRAVENOUS; SUBCUTANEOUS at 21:06

## 2019-10-02 RX ADMIN — POTASSIUM CHLORIDE, DEXTROSE MONOHYDRATE AND SODIUM CHLORIDE 100 ML/HR: 224; 5; 450 INJECTION, SOLUTION INTRAVENOUS at 12:28

## 2019-10-02 RX ADMIN — SODIUM CHLORIDE 4.5 G: 900 INJECTION, SOLUTION INTRAVENOUS at 15:25

## 2019-10-02 RX ADMIN — SODIUM CHLORIDE 4.5 G: 900 INJECTION, SOLUTION INTRAVENOUS at 22:27

## 2019-10-02 NOTE — PROGRESS NOTES
I am following him up for his diverticulitis.  He is now afebrile.  His abdomen is less tender according to him.  His white blood count is improved at 11,000.  His heart shows regular rate and rhythm his lungs are clear and equal.  His abdomen is soft with minimal left lower quadrant tenderness at this point.  I would continue him n.p.o. today continue his IV fluids his potassium is falling we will change his IV solution.  Recheck labs in the a.m.

## 2019-10-02 NOTE — PROGRESS NOTES
"Hospitalist Team      Patient Care Team:  Jessica Mac MD as PCP - General  Jessica Mac MD as PCP - Family Medicine  Jessica Mac MD as PCP - Claims Attributed        Chief Complaint: F/U Acute diverticulitis (?microperforation)    Subjective    Interval History and ROS:     The patient notes his abdominal pain is better than at the time of admission. He has had not further diarrhea and no N/V today. Tmax to 100 early this AM. HR and BP are WNL. Patient denies any CP, SOA or heart palpitations. Patient with noted memory deficits in conversation.       Objective    Vital Signs  Temp:  [97.8 °F (36.6 °C)-100 °F (37.8 °C)] 97.8 °F (36.6 °C)  Heart Rate:  [70-90] 73  Resp:  [16-18] 16  BP: (107-138)/(62-71) 113/64  Oxygen Therapy  SpO2: 93 %  Pulse Oximetry Type: Continuous  Device (Oxygen Therapy): room air     Flowsheet Rows      First Filed Value   Admission Height  180.3 cm (71\") Documented at 09/30/2019 2030   Admission Weight  94.8 kg (209 lb) Documented at 09/30/2019 2030            Physical Exam:    Physical Exam   Constitutional: He appears well-developed and well-nourished. No distress.   HENT:   Head: Normocephalic and atraumatic.   Mouth/Throat: Oropharynx is clear and moist.   Eyes: Conjunctivae and EOM are normal. No scleral icterus.   Cardiovascular: Normal rate, regular rhythm and normal heart sounds. Exam reveals no gallop and no friction rub.   No murmur heard.  Pulmonary/Chest: Effort normal and breath sounds normal. No respiratory distress. He has no wheezes. He has no rales.   Abdominal: Soft. Bowel sounds are normal. There is tenderness. There is no rebound and no guarding.   TTP most notably in the LLQ, less so in the RLQ.    Musculoskeletal: He exhibits no edema.   Neurological: He is alert.   Oriented x 2   Psychiatric: His behavior is normal.   Vitals reviewed.      Results Review:     I reviewed the patient's new clinical results.    Lab Results (last 24 hours)     Procedure " Component Value Units Date/Time    POC Glucose Once [[id number]]  (Abnormal) Collected:  10/01/19 1802    Specimen:  Blood Updated:  10/01/19 1809     Glucose 186 mg/dL     Comprehensive Metabolic Panel [[id number]]  (Abnormal) Collected:  10/01/19 0520    Specimen:  Blood Updated:  10/01/19 0644     Glucose 237 mg/dL      BUN 17 mg/dL      Creatinine 0.81 mg/dL      Sodium 141 mmol/L      Potassium 3.9 mmol/L      Chloride 103 mmol/L      CO2 23.9 mmol/L      Calcium 9.1 mg/dL      Total Protein 6.6 g/dL      Albumin 4.00 g/dL      ALT (SGPT) 10 U/L      AST (SGOT) 13 U/L      Alkaline Phosphatase 79 U/L      Total Bilirubin 0.6 mg/dL      eGFR Non African Amer 92 mL/min/1.73      Globulin 2.6 gm/dL      A/G Ratio 1.5 g/dL      BUN/Creatinine Ratio 21.0     Anion Gap 14.1 mmol/L     Narrative:       GFR Normal >60  Chronic Kidney Disease <60  Kidney Failure <15    CBC & Differential [[id number]] Collected:  10/01/19 0520    Specimen:  Blood Updated:  10/01/19 0625    Narrative:       The following orders were created for panel order CBC & Differential.  Procedure                               Abnormality         Status                     ---------                               -----------         ------                     CBC Auto Differential[[id number]]        Abnormal            Final result                 Please view results for these tests on the individual orders.    CBC Auto Differential [[id number]]  (Abnormal) Collected:  10/01/19 0520    Specimen:  Blood Updated:  10/01/19 0625     WBC 15.63 10*3/mm3      RBC 4.39 10*6/mm3      Hemoglobin 14.2 g/dL      Hematocrit 41.2 %      MCV 93.8 fL      MCH 32.3 pg      MCHC 34.5 g/dL      RDW 12.2 %      RDW-SD 42.2 fl      MPV 9.8 fL      Platelets 245 10*3/mm3      Neutrophil % 86.3 %      Lymphocyte % 7.1 %      Monocyte % 5.6 %      Eosinophil % 0.0 %      Basophil % 0.3 %      Immature Grans % 0.7 %      Neutrophils, Absolute 13.50 10*3/mm3      Lymphocytes,  Absolute 1.11 10*3/mm3      Monocytes, Absolute 0.87 10*3/mm3      Eosinophils, Absolute 0.00 10*3/mm3      Basophils, Absolute 0.04 10*3/mm3      Immature Grans, Absolute 0.11 10*3/mm3      nRBC 0.0 /100 WBC     Hemoglobin A1c [[id number]]  (Abnormal) Collected:  09/30/19 2106    Specimen:  Blood Updated:  10/01/19 0406     Hemoglobin A1C 7.20 %     Narrative:       Hemoglobin A1C Ranges:    Increased Risk for Diabetes  5.7% to 6.4%  Diabetes                     >= 6.5%  Diabetic Goal                < 7.0%    Magnesium [[id number]]  (Normal) Collected:  09/30/19 2106    Specimen:  Blood Updated:  10/01/19 0032     Magnesium 2.0 mg/dL     Urinalysis, Microscopic Only - Urine, Clean Catch [[id number]]  (Abnormal) Collected:  09/30/19 2222    Specimen:  Urine, Clean Catch Updated:  09/30/19 2237     RBC, UA 0-2 /HPF      WBC, UA 0-2 /HPF      Bacteria, UA Trace /HPF      Squamous Epithelial Cells, UA 0-2 /HPF      Hyaline Casts, UA None Seen /LPF      Methodology Manual Light Microscopy    Urinalysis With Culture If Indicated - Urine, Clean Catch [[id number]]  (Abnormal) Collected:  09/30/19 2222    Specimen:  Urine, Clean Catch Updated:  09/30/19 2236     Color, UA Yellow     Appearance, UA Clear     pH, UA 6.5     Specific Gravity, UA 1.015     Glucose,  mg/dL (2+)     Ketones, UA Trace     Bilirubin, UA Negative     Blood, UA Trace     Protein, UA Negative     Leuk Esterase, UA Negative     Nitrite, UA Negative     Urobilinogen, UA 0.2 E.U./dL    Comprehensive Metabolic Panel [[id number]]  (Abnormal) Collected:  09/30/19 2106    Specimen:  Blood Updated:  09/30/19 2131     Glucose 291 mg/dL      BUN 17 mg/dL      Creatinine 0.97 mg/dL      Sodium 140 mmol/L      Potassium 4.0 mmol/L      Chloride 99 mmol/L      CO2 29.3 mmol/L      Calcium 9.7 mg/dL      Total Protein 7.3 g/dL      Albumin 4.50 g/dL      ALT (SGPT) 12 U/L      AST (SGOT) 14 U/L      Alkaline Phosphatase 97 U/L      Total Bilirubin 0.4 mg/dL       eGFR Non African Amer 75 mL/min/1.73      Globulin 2.8 gm/dL      A/G Ratio 1.6 g/dL      BUN/Creatinine Ratio 17.5     Anion Gap 11.7 mmol/L     Narrative:       GFR Normal >60  Chronic Kidney Disease <60  Kidney Failure <15    CBC & Differential [[id number]] Collected:  09/30/19 2106    Specimen:  Blood Updated:  09/30/19 2116    Narrative:       The following orders were created for panel order CBC & Differential.  Procedure                               Abnormality         Status                     ---------                               -----------         ------                     CBC Auto Differential[[id number]]        Abnormal            Final result                 Please view results for these tests on the individual orders.    CBC Auto Differential [[id number]]  (Abnormal) Collected:  09/30/19 2106    Specimen:  Blood Updated:  09/30/19 2116     WBC 17.61 10*3/mm3      RBC 4.86 10*6/mm3      Hemoglobin 15.7 g/dL      Hematocrit 45.4 %      MCV 93.4 fL      MCH 32.3 pg      MCHC 34.6 g/dL      RDW 12.2 %      RDW-SD 42.1 fl      MPV 9.1 fL      Platelets 266 10*3/mm3      Neutrophil % 90.5 %      Lymphocyte % 4.0 %      Monocyte % 4.4 %      Eosinophil % 0.2 %      Basophil % 0.4 %      Immature Grans % 0.5 %      Neutrophils, Absolute 15.93 10*3/mm3      Lymphocytes, Absolute 0.71 10*3/mm3      Monocytes, Absolute 0.78 10*3/mm3      Eosinophils, Absolute 0.03 10*3/mm3      Basophils, Absolute 0.07 10*3/mm3      Immature Grans, Absolute 0.09 10*3/mm3      nRBC 0.0 /100 WBC           Imaging Results (last 24 hours)     Procedure Component Value Units Date/Time    CT Abdomen Pelvis Without Contrast [[id number]] Collected:  09/30/19 2209     Updated:  09/30/19 2212    Narrative:       CT Abdomen Pelvis WO    INDICATION:   Bilateral flank pain since yesterday.    TECHNIQUE:   CT of the abdomen and pelvis without IV contrast. Coronal and sagittal reconstructions were obtained.  Radiation dose reduction  techniques included automated exposure control or exposure modulation based on body size. Count of known CT and cardiac nuc  med studies performed in previous 12 months: 0.     COMPARISON:   7/21/2008    FINDINGS:  Abdomen: Lung bases are clear. There is a moderate-sized hiatal hernia. The liver, gallbladder, spleen, pancreas, adrenal glands and left kidney normal. The right kidney has a 4 cm cyst is otherwise normal. Aorta is normal in size. There is no  adenopathy. There are numerous sigmoid diverticuli and there are inflammatory changes around the mid sigmoid colon. There may be one bubble of free air but this could also be a thin-walled diverticulum. There is no abscess visible. Rest the bowel is  normal.    Pelvis: Bladder and prostate gland are normal. Bones are unremarkable.      Impression:       Findings are consistent with diverticulitis. There are inflammatory changes around the mid sigmoid colon. There is a 1 cm bubble of air associated with the inflammatory change. Its difficult to tell if this represents air in a thin walled diverticulum or  small collection of extraluminal air. There is no abscess formation. There is no free air identified elsewhere in the abdomen or pelvis.    Otherwise study is negative          Signer Name: Celso Mayberry MD   Signed: 9/30/2019 10:09 PM   Workstation Name: [facility]-    [facility]          ECG/EMG Results (most recent)     None          Medication Review:   I have reviewed the patient's current medication list    Current Facility-Administered Medications:   •  dextrose (D50W) 25 g/ 50mL Intravenous Solution 25 g, 25 g, Intravenous, Q15 Min PRN, Leonel Rahman MD  •  dextrose (GLUTOSE) oral gel 15 g, 15 g, Oral, Q15 Min PRN, Leonel Rahman MD  •  glucagon (GLUCAGEN) injection 1 mg, 1 mg, Subcutaneous, Q15 Min PRN, Leonel Rahman MD  •  HYDROmorphone (DILAUDID) injection 0.5 mg, 0.5 mg, Intravenous, Q4H PRN, Leonel Rahman MD, 0.5 mg at  10/01/19 1240  •  insulin aspart (novoLOG) injection 0-7 Units, 0-7 Units, Subcutaneous, 4x Daily AC & at Bedtime, Leonel Rahman MD  •  piperacillin-tazobactam (ZOSYN) 4.5 g/100 mL 0.9% NS IVPB (mbp), 4.5 g, Intravenous, Q8H, Leonel Rahman MD, 4.5 g at 10/01/19 1538  •  [COMPLETED] Insert peripheral IV, , , Once **AND** sodium chloride 0.9 % flush 10 mL, 10 mL, Intravenous, PRN, Anisa Bueno, PAJose EliasC  •  sodium chloride 0.9 % infusion, 125 mL/hr, Intravenous, Continuous, Leonel Rahman MD, Last Rate: 125 mL/hr at 09/30/19 2239, 125 mL/hr at 09/30/19 2239      Assessment/Plan     -Acute Sigmoid Diverticulitis (? Microperforation): Continue IV Zosyn and IVFs. Continue NPO today.  Dr. Lovelace and Dr. Alatorreet following. WBC decreasing. Patient afebrile today, pain improving, monitor.     -Alzheimer's Dementia: Reviewed Dr. Mac's last clinic note and his dementia continues to worsen.  He is appropriate on my exam and likely is at his baseline.  Holding oral therapy while NPO, patient on Aricept, Namenda and Zoloft at home. Resume when able.      -Diabetes Mellitus, Type 2: HbA1c 7.2.  CT was non-contrasted so Metformin can be resumed when patient is able to take po. For now, continue NPO, monitor Accu checks w/ SSI.     -GERD: On IV Pepcid here.     -ZENA:   Patient's CPAP machine currently with [employer] for repair.     Plan for disposition: Likely home when able.     Elle Anderson MD  10/01/19  8:37 PM

## 2019-10-02 NOTE — PLAN OF CARE
Problem: Patient Care Overview  Goal: Plan of Care Review  Outcome: Ongoing (interventions implemented as appropriate)   10/02/19 9992   Coping/Psychosocial   Plan of Care Reviewed With patient   OTHER   Outcome Summary Patient denies pain this shift. Pt ambulate in luther, up to chair. Continue IV antibiotics. Pt is pleasantly confused but has been cooperative. All care explained. Pt and family verb understanding.   Plan of Care Review   Progress improving

## 2019-10-02 NOTE — PLAN OF CARE
"Problem: Patient Care Overview  Goal: Individualization and Mutuality  Outcome: Ongoing (interventions implemented as appropriate)   10/02/19 1633   Individualization   Patient Specific Preferences \"My friends call me Gamal\"   Patient Specific Interventions Reorient to place, time and situation frequently         "

## 2019-10-02 NOTE — PROGRESS NOTES
"Hospitalist Team      Patient Care Team:  Jessica Mac MD as PCP - General  Jessica Mac MD as PCP - Family Medicine  Jessica Mac MD as PCP - Claims Attributed        Chief Complaint:  F/U Acute diverticulitis (?microperforation)    Subjective    Interval History and ROS:     The patient notes that he is feeling much better today.  He is sitting up in the chair completely dressed talking with his wife and a family friend.  The patient notes that his abdomen does not hurt unless you press on it.  He notes soft bowel movements today but no diarrhea.  Denies any nausea or vomiting.  He is afebrile and his heart rate and blood pressure within normal limits.      Objective    Vital Signs  Temp:  [97.8 °F (36.6 °C)-98.8 °F (37.1 °C)] 98.8 °F (37.1 °C)  Heart Rate:  [73-81] 81  Resp:  [16-17] 17  BP: (109-120)/(61-73) 109/61  Oxygen Therapy  SpO2: 92 %  Pulse Oximetry Type: Continuous  Device (Oxygen Therapy): room air     Flowsheet Rows      First Filed Value   Admission Height  180.3 cm (71\") Documented at 09/30/2019 2030   Admission Weight  94.8 kg (209 lb) Documented at 09/30/2019 2030            Physical Exam:  Physical Exam   Constitutional: He appears well-developed and well-nourished. No distress.   HENT:   Head: Normocephalic and atraumatic.   Mouth/Throat: Oropharynx is clear and moist.   Eyes: Conjunctivae and EOM are normal. No scleral icterus.   Cardiovascular: Normal rate, regular rhythm and normal heart sounds. Exam reveals no gallop and no friction rub.   No murmur heard.  Pulmonary/Chest: Effort normal and breath sounds normal. No respiratory distress. He has no wheezes. He has no rales.   Abdominal: Soft. Bowel sounds are normal. There is tenderness. There is no rebound and no guarding.   Moderate TTP in the bilateral lower quadrants left greater than the right.    Musculoskeletal: He exhibits no edema.   Neurological: He is alert.   Oriented x 2   Psychiatric: His behavior is normal. "   Vitals reviewed.    Results Review:     I reviewed the patient's new clinical results.    Lab Results (last 24 hours)     Procedure Component Value Units Date/Time    POC Glucose Once [[id number]]  (Abnormal) Collected:  10/02/19 1330    Specimen:  Blood Updated:  10/02/19 1338     Glucose 185 mg/dL     POC Glucose Once [[id number]]  (Abnormal) Collected:  10/02/19 0809    Specimen:  Blood Updated:  10/02/19 0817     Glucose 161 mg/dL     Basic Metabolic Panel [[id number]]  (Abnormal) Collected:  10/02/19 0428    Specimen:  Blood Updated:  10/02/19 0507     Glucose 186 mg/dL      BUN 14 mg/dL      Creatinine 0.81 mg/dL      Sodium 141 mmol/L      Potassium 3.6 mmol/L      Chloride 108 mmol/L      CO2 22.3 mmol/L      Calcium 8.3 mg/dL      eGFR Non African Amer 92 mL/min/1.73      BUN/Creatinine Ratio 17.3     Anion Gap 10.7 mmol/L     Narrative:       GFR Normal >60  Chronic Kidney Disease <60  Kidney Failure <15    CBC & Differential [[id number]] Collected:  10/02/19 0428    Specimen:  Blood Updated:  10/02/19 0445    Narrative:       The following orders were created for panel order CBC & Differential.  Procedure                               Abnormality         Status                     ---------                               -----------         ------                     CBC Auto Differential[[id number]]        Abnormal            Final result                 Please view results for these tests on the individual orders.    CBC Auto Differential [[id number]]  (Abnormal) Collected:  10/02/19 0428    Specimen:  Blood Updated:  10/02/19 0445     WBC 11.19 10*3/mm3      RBC 4.09 10*6/mm3      Hemoglobin 13.0 g/dL      Hematocrit 39.3 %      MCV 96.1 fL      MCH 31.8 pg      MCHC 33.1 g/dL      RDW 12.4 %      RDW-SD 43.7 fl      MPV 9.1 fL      Platelets 196 10*3/mm3      Neutrophil % 85.4 %      Lymphocyte % 8.0 %      Monocyte % 5.5 %      Eosinophil % 0.1 %      Basophil % 0.4 %      Immature Grans % 0.6 %       Neutrophils, Absolute 9.56 10*3/mm3      Lymphocytes, Absolute 0.90 10*3/mm3      Monocytes, Absolute 0.61 10*3/mm3      Eosinophils, Absolute 0.01 10*3/mm3      Basophils, Absolute 0.04 10*3/mm3      Immature Grans, Absolute 0.07 10*3/mm3      nRBC 0.0 /100 WBC     POC Glucose Once [[id number]]  (Abnormal) Collected:  10/02/19 0033    Specimen:  Blood Updated:  10/02/19 0040     Glucose 174 mg/dL     POC Glucose Once [[id number]]  (Abnormal) Collected:  10/01/19 1802    Specimen:  Blood Updated:  10/01/19 1809     Glucose 186 mg/dL           Imaging Results (last 24 hours)     ** No results found for the last 24 hours. **        ECG/EMG Results (most recent)     None          Medication Review:   I have reviewed the patient's current medication list    Current Facility-Administered Medications:   •  dextrose (D50W) 25 g/ 50mL Intravenous Solution 25 g, 25 g, Intravenous, Q15 Min PRN, Leonel Rahman MD  •  dextrose (GLUTOSE) oral gel 15 g, 15 g, Oral, Q15 Min PRN, Leonel Rahman MD  •  dextrose 5 % and sodium chloride 0.45 % with KCl 30 mEq/L infusion, 100 mL/hr, Intravenous, Continuous, Zach Lovelace MD, Last Rate: 100 mL/hr at 10/02/19 1228, 100 mL/hr at 10/02/19 1228  •  glucagon (GLUCAGEN) injection 1 mg, 1 mg, Subcutaneous, Q15 Min PRN, Leonel Rahman MD  •  HYDROmorphone (DILAUDID) injection 0.5 mg, 0.5 mg, Intravenous, Q4H PRN, Leonel Rahman MD, 0.5 mg at 10/01/19 1240  •  insulin aspart (novoLOG) injection 0-7 Units, 0-7 Units, Subcutaneous, 4x Daily AC & at Bedtime, Leonel Rahman MD  •  piperacillin-tazobactam (ZOSYN) 4.5 g/100 mL 0.9% NS IVPB (mbp), 4.5 g, Intravenous, Q8H, Leonel Rahman MD, Last Rate: 0 mL/hr at 10/02/19 1130, 4.5 g at 10/02/19 1525  •  [COMPLETED] Insert peripheral IV, , , Once **AND** sodium chloride 0.9 % flush 10 mL, 10 mL, Intravenous, PRN, Anisa Bueno, PAJose EliasC      Assessment/Plan     -Acute Sigmoid Diverticulitis (? Microperforation): Continue IV Zosyn and IVFs.  IV  fluids changed to D5 half-normal saline with 30 of potassium today by surgery.  Continue NPO today.  Dr. Lovelace and Dr. Alatorreet following. WBC continues to improve. Patient afebrile today, pain improving, monitor.      -Alzheimer's Dementia: Per Dr. Mac's last clinic note and his dementia continues to worsen.  He is appropriate on my exam and per his wife is at his baseline.  Holding oral therapy while NPO, patient on Aricept, Namenda and Zoloft at home. Resume when able.      -Diabetes Mellitus, Type 2: HbA1c 7.2.  CT was non-contrasted so Metformin can be resumed when patient is able to take po. For now, continue NPO, monitor Accu checks w/ SSI.  Will add a low-dose of Levemir tonight as bedsides are elevated here.     -GERD: On IV Pepcid here. No acute issues currently.     -ZENA:   Patient's CPAP machine currently with [employer] for repair.     Plan for disposition: Home when able.    Elle Anderson MD  10/02/19  4:15 PM

## 2019-10-02 NOTE — PROGRESS NOTES
GI Daily Progress Note    Assessment/Plan:      Acute diverticulitis       LOS: 2 days     Zach Gutierrez is a 79 y.o. male who was admitted with Diverticulitis. He reports the symptoms are improving with treatment. Still with abd pain but feels its less intense    Subjective:    Patient expresses abdominal pain  Patient denies vomiting, diarrhea and bloody stools    Objective:    Vital signs in last 24 hours:  Temp:  [97.8 °F (36.6 °C)-98.8 °F (37.1 °C)] 98.8 °F (37.1 °C)  Heart Rate:  [73-81] 81  Resp:  [16-17] 17  BP: (109-120)/(61-73) 109/61    Intake/Output last 3 shifts:  I/O last 3 completed shifts:  In: -   Out: 250 [Urine:250]  Intake/Output this shift:  I/O this shift:  In: 1150 [I.V.:950; IV Piggyback:200]  Out: -     Physical Exam:Abdomen  Sounds Normal Active Bowel Sounds   Distension Soft   Tenderness Moderately Tender BLQ     Imaging Results (last 72 hours)     Procedure Component Value Units Date/Time    CT Abdomen Pelvis Without Contrast [[id number]] Collected:  09/30/19 2209     Updated:  09/30/19 2212    Narrative:       CT Abdomen Pelvis WO    INDICATION:   Bilateral flank pain since yesterday.    TECHNIQUE:   CT of the abdomen and pelvis without IV contrast. Coronal and sagittal reconstructions were obtained.  Radiation dose reduction techniques included automated exposure control or exposure modulation based on body size. Count of known CT and cardiac nuc  med studies performed in previous 12 months: 0.     COMPARISON:   7/21/2008    FINDINGS:  Abdomen: Lung bases are clear. There is a moderate-sized hiatal hernia. The liver, gallbladder, spleen, pancreas, adrenal glands and left kidney normal. The right kidney has a 4 cm cyst is otherwise normal. Aorta is normal in size. There is no  adenopathy. There are numerous sigmoid diverticuli and there are inflammatory changes around the mid sigmoid colon. There may be one bubble of free air but this could also be a thin-walled diverticulum. There is no  abscess visible. Rest the bowel is  normal.    Pelvis: Bladder and prostate gland are normal. Bones are unremarkable.      Impression:       Findings are consistent with diverticulitis. There are inflammatory changes around the mid sigmoid colon. There is a 1 cm bubble of air associated with the inflammatory change. Its difficult to tell if this represents air in a thin walled diverticulum or  small collection of extraluminal air. There is no abscess formation. There is no free air identified elsewhere in the abdomen or pelvis.    Otherwise study is negative          Signer Name: Celso Mayberry MD   Signed: 9/30/2019 10:09 PM   Workstation Name: [id number]    [facility]          WBC   Date Value Ref Range Status   10/02/2019 11.19 (H) 3.40 - 10.80 10*3/mm3 Final     RBC   Date Value Ref Range Status   10/02/2019 4.09 (L) 4.14 - 5.80 10*6/mm3 Final     Hemoglobin   Date Value Ref Range Status   10/02/2019 13.0 13.0 - 17.7 g/dL Final     Hematocrit   Date Value Ref Range Status   10/02/2019 39.3 37.5 - 51.0 % Final     MCV   Date Value Ref Range Status   10/02/2019 96.1 79.0 - 97.0 fL Final     MCH   Date Value Ref Range Status   10/02/2019 31.8 26.6 - 33.0 pg Final     MCHC   Date Value Ref Range Status   10/02/2019 33.1 31.5 - 35.7 g/dL Final     RDW   Date Value Ref Range Status   10/02/2019 12.4 12.3 - 15.4 % Final     RDW-SD   Date Value Ref Range Status   10/02/2019 43.7 37.0 - 54.0 fl Final     MPV   Date Value Ref Range Status   10/02/2019 9.1 6.0 - 12.0 fL Final     Platelets   Date Value Ref Range Status   10/02/2019 196 140 - 450 10*3/mm3 Final     Neutrophil %   Date Value Ref Range Status   10/02/2019 85.4 (H) 42.7 - 76.0 % Final     Lymphocyte %   Date Value Ref Range Status   10/02/2019 8.0 (L) 19.6 - 45.3 % Final     Monocyte %   Date Value Ref Range Status   10/02/2019 5.5 5.0 - 12.0 % Final     Eosinophil %   Date Value Ref Range Status   10/02/2019 0.1 (L) 0.3 - 6.2 % Final      Basophil %   Date Value Ref Range Status   10/02/2019 0.4 0.0 - 1.5 % Final     Immature Grans %   Date Value Ref Range Status   10/02/2019 0.6 (H) 0.0 - 0.5 % Final     Neutrophils, Absolute   Date Value Ref Range Status   10/02/2019 9.56 (H) 1.70 - 7.00 10*3/mm3 Final     Lymphocytes, Absolute   Date Value Ref Range Status   10/02/2019 0.90 0.70 - 3.10 10*3/mm3 Final     Monocytes, Absolute   Date Value Ref Range Status   10/02/2019 0.61 0.10 - 0.90 10*3/mm3 Final     Eosinophils, Absolute   Date Value Ref Range Status   10/02/2019 0.01 0.00 - 0.40 10*3/mm3 Final     Basophils, Absolute   Date Value Ref Range Status   10/02/2019 0.04 0.00 - 0.20 10*3/mm3 Final     Immature Grans, Absolute   Date Value Ref Range Status   10/02/2019 0.07 (H) 0.00 - 0.05 10*3/mm3 Final     nRBC   Date Value Ref Range Status   10/02/2019 0.0 0.0 - 0.2 /100 WBC Final       Glucose   Date Value Ref Range Status   10/02/2019 186 (H) 65 - 99 mg/dL Final     Sodium   Date Value Ref Range Status   10/02/2019 141 136 - 145 mmol/L Final     Potassium   Date Value Ref Range Status   10/02/2019 3.6 3.5 - 5.2 mmol/L Final     CO2   Date Value Ref Range Status   10/02/2019 22.3 22.0 - 29.0 mmol/L Final     Chloride   Date Value Ref Range Status   10/02/2019 108 (H) 98 - 107 mmol/L Final     Anion Gap   Date Value Ref Range Status   10/02/2019 10.7 5.0 - 15.0 mmol/L Final     Creatinine   Date Value Ref Range Status   10/02/2019 0.81 0.76 - 1.27 mg/dL Final     BUN   Date Value Ref Range Status   10/02/2019 14 8 - 23 mg/dL Final     BUN/Creatinine Ratio   Date Value Ref Range Status   10/02/2019 17.3 7.0 - 25.0 Final     Calcium   Date Value Ref Range Status   10/02/2019 8.3 (L) 8.6 - 10.5 mg/dL Final     eGFR Non  Amer   Date Value Ref Range Status   10/02/2019 92 >60 mL/min/1.73 Final     Alkaline Phosphatase   Date Value Ref Range Status   10/01/2019 79 39 - 117 U/L Final     Total Protein   Date Value Ref Range Status   10/01/2019  6.6 6.0 - 8.5 g/dL Final     ALT (SGPT)   Date Value Ref Range Status   10/01/2019 10 1 - 41 U/L Final     AST (SGOT)   Date Value Ref Range Status   10/01/2019 13 1 - 40 U/L Final     Total Bilirubin   Date Value Ref Range Status   10/01/2019 0.6 0.2 - 1.2 mg/dL Final     Albumin   Date Value Ref Range Status   10/01/2019 4.00 3.50 - 5.20 g/dL Final     Globulin   Date Value Ref Range Status   10/01/2019 2.6 gm/dL Final     Diverticulitis-?able Microperforation  Leukocytosis  Dementia  DM II  Hiatal hernia    Clinically improved but gets confused late in the day, following surgery's lead.

## 2019-10-03 LAB
ANION GAP SERPL CALCULATED.3IONS-SCNC: 12.3 MMOL/L (ref 5–15)
BASOPHILS # BLD AUTO: 0.04 10*3/MM3 (ref 0–0.2)
BASOPHILS NFR BLD AUTO: 0.4 % (ref 0–1.5)
BUN BLD-MCNC: 11 MG/DL (ref 8–23)
BUN/CREAT SERPL: 15.1 (ref 7–25)
CALCIUM SPEC-SCNC: 8.5 MG/DL (ref 8.6–10.5)
CHLORIDE SERPL-SCNC: 107 MMOL/L (ref 98–107)
CO2 SERPL-SCNC: 21.7 MMOL/L (ref 22–29)
CREAT BLD-MCNC: 0.73 MG/DL (ref 0.76–1.27)
DEPRECATED RDW RBC AUTO: 43.7 FL (ref 37–54)
EOSINOPHIL # BLD AUTO: 0.04 10*3/MM3 (ref 0–0.4)
EOSINOPHIL NFR BLD AUTO: 0.4 % (ref 0.3–6.2)
ERYTHROCYTE [DISTWIDTH] IN BLOOD BY AUTOMATED COUNT: 12.3 % (ref 12.3–15.4)
GFR SERPL CREATININE-BSD FRML MDRD: 104 ML/MIN/1.73
GLUCOSE BLD-MCNC: 158 MG/DL (ref 65–99)
GLUCOSE BLDC GLUCOMTR-MCNC: 131 MG/DL (ref 70–130)
GLUCOSE BLDC GLUCOMTR-MCNC: 162 MG/DL (ref 70–130)
GLUCOSE BLDC GLUCOMTR-MCNC: 227 MG/DL (ref 70–130)
GLUCOSE BLDC GLUCOMTR-MCNC: 308 MG/DL (ref 70–130)
HCT VFR BLD AUTO: 40.7 % (ref 37.5–51)
HGB BLD-MCNC: 13.6 G/DL (ref 13–17.7)
IMM GRANULOCYTES # BLD AUTO: 0.04 10*3/MM3 (ref 0–0.05)
IMM GRANULOCYTES NFR BLD AUTO: 0.4 % (ref 0–0.5)
LYMPHOCYTES # BLD AUTO: 0.96 10*3/MM3 (ref 0.7–3.1)
LYMPHOCYTES NFR BLD AUTO: 10.1 % (ref 19.6–45.3)
MCH RBC QN AUTO: 32.2 PG (ref 26.6–33)
MCHC RBC AUTO-ENTMCNC: 33.4 G/DL (ref 31.5–35.7)
MCV RBC AUTO: 96.4 FL (ref 79–97)
MONOCYTES # BLD AUTO: 0.58 10*3/MM3 (ref 0.1–0.9)
MONOCYTES NFR BLD AUTO: 6.1 % (ref 5–12)
NEUTROPHILS # BLD AUTO: 7.87 10*3/MM3 (ref 1.7–7)
NEUTROPHILS NFR BLD AUTO: 82.6 % (ref 42.7–76)
NRBC BLD AUTO-RTO: 0 /100 WBC (ref 0–0.2)
PLATELET # BLD AUTO: 216 10*3/MM3 (ref 140–450)
PMV BLD AUTO: 9.2 FL (ref 6–12)
POTASSIUM BLD-SCNC: 3.6 MMOL/L (ref 3.5–5.2)
RBC # BLD AUTO: 4.22 10*6/MM3 (ref 4.14–5.8)
SODIUM BLD-SCNC: 141 MMOL/L (ref 136–145)
WBC NRBC COR # BLD: 9.53 10*3/MM3 (ref 3.4–10.8)

## 2019-10-03 PROCEDURE — 85025 COMPLETE CBC W/AUTO DIFF WBC: CPT | Performed by: SURGERY

## 2019-10-03 PROCEDURE — 82962 GLUCOSE BLOOD TEST: CPT

## 2019-10-03 PROCEDURE — 94799 UNLISTED PULMONARY SVC/PX: CPT

## 2019-10-03 PROCEDURE — 80048 BASIC METABOLIC PNL TOTAL CA: CPT | Performed by: SURGERY

## 2019-10-03 PROCEDURE — 25010000002 PIPERACILLIN SOD-TAZOBACTAM PER 1 G: Performed by: HOSPITALIST

## 2019-10-03 PROCEDURE — 63710000001 INSULIN ASPART PER 5 UNITS: Performed by: HOSPITALIST

## 2019-10-03 PROCEDURE — 63710000001 INSULIN DETEMIR PER 5 UNITS: Performed by: HOSPITALIST

## 2019-10-03 PROCEDURE — 99231 SBSQ HOSP IP/OBS SF/LOW 25: CPT | Performed by: SURGERY

## 2019-10-03 PROCEDURE — 99232 SBSQ HOSP IP/OBS MODERATE 35: CPT | Performed by: HOSPITALIST

## 2019-10-03 PROCEDURE — 99232 SBSQ HOSP IP/OBS MODERATE 35: CPT | Performed by: INTERNAL MEDICINE

## 2019-10-03 RX ORDER — LOPERAMIDE HYDROCHLORIDE 2 MG/1
4 CAPSULE ORAL 3 TIMES DAILY PRN
Status: DISCONTINUED | OUTPATIENT
Start: 2019-10-03 | End: 2019-10-06 | Stop reason: HOSPADM

## 2019-10-03 RX ORDER — L.ACID,PARA/B.BIFIDUM/S.THERM 8B CELL
1 CAPSULE ORAL DAILY
Status: DISCONTINUED | OUTPATIENT
Start: 2019-10-03 | End: 2019-10-06 | Stop reason: HOSPADM

## 2019-10-03 RX ORDER — CHOLECALCIFEROL (VITAMIN D3) 125 MCG
CAPSULE ORAL
Status: DISPENSED
Start: 2019-10-03 | End: 2019-10-04

## 2019-10-03 RX ORDER — CHOLECALCIFEROL (VITAMIN D3) 125 MCG
5 CAPSULE ORAL NIGHTLY
Status: DISCONTINUED | OUTPATIENT
Start: 2019-10-03 | End: 2019-10-06 | Stop reason: HOSPADM

## 2019-10-03 RX ADMIN — POTASSIUM CHLORIDE, DEXTROSE MONOHYDRATE AND SODIUM CHLORIDE 100 ML/HR: 224; 5; 450 INJECTION, SOLUTION INTRAVENOUS at 11:47

## 2019-10-03 RX ADMIN — LOPERAMIDE HYDROCHLORIDE 4 MG: 2 CAPSULE ORAL at 19:46

## 2019-10-03 RX ADMIN — MELATONIN TAB 5 MG 5 MG: 5 TAB at 20:08

## 2019-10-03 RX ADMIN — SODIUM CHLORIDE 4.5 G: 900 INJECTION, SOLUTION INTRAVENOUS at 14:50

## 2019-10-03 RX ADMIN — INSULIN DETEMIR 8 UNITS: 100 INJECTION, SOLUTION SUBCUTANEOUS at 23:35

## 2019-10-03 RX ADMIN — INSULIN ASPART 2 UNITS: 100 INJECTION, SOLUTION INTRAVENOUS; SUBCUTANEOUS at 09:08

## 2019-10-03 RX ADMIN — Medication 1 CAPSULE: at 19:06

## 2019-10-03 RX ADMIN — INSULIN ASPART 4 UNITS: 100 INJECTION, SOLUTION INTRAVENOUS; SUBCUTANEOUS at 23:38

## 2019-10-03 RX ADMIN — SODIUM CHLORIDE 4.5 G: 900 INJECTION, SOLUTION INTRAVENOUS at 09:08

## 2019-10-03 RX ADMIN — INSULIN ASPART 5 UNITS: 100 INJECTION, SOLUTION INTRAVENOUS; SUBCUTANEOUS at 19:05

## 2019-10-03 NOTE — PLAN OF CARE
Problem: Patient Care Overview  Goal: Individualization and Mutuality  Outcome: Ongoing (interventions implemented as appropriate)   10/03/19 1810   Individualization   Patient Specific Goals (Include Timeframe) Free from falls 10/9/19   Patient Specific Interventions Use of bed and chair alarms, family at bedside during day shift   Mutuality/Individual Preferences   How to Address Anxieties/Fears Explain all care in language that is easy to understand

## 2019-10-03 NOTE — PROGRESS NOTES
"Hospitalist Team      Patient Care Team:  Jessica Mac MD as PCP - General  Jessica Mac MD as PCP - Family Medicine  Jessica Mac MD as PCP - Claims Attributed        Chief Complaint:  F/U Acute diverticulitis (? microperforation)    Subjective    Interval History and ROS:     The patient's wife notes he did not sleep well overnight last night. He notes his pain feels nearly resolve and states his abdomen is barely sore today. He is having some diarrhea. He denies any N/V. He denies any CP, SOA or heart palpitations. He is afebrile.       Objective    Vital Signs  Temp:  [97.1 °F (36.2 °C)-99 °F (37.2 °C)] 97.1 °F (36.2 °C)  Heart Rate:  [64-82] 64  Resp:  [18] 18  BP: (111-116)/(68-71) 116/68  Oxygen Therapy  SpO2: 96 %  Pulse Oximetry Type: Intermittent  Device (Oxygen Therapy): room air     Flowsheet Rows      First Filed Value   Admission Height  180.3 cm (71\") Documented at 09/30/2019 2030   Admission Weight  94.8 kg (209 lb) Documented at 09/30/2019 2030            Physical Exam:  Physical Exam   Constitutional: He appears well-developed and well-nourished. No distress.   HENT:   Head: Normocephalic and atraumatic.   Mouth/Throat: Oropharynx is clear and moist.   Eyes: Conjunctivae and EOM are normal. No scleral icterus.   Cardiovascular: Normal rate, regular rhythm and normal heart sounds. Exam reveals no gallop and no friction rub.   No murmur heard.  Pulmonary/Chest: Effort normal and breath sounds normal. No respiratory distress. He has no wheezes. He has no rales.   Abdominal: Soft. Bowel sounds are normal. There is tenderness. There is no rebound and no guarding.   Mild TTP in the bilateral lower quadrants left greater than the right.    Musculoskeletal: He exhibits no edema.   Neurological: He is alert.   Oriented x 2   Psychiatric: His behavior is normal.   Vitals reviewed.    Results Review:     I reviewed the patient's new clinical results.    Lab Results (last 24 hours)     Procedure " Component Value Units Date/Time    POC Glucose Once [[id number]]  (Abnormal) Collected:  10/03/19 1848    Specimen:  Blood Updated:  10/03/19 1855     Glucose 308 mg/dL     POC Glucose Once [[id number]]  (Abnormal) Collected:  10/03/19 1215    Specimen:  Blood Updated:  10/03/19 1228     Glucose 131 mg/dL     POC Glucose Once [[id number]]  (Abnormal) Collected:  10/03/19 0716    Specimen:  Blood Updated:  10/03/19 0723     Glucose 162 mg/dL     Basic Metabolic Panel [[id number]]  (Abnormal) Collected:  10/03/19 0456    Specimen:  Blood Updated:  10/03/19 0543     Glucose 158 mg/dL      BUN 11 mg/dL      Creatinine 0.73 mg/dL      Sodium 141 mmol/L      Potassium 3.6 mmol/L      Chloride 107 mmol/L      CO2 21.7 mmol/L      Calcium 8.5 mg/dL      eGFR Non African Amer 104 mL/min/1.73      BUN/Creatinine Ratio 15.1     Anion Gap 12.3 mmol/L     Narrative:       GFR Normal >60  Chronic Kidney Disease <60  Kidney Failure <15    CBC & Differential [[id number]] Collected:  10/03/19 0456    Specimen:  Blood Updated:  10/03/19 0521    Narrative:       The following orders were created for panel order CBC & Differential.  Procedure                               Abnormality         Status                     ---------                               -----------         ------                     CBC Auto Differential[[id number]]        Abnormal            Final result                 Please view results for these tests on the individual orders.    CBC Auto Differential [[id number]]  (Abnormal) Collected:  10/03/19 0456    Specimen:  Blood Updated:  10/03/19 0521     WBC 9.53 10*3/mm3      RBC 4.22 10*6/mm3      Hemoglobin 13.6 g/dL      Hematocrit 40.7 %      MCV 96.4 fL      MCH 32.2 pg      MCHC 33.4 g/dL      RDW 12.3 %      RDW-SD 43.7 fl      MPV 9.2 fL      Platelets 216 10*3/mm3      Neutrophil % 82.6 %      Lymphocyte % 10.1 %      Monocyte % 6.1 %      Eosinophil % 0.4 %      Basophil % 0.4 %      Immature Grans % 0.4 %       Neutrophils, Absolute 7.87 10*3/mm3      Lymphocytes, Absolute 0.96 10*3/mm3      Monocytes, Absolute 0.58 10*3/mm3      Eosinophils, Absolute 0.04 10*3/mm3      Basophils, Absolute 0.04 10*3/mm3      Immature Grans, Absolute 0.04 10*3/mm3      nRBC 0.0 /100 WBC     POC Glucose Once [[id number]]  (Abnormal) Collected:  10/02/19 2044    Specimen:  Blood Updated:  10/02/19 2050     Glucose 201 mg/dL           Imaging Results (last 24 hours)     ** No results found for the last 24 hours. **        ECG/EMG Results (most recent)     None          Medication Review:   I have reviewed the patient's current medication list    Current Facility-Administered Medications:   •  dextrose (D50W) 25 g/ 50mL Intravenous Solution 25 g, 25 g, Intravenous, Q15 Min PRN, Leonel Rahman MD  •  dextrose (GLUTOSE) oral gel 15 g, 15 g, Oral, Q15 Min PRN, Leonel Rahman MD  •  dextrose 5 % and sodium chloride 0.45 % with KCl 30 mEq/L infusion, 100 mL/hr, Intravenous, Continuous, Zach Lovelace MD, Last Rate: 100 mL/hr at 10/03/19 1147, 100 mL/hr at 10/03/19 1147  •  glucagon (GLUCAGEN) injection 1 mg, 1 mg, Subcutaneous, Q15 Min PRN, Leonel Rahman MD  •  HYDROmorphone (DILAUDID) injection 0.5 mg, 0.5 mg, Intravenous, Q4H PRN, Leonel Rahman MD, 0.5 mg at 10/01/19 1240  •  insulin aspart (novoLOG) injection 0-7 Units, 0-7 Units, Subcutaneous, 4x Daily AC & at Bedtime, Leonel Rahman MD, 5 Units at 10/03/19 1905  •  insulin detemir (LEVEMIR) injection 8 Units, 8 Units, Subcutaneous, Nightly, Elle Anderson MD, 8 Units at 10/02/19 2106  •  lactobacillus acidophilus (RISAQUAD) capsule 1 capsule, 1 capsule, Oral, Daily, Schuyler, Srikanth Thomas MD, 1 capsule at 10/03/19 1906  •  loperamide (IMODIUM) capsule 4 mg, 4 mg, Oral, TID PRN, SchuylerSrikanth MD, 4 mg at 10/03/19 1946  •  piperacillin-tazobactam (ZOSYN) 4.5 g/100 mL 0.9% NS IVPB (mbp), 4.5 g, Intravenous, Q8H, Leonel Rahman MD, Last Rate: 0 mL/hr at  10/02/19 2257, 4.5 g at 10/03/19 1450  •  [COMPLETED] Insert peripheral IV, , , Once **AND** sodium chloride 0.9 % flush 10 mL, 10 mL, Intravenous, PRN, Anisa Bueno, AUGUSTUS      Assessment/Plan     -Acute Sigmoid Diverticulitis (? Microperforation): Continue IV Zosyn and IVFs for now.  Surgery saw patient today and started him on clear liquids.  Dr. Payan also following and added probiotic and imodium. WBC now WNL and patient is afebrile, pain improving, monitor.      -Alzheimer's Dementia: Per Dr. Mac's last clinic note, his dementia continues to worsen.  He is appropriate on my exam and per his wife is at his baseline.  Holding oral therapy while NPO, patient on Aricept, Namenda and Zoloft at home. Resume tomorrow if patient tolerates po tonight.      -Diabetes Mellitus, Type 2: HbA1c 7.2.  CT was non-contrasted so Metformin can be resumed when patient is able to take po. For now patient is on a low dose of basal insulin here, monitor Accu checks w/ SSI.  Will change from Q6H to QAC and HS now that patient is on clear liquids.      -GERD: On IV Pepcid here. No acute issues currently.     -ZENA:   Patient's CPAP machine currently with Xigen for repair.    -Insomnia:  Will add melatonin tonight to help with sleep.     Plan for disposition: Home when able    Elle Anderson MD  10/03/19  7:46 PM

## 2019-10-03 NOTE — PLAN OF CARE
Problem: Patient Care Overview  Goal: Plan of Care Review  Outcome: Ongoing (interventions implemented as appropriate)   10/03/19 7214   Coping/Psychosocial   Plan of Care Reviewed With patient   OTHER   Outcome Summary Up to chair most of the shift, family has been at bedside. Tolerating clear liquid diet. Denies pain this shift. Continue IV antibiotics   Plan of Care Review   Progress improving

## 2019-10-03 NOTE — PROGRESS NOTES
GI Daily Progress Note    Assessment/Plan:      Acute diverticulitis       LOS: 3 days     Zach Gutierrez is a 79 y.o. male who was admitted with Diverticulitis. He reports the symptoms are improving with treatment. Up in chair taking his clears. Wife complains of his diarrhea so will start a Probiotic an order PRN imodium    Subjective:    Patient expresses abdominal pain and diarrhea  Patient denies vomiting and bloody stools    Objective:    Vital signs in last 24 hours:  Temp:  [97.1 °F (36.2 °C)-99 °F (37.2 °C)] 97.1 °F (36.2 °C)  Heart Rate:  [64-82] 64  Resp:  [18] 18  BP: (111-117)/(64-71) 116/68    Intake/Output last 3 shifts:  I/O last 3 completed shifts:  In: 1150 [I.V.:950; IV Piggyback:200]  Out: -   Intake/Output this shift:  I/O this shift:  In: 1530 [P.O.:480; I.V.:950; IV Piggyback:100]  Out: -     Physical Exam:Abdomen  Sounds Normal Active Bowel Sounds   Distension Soft   Tenderness Mildly Tender     Imaging Results (last 72 hours)     Procedure Component Value Units Date/Time    CT Abdomen Pelvis Without Contrast [[id number]] Collected:  09/30/19 2209     Updated:  09/30/19 2212    Narrative:       CT Abdomen Pelvis WO    INDICATION:   Bilateral flank pain since yesterday.    TECHNIQUE:   CT of the abdomen and pelvis without IV contrast. Coronal and sagittal reconstructions were obtained.  Radiation dose reduction techniques included automated exposure control or exposure modulation based on body size. Count of known CT and cardiac nuc  med studies performed in previous 12 months: 0.     COMPARISON:   7/21/2008    FINDINGS:  Abdomen: Lung bases are clear. There is a moderate-sized hiatal hernia. The liver, gallbladder, spleen, pancreas, adrenal glands and left kidney normal. The right kidney has a 4 cm cyst is otherwise normal. Aorta is normal in size. There is no  adenopathy. There are numerous sigmoid diverticuli and there are inflammatory changes around the mid sigmoid colon. There may be one  bubble of free air but this could also be a thin-walled diverticulum. There is no abscess visible. Rest the bowel is  normal.    Pelvis: Bladder and prostate gland are normal. Bones are unremarkable.      Impression:       Findings are consistent with diverticulitis. There are inflammatory changes around the mid sigmoid colon. There is a 1 cm bubble of air associated with the inflammatory change. Its difficult to tell if this represents air in a thin walled diverticulum or  small collection of extraluminal air. There is no abscess formation. There is no free air identified elsewhere in the abdomen or pelvis.    Otherwise study is negative          Signer Name: Celso Mayberry MD   Signed: 9/30/2019 10:09 PM   Workstation Name: [id number]    [facility]          WBC   Date Value Ref Range Status   10/03/2019 9.53 3.40 - 10.80 10*3/mm3 Final     RBC   Date Value Ref Range Status   10/03/2019 4.22 4.14 - 5.80 10*6/mm3 Final     Hemoglobin   Date Value Ref Range Status   10/03/2019 13.6 13.0 - 17.7 g/dL Final     Hematocrit   Date Value Ref Range Status   10/03/2019 40.7 37.5 - 51.0 % Final     MCV   Date Value Ref Range Status   10/03/2019 96.4 79.0 - 97.0 fL Final     MCH   Date Value Ref Range Status   10/03/2019 32.2 26.6 - 33.0 pg Final     MCHC   Date Value Ref Range Status   10/03/2019 33.4 31.5 - 35.7 g/dL Final     RDW   Date Value Ref Range Status   10/03/2019 12.3 12.3 - 15.4 % Final     RDW-SD   Date Value Ref Range Status   10/03/2019 43.7 37.0 - 54.0 fl Final     MPV   Date Value Ref Range Status   10/03/2019 9.2 6.0 - 12.0 fL Final     Platelets   Date Value Ref Range Status   10/03/2019 216 140 - 450 10*3/mm3 Final     Neutrophil %   Date Value Ref Range Status   10/03/2019 82.6 (H) 42.7 - 76.0 % Final     Lymphocyte %   Date Value Ref Range Status   10/03/2019 10.1 (L) 19.6 - 45.3 % Final     Monocyte %   Date Value Ref Range Status   10/03/2019 6.1 5.0 - 12.0 % Final     Eosinophil  %   Date Value Ref Range Status   10/03/2019 0.4 0.3 - 6.2 % Final     Basophil %   Date Value Ref Range Status   10/03/2019 0.4 0.0 - 1.5 % Final     Immature Grans %   Date Value Ref Range Status   10/03/2019 0.4 0.0 - 0.5 % Final     Neutrophils, Absolute   Date Value Ref Range Status   10/03/2019 7.87 (H) 1.70 - 7.00 10*3/mm3 Final     Lymphocytes, Absolute   Date Value Ref Range Status   10/03/2019 0.96 0.70 - 3.10 10*3/mm3 Final     Monocytes, Absolute   Date Value Ref Range Status   10/03/2019 0.58 0.10 - 0.90 10*3/mm3 Final     Eosinophils, Absolute   Date Value Ref Range Status   10/03/2019 0.04 0.00 - 0.40 10*3/mm3 Final     Basophils, Absolute   Date Value Ref Range Status   10/03/2019 0.04 0.00 - 0.20 10*3/mm3 Final     Immature Grans, Absolute   Date Value Ref Range Status   10/03/2019 0.04 0.00 - 0.05 10*3/mm3 Final     nRBC   Date Value Ref Range Status   10/03/2019 0.0 0.0 - 0.2 /100 WBC Final       Glucose   Date Value Ref Range Status   10/03/2019 158 (H) 65 - 99 mg/dL Final     Sodium   Date Value Ref Range Status   10/03/2019 141 136 - 145 mmol/L Final     Potassium   Date Value Ref Range Status   10/03/2019 3.6 3.5 - 5.2 mmol/L Final     CO2   Date Value Ref Range Status   10/03/2019 21.7 (L) 22.0 - 29.0 mmol/L Final     Chloride   Date Value Ref Range Status   10/03/2019 107 98 - 107 mmol/L Final     Anion Gap   Date Value Ref Range Status   10/03/2019 12.3 5.0 - 15.0 mmol/L Final     Creatinine   Date Value Ref Range Status   10/03/2019 0.73 (L) 0.76 - 1.27 mg/dL Final     BUN   Date Value Ref Range Status   10/03/2019 11 8 - 23 mg/dL Final     BUN/Creatinine Ratio   Date Value Ref Range Status   10/03/2019 15.1 7.0 - 25.0 Final     Calcium   Date Value Ref Range Status   10/03/2019 8.5 (L) 8.6 - 10.5 mg/dL Final     eGFR Non  Amer   Date Value Ref Range Status   10/03/2019 104 >60 mL/min/1.73 Final     Alkaline Phosphatase   Date Value Ref Range Status   10/01/2019 79 39 - 117 U/L  Final     Total Protein   Date Value Ref Range Status   10/01/2019 6.6 6.0 - 8.5 g/dL Final     ALT (SGPT)   Date Value Ref Range Status   10/01/2019 10 1 - 41 U/L Final     AST (SGOT)   Date Value Ref Range Status   10/01/2019 13 1 - 40 U/L Final     Total Bilirubin   Date Value Ref Range Status   10/01/2019 0.6 0.2 - 1.2 mg/dL Final     Albumin   Date Value Ref Range Status   10/01/2019 4.00 3.50 - 5.20 g/dL Final     Globulin   Date Value Ref Range Status   10/01/2019 2.6 gm/dL Final     Diverticulitis-?able Microperforation  Leukocytosis  Dementia  DM II  Hiatal hernia    Start Probiotic, Imodium prn > 3 BMs a day

## 2019-10-03 NOTE — PROGRESS NOTES
Daily Progress Note    Chief Complaint: following diverticulitis    Subjective     Patient reports abdominal pain has resolved, no N&V, sitting up in chair, BM yesterday    Objective     Vital signs in last 24 hours:  Temp:  [98.7 °F (37.1 °C)-99 °F (37.2 °C)] 99 °F (37.2 °C)  Heart Rate:  [73-82] 82  Resp:  [17-18] 18  BP: (109-117)/(61-71) 111/71    Intake/Output last 3 shifts:  I/O last 3 completed shifts:  In: 1150 [I.V.:950; IV Piggyback:200]  Out: -     Physical Exam:  Respiratory:  normal inspiratory effort  CV: RRR, no JVD  Abd: Positive BS, soft, ND, NT, no rebound, no guarding  Ext:  No cyanosis, no edema    Results from last 7 days   Lab Units 10/03/19  0456   WBC 10*3/mm3 9.53   HEMOGLOBIN g/dL 13.6   HEMATOCRIT % 40.7   PLATELETS 10*3/mm3 216     Results from last 7 days   Lab Units 10/03/19  0456   SODIUM mmol/L 141   POTASSIUM mmol/L 3.6   CHLORIDE mmol/L 107   CO2 mmol/L 21.7*   BUN mg/dL 11   CREATININE mg/dL 0.73*   GLUCOSE mg/dL 158*   CALCIUM mg/dL 8.5*       Assessment/Plan   Diverticulitis - abdominal pain resolved, leukocytosis resolved, no fevers, bowel functioning   Will try clear liquids and if tolerates will d/c IVFs in am     Anabella Dominguez MD  General, Minimally Invasive and Endoscopic Surgery  [facility]    [facility] [address]    [address]  [place], [place] [id number] [place] [id number]    P: [phone]  F: [phone]    Cc:  Jessica Mac MD

## 2019-10-03 NOTE — PLAN OF CARE
Problem: Patient Care Overview  Goal: Plan of Care Review   10/03/19 0317   Coping/Psychosocial   Plan of Care Reviewed With patient   OTHER   Outcome Summary Patient denies pain. Patient is confused and has to be reoriented often.        Problem: Skin Injury Risk (Adult)  Goal: Identify Related Risk Factors and Signs and Symptoms  Outcome: Ongoing (interventions implemented as appropriate)    Goal: Skin Health and Integrity  Outcome: Ongoing (interventions implemented as appropriate)      Problem: Fall Risk (Adult)  Goal: Identify Related Risk Factors and Signs and Symptoms  Outcome: Ongoing (interventions implemented as appropriate)    Goal: Absence of Fall  Outcome: Ongoing (interventions implemented as appropriate)

## 2019-10-03 NOTE — NURSING NOTE
Continued Stay Note  [facility]     Patient Name: Zach Gutierrez  MRN: [id number]  Today's Date: 10/3/2019    Admit Date: 9/30/2019    Discharge Plan     Row Name 10/03/19 1300       Plan    Plan Comments  Spoke with Mrs Gutierrez.  Pt is sleeping at this time.  Mrs. Gutierrez intends to take pt home when medically ready.  She states he is more comfortable and mentally stable in his home environment.  She denies any needs r/t discharge at this time and states she feels confident in caring for pt at home.  CM will continue to follow for needs.          Discharge Codes    No documentation.             Cash Burciaga RN

## 2019-10-04 LAB
ANION GAP SERPL CALCULATED.3IONS-SCNC: 13.2 MMOL/L (ref 5–15)
BASOPHILS # BLD AUTO: 0.03 10*3/MM3 (ref 0–0.2)
BASOPHILS NFR BLD AUTO: 0.5 % (ref 0–1.5)
BUN BLD-MCNC: 6 MG/DL (ref 8–23)
BUN/CREAT SERPL: 8.5 (ref 7–25)
CALCIUM SPEC-SCNC: 8.4 MG/DL (ref 8.6–10.5)
CHLORIDE SERPL-SCNC: 108 MMOL/L (ref 98–107)
CO2 SERPL-SCNC: 22.8 MMOL/L (ref 22–29)
CREAT BLD-MCNC: 0.71 MG/DL (ref 0.76–1.27)
DEPRECATED RDW RBC AUTO: 42.5 FL (ref 37–54)
EOSINOPHIL # BLD AUTO: 0.07 10*3/MM3 (ref 0–0.4)
EOSINOPHIL NFR BLD AUTO: 1.2 % (ref 0.3–6.2)
ERYTHROCYTE [DISTWIDTH] IN BLOOD BY AUTOMATED COUNT: 12.2 % (ref 12.3–15.4)
GFR SERPL CREATININE-BSD FRML MDRD: 108 ML/MIN/1.73
GLUCOSE BLD-MCNC: 122 MG/DL (ref 65–99)
GLUCOSE BLDC GLUCOMTR-MCNC: 122 MG/DL (ref 70–130)
GLUCOSE BLDC GLUCOMTR-MCNC: 125 MG/DL (ref 70–130)
GLUCOSE BLDC GLUCOMTR-MCNC: 142 MG/DL (ref 70–130)
GLUCOSE BLDC GLUCOMTR-MCNC: 145 MG/DL (ref 70–130)
HCT VFR BLD AUTO: 37.4 % (ref 37.5–51)
HGB BLD-MCNC: 12.7 G/DL (ref 13–17.7)
IMM GRANULOCYTES # BLD AUTO: 0.02 10*3/MM3 (ref 0–0.05)
IMM GRANULOCYTES NFR BLD AUTO: 0.4 % (ref 0–0.5)
LYMPHOCYTES # BLD AUTO: 1.04 10*3/MM3 (ref 0.7–3.1)
LYMPHOCYTES NFR BLD AUTO: 18.2 % (ref 19.6–45.3)
MCH RBC QN AUTO: 32.2 PG (ref 26.6–33)
MCHC RBC AUTO-ENTMCNC: 34 G/DL (ref 31.5–35.7)
MCV RBC AUTO: 94.9 FL (ref 79–97)
MONOCYTES # BLD AUTO: 0.44 10*3/MM3 (ref 0.1–0.9)
MONOCYTES NFR BLD AUTO: 7.7 % (ref 5–12)
NEUTROPHILS # BLD AUTO: 4.11 10*3/MM3 (ref 1.7–7)
NEUTROPHILS NFR BLD AUTO: 72 % (ref 42.7–76)
NRBC BLD AUTO-RTO: 0 /100 WBC (ref 0–0.2)
PLATELET # BLD AUTO: 234 10*3/MM3 (ref 140–450)
PMV BLD AUTO: 9.6 FL (ref 6–12)
POTASSIUM BLD-SCNC: 3.6 MMOL/L (ref 3.5–5.2)
RBC # BLD AUTO: 3.94 10*6/MM3 (ref 4.14–5.8)
SODIUM BLD-SCNC: 144 MMOL/L (ref 136–145)
WBC NRBC COR # BLD: 5.71 10*3/MM3 (ref 3.4–10.8)

## 2019-10-04 PROCEDURE — 80048 BASIC METABOLIC PNL TOTAL CA: CPT | Performed by: SURGERY

## 2019-10-04 PROCEDURE — 99232 SBSQ HOSP IP/OBS MODERATE 35: CPT | Performed by: SURGERY

## 2019-10-04 PROCEDURE — 94799 UNLISTED PULMONARY SVC/PX: CPT

## 2019-10-04 PROCEDURE — 85025 COMPLETE CBC W/AUTO DIFF WBC: CPT | Performed by: SURGERY

## 2019-10-04 PROCEDURE — 99232 SBSQ HOSP IP/OBS MODERATE 35: CPT | Performed by: INTERNAL MEDICINE

## 2019-10-04 PROCEDURE — 63710000001 INSULIN DETEMIR PER 5 UNITS: Performed by: HOSPITALIST

## 2019-10-04 PROCEDURE — 25010000002 PIPERACILLIN SOD-TAZOBACTAM PER 1 G: Performed by: HOSPITALIST

## 2019-10-04 PROCEDURE — 99231 SBSQ HOSP IP/OBS SF/LOW 25: CPT | Performed by: INTERNAL MEDICINE

## 2019-10-04 PROCEDURE — 82962 GLUCOSE BLOOD TEST: CPT

## 2019-10-04 RX ADMIN — SODIUM CHLORIDE 4.5 G: 900 INJECTION, SOLUTION INTRAVENOUS at 00:37

## 2019-10-04 RX ADMIN — Medication 1 CAPSULE: at 08:37

## 2019-10-04 RX ADMIN — POTASSIUM CHLORIDE, DEXTROSE MONOHYDRATE AND SODIUM CHLORIDE 100 ML/HR: 224; 5; 450 INJECTION, SOLUTION INTRAVENOUS at 00:37

## 2019-10-04 RX ADMIN — SODIUM CHLORIDE 4.5 G: 900 INJECTION, SOLUTION INTRAVENOUS at 06:10

## 2019-10-04 RX ADMIN — SODIUM CHLORIDE 4.5 G: 900 INJECTION, SOLUTION INTRAVENOUS at 15:16

## 2019-10-04 RX ADMIN — POTASSIUM CHLORIDE, DEXTROSE MONOHYDRATE AND SODIUM CHLORIDE 50 ML/HR: 224; 5; 450 INJECTION, SOLUTION INTRAVENOUS at 15:16

## 2019-10-04 RX ADMIN — INSULIN DETEMIR 8 UNITS: 100 INJECTION, SOLUTION SUBCUTANEOUS at 20:33

## 2019-10-04 RX ADMIN — SODIUM CHLORIDE 4.5 G: 900 INJECTION, SOLUTION INTRAVENOUS at 23:37

## 2019-10-04 RX ADMIN — MELATONIN TAB 5 MG 5 MG: 5 TAB at 20:30

## 2019-10-04 NOTE — PROGRESS NOTES
"SERVICE: [facility] HOSPITALIST    CONSULTANTS:  GI  Gen Surg    CHIEF COMPLAINT: Not Eating    SUBJECTIVE:    Pt advaned to ful liquid. Pt continues to pass gas, no more diarrhea, no abdominal pain.    No other acute complaints.     Wife has multiple questions about things pt can eat when he is discharged home. She is wondering if peanut butter would be ok, explained that Nutrition and Gen Surg were better resources for those questions.    ROS negative for fever and chills    OBJECTIVE:    /62 (BP Location: Right arm, Patient Position: Sitting)   Pulse 57   Temp 98.5 °F (36.9 °C) (Oral)   Resp 20   Ht 180.3 cm (70.98\")   Wt 95.5 kg (210 lb 8.6 oz)   SpO2 97%   BMI 29.38 kg/m²     MEDS/LABS REVIEWED AND ORDERED      insulin aspart 0-7 Units Subcutaneous 4x Daily AC & at Bedtime   insulin detemir 8 Units Subcutaneous Nightly   lactobacillus acidophilus 1 capsule Oral Daily   melatonin 5 mg Oral Nightly   piperacillin-tazobactam 4.5 g Intravenous Q8H       Physical Exam   Constitutional: He is oriented to person, place, and time. No distress.   Eyes: Conjunctivae and EOM are normal. Pupils are equal, round, and reactive to light. No scleral icterus.   Neck: Normal range of motion. Neck supple. No JVD present.   Cardiovascular: Normal rate, regular rhythm and normal heart sounds.   No murmur heard.  Pulmonary/Chest: Effort normal and breath sounds normal. No stridor. No respiratory distress. He has no wheezes.   Abdominal: Soft. Bowel sounds are normal. He exhibits no distension. There is no tenderness. There is no guarding.   Musculoskeletal: He exhibits no edema.   Neurological: He is alert and oriented to person, place, and time. No cranial nerve deficit.   Skin: Skin is warm and dry. He is not diaphoretic. No erythema.   Nursing note and vitals reviewed.      LAB/DIAGNOSTICS:    Lab Results (last 24 hours)     Procedure Component Value Units Date/Time    POC Glucose Once [[id number]]  " (Abnormal) Collected:  10/04/19 1156    Specimen:  Blood Updated:  10/04/19 1213     Glucose 145 mg/dL     POC Glucose Once [[id number]]  (Normal) Collected:  10/04/19 0732    Specimen:  Blood Updated:  10/04/19 0739     Glucose 125 mg/dL     Basic Metabolic Panel [[id number]]  (Abnormal) Collected:  10/04/19 0355    Specimen:  Blood Updated:  10/04/19 0452     Glucose 122 mg/dL      BUN 6 mg/dL      Creatinine 0.71 mg/dL      Sodium 144 mmol/L      Potassium 3.6 mmol/L      Chloride 108 mmol/L      CO2 22.8 mmol/L      Calcium 8.4 mg/dL      eGFR Non African Amer 108 mL/min/1.73      BUN/Creatinine Ratio 8.5     Anion Gap 13.2 mmol/L     Narrative:       GFR Normal >60  Chronic Kidney Disease <60  Kidney Failure <15    CBC & Differential [[id number]] Collected:  10/04/19 0355    Specimen:  Blood Updated:  10/04/19 0428    Narrative:       The following orders were created for panel order CBC & Differential.  Procedure                               Abnormality         Status                     ---------                               -----------         ------                     CBC Auto Differential[[id number]]        Abnormal            Final result                 Please view results for these tests on the individual orders.    CBC Auto Differential [[id number]]  (Abnormal) Collected:  10/04/19 0355    Specimen:  Blood Updated:  10/04/19 0428     WBC 5.71 10*3/mm3      RBC 3.94 10*6/mm3      Hemoglobin 12.7 g/dL      Hematocrit 37.4 %      MCV 94.9 fL      MCH 32.2 pg      MCHC 34.0 g/dL      RDW 12.2 %      RDW-SD 42.5 fl      MPV 9.6 fL      Platelets 234 10*3/mm3      Neutrophil % 72.0 %      Lymphocyte % 18.2 %      Monocyte % 7.7 %      Eosinophil % 1.2 %      Basophil % 0.5 %      Immature Grans % 0.4 %      Neutrophils, Absolute 4.11 10*3/mm3      Lymphocytes, Absolute 1.04 10*3/mm3      Monocytes, Absolute 0.44 10*3/mm3      Eosinophils, Absolute 0.07 10*3/mm3      Basophils, Absolute 0.03 10*3/mm3       Immature Grans, Absolute 0.02 10*3/mm3      nRBC 0.0 /100 WBC     POC Glucose Once [[id number]]  (Abnormal) Collected:  10/03/19 2006    Specimen:  Blood Updated:  10/03/19 2016     Glucose 227 mg/dL     POC Glucose Once [[id number]]  (Abnormal) Collected:  10/03/19 1848    Specimen:  Blood Updated:  10/03/19 1855     Glucose 308 mg/dL         Results for orders placed in visit on 07/08/14   SCANNED - ECHOCARDIOGRAM     No radiology results for the last day      ASSESSMENT/PLAN:    Acute Sigmoid Diverticulitis (? Microperforation): Continue IV Zosyn and IVFs for now.  Surgery saw patient today and advanced to full     -Alzheimer's Dementia: Per Dr. Mac's last clinic note, his dementia continues to worsen.  He is appropriate on my exam and per his wife is at his baseline.      -Diabetes Mellitus, Type 2: HbA1c 7.2.    patient is on a low dose of basal insulin here, monitor Accu checks w/ SSI.  change back to metformin on DC     -GERD: On IV Pepcid here. No acute issues currently.     -ZENA:   Patient's CPAP machine currently with [address] for repair.     -Insomnia:  on melatonin

## 2019-10-04 NOTE — PLAN OF CARE
Problem: Patient Care Overview  Goal: Plan of Care Review  Outcome: Ongoing (interventions implemented as appropriate)   10/04/19 6170   Coping/Psychosocial   Plan of Care Reviewed With patient;spouse   OTHER   Outcome Summary VSS: ABX Tx continued; Patient ambulating in luther this shift; Family at bedside; Tentative D/C on Monday   Plan of Care Review   Progress improving       Problem: Skin Injury Risk (Adult)  Goal: Identify Related Risk Factors and Signs and Symptoms  Outcome: Ongoing (interventions implemented as appropriate)      Problem: Fall Risk (Adult)  Goal: Identify Related Risk Factors and Signs and Symptoms  Outcome: Ongoing (interventions implemented as appropriate)

## 2019-10-04 NOTE — PROGRESS NOTES
GI Daily Progress Note    Assessment/Plan:      Acute diverticulitis       LOS: 4 days     Zach Gutierrez is a 79 y.o. male who was admitted with Diverticulitis. He reports the symptoms are improving with treatment. Rresting comfortably in bed and no complaints. HGB 12.7 Normal WBC/PLT/BMP    Subjective:    Patient expresses No GI complaints  Patient denies abdominal pain, vomiting and diarrhea    Objective:    Vital signs in last 24 hours:  Temp:  [97.1 °F (36.2 °C)-97.5 °F (36.4 °C)] 97.5 °F (36.4 °C)  Heart Rate:  [53-84] 84  Resp:  [16-18] 16  BP: (111-124)/(68-78) 124/78    Intake/Output last 3 shifts:  I/O last 3 completed shifts:  In: 1530 [P.O.:480; I.V.:950; IV Piggyback:100]  Out: -   Intake/Output this shift:  I/O this shift:  In: 600 [P.O.:600]  Out: -     Physical Exam:Abdomen  Sounds Normal Active Bowel Sounds   Distension Soft   Tenderness Nontender     Imaging Results (last 72 hours)     ** No results found for the last 72 hours. **          WBC   Date Value Ref Range Status   10/04/2019 5.71 3.40 - 10.80 10*3/mm3 Final     RBC   Date Value Ref Range Status   10/04/2019 3.94 (L) 4.14 - 5.80 10*6/mm3 Final     Hemoglobin   Date Value Ref Range Status   10/04/2019 12.7 (L) 13.0 - 17.7 g/dL Final     Hematocrit   Date Value Ref Range Status   10/04/2019 37.4 (L) 37.5 - 51.0 % Final     MCV   Date Value Ref Range Status   10/04/2019 94.9 79.0 - 97.0 fL Final     MCH   Date Value Ref Range Status   10/04/2019 32.2 26.6 - 33.0 pg Final     MCHC   Date Value Ref Range Status   10/04/2019 34.0 31.5 - 35.7 g/dL Final     RDW   Date Value Ref Range Status   10/04/2019 12.2 (L) 12.3 - 15.4 % Final     RDW-SD   Date Value Ref Range Status   10/04/2019 42.5 37.0 - 54.0 fl Final     MPV   Date Value Ref Range Status   10/04/2019 9.6 6.0 - 12.0 fL Final     Platelets   Date Value Ref Range Status   10/04/2019 234 140 - 450 10*3/mm3 Final     Neutrophil %   Date Value Ref Range Status   10/04/2019 72.0 42.7 - 76.0 %  Final     Lymphocyte %   Date Value Ref Range Status   10/04/2019 18.2 (L) 19.6 - 45.3 % Final     Monocyte %   Date Value Ref Range Status   10/04/2019 7.7 5.0 - 12.0 % Final     Eosinophil %   Date Value Ref Range Status   10/04/2019 1.2 0.3 - 6.2 % Final     Basophil %   Date Value Ref Range Status   10/04/2019 0.5 0.0 - 1.5 % Final     Immature Grans %   Date Value Ref Range Status   10/04/2019 0.4 0.0 - 0.5 % Final     Neutrophils, Absolute   Date Value Ref Range Status   10/04/2019 4.11 1.70 - 7.00 10*3/mm3 Final     Lymphocytes, Absolute   Date Value Ref Range Status   10/04/2019 1.04 0.70 - 3.10 10*3/mm3 Final     Monocytes, Absolute   Date Value Ref Range Status   10/04/2019 0.44 0.10 - 0.90 10*3/mm3 Final     Eosinophils, Absolute   Date Value Ref Range Status   10/04/2019 0.07 0.00 - 0.40 10*3/mm3 Final     Basophils, Absolute   Date Value Ref Range Status   10/04/2019 0.03 0.00 - 0.20 10*3/mm3 Final     Immature Grans, Absolute   Date Value Ref Range Status   10/04/2019 0.02 0.00 - 0.05 10*3/mm3 Final     nRBC   Date Value Ref Range Status   10/04/2019 0.0 0.0 - 0.2 /100 WBC Final       Glucose   Date Value Ref Range Status   10/04/2019 122 (H) 65 - 99 mg/dL Final     Sodium   Date Value Ref Range Status   10/04/2019 144 136 - 145 mmol/L Final     Potassium   Date Value Ref Range Status   10/04/2019 3.6 3.5 - 5.2 mmol/L Final     CO2   Date Value Ref Range Status   10/04/2019 22.8 22.0 - 29.0 mmol/L Final     Chloride   Date Value Ref Range Status   10/04/2019 108 (H) 98 - 107 mmol/L Final     Anion Gap   Date Value Ref Range Status   10/04/2019 13.2 5.0 - 15.0 mmol/L Final     Creatinine   Date Value Ref Range Status   10/04/2019 0.71 (L) 0.76 - 1.27 mg/dL Final     BUN   Date Value Ref Range Status   10/04/2019 6 (L) 8 - 23 mg/dL Final     BUN/Creatinine Ratio   Date Value Ref Range Status   10/04/2019 8.5 7.0 - 25.0 Final     Calcium   Date Value Ref Range Status   10/04/2019 8.4 (L) 8.6 - 10.5 mg/dL  Final     eGFR Non  Amer   Date Value Ref Range Status   10/04/2019 108 >60 mL/min/1.73 Final     Diverticulitis-?able Microperforation  Leukocytosis  Dementia  DM II  Hiatal hernia  Diarrhea    No Bm today took one imodium yessterday, Plans per surgery, will put in F/U as OP , Dr Donald rounding this weekend but will not see unless called, thx

## 2019-10-04 NOTE — CONSULTS
Adult Nutrition  Assessment/PES    Patient Name:  Zach Gutierrez  YOB: 1940  MRN: [id number]  Admit Date:  9/30/2019    Assessment Date:  10/4/2019    Comments:  Advance to Low fiber as indicated, with long term goal high fiber once healed.  Edu provided on divirticular dz. Will cont to follow.     Reason for Assessment     Row Name 10/04/19 1427          Reason for Assessment    Reason For Assessment  identified at risk by screening criteria;nurse/nurse practitioner consult     Diagnosis  gastrointestinal disease Acute Divirticululitis hx DM , cognitive impairment wtih memory loss         Nutrition/Diet History     Row Name 10/04/19 1428          Nutrition/Diet History    Typical Food/Fluid Intake  Spoke w pt, wife, & son at bedside. NKFA. Denies issue chew/swallowing. Denies any wt loss. Wife with questions about what pt can eat at home. Pt likes oats w fresh banana/berries as well as strawberry margaritas & salads.          Anthropometrics     Row Name 10/04/19 1430          Anthropometrics    Weight  95.5 kg (210 lb 8.6 oz)        Body Mass Index (BMI)    BMI Assessment  BMI 25-29.9: overweight         Labs/Tests/Procedures/Meds     Row Name 10/04/19 1430          Labs/Procedures/Meds    Lab Results Reviewed  reviewed     Lab Results Comments  glu 125-308 H, HgA1c 7.2         Diagnostic Tests/Procedures    Diagnostic Test/Procedure Reviewed  reviewed        Medications    Pertinent Medications Reviewed  reviewed     Pertinent Medications Comments  risaquad, melatonin           Estimated/Assessed Needs     Row Name 10/04/19 1431          Estimated/Assessed Needs    Additional Documentation  Calorie Requirements (Group);Protein Requirements (Group);Fluid Requirements (Group)        Calorie Requirements    Estimated Calorie Need Method  [place]ry-[employer] [place]     Estimated Calorie Requirement Comment  2043 kcal ( mifflin 1.2 )  230 gm CHO, 45% kcal         Protein Requirements    Est Protein Requirement  Amount (gms/kg)  1.0 gm protein 96 gm pro         Fluid Requirements    Estimated Fluid Requirement Method  RDA Method 2043 ml         Nutrition Prescription Ordered     Row Name 10/04/19 1432          Nutrition Prescription PO    Current PO Diet  Full Liquid     Common Modifiers  Consistent Carbohydrate         Evaluation of Received Nutrient/Fluid Intake     Row Name 10/04/19 1432          Fluid Intake Evaluation    Oral Fluid (mL)  [address] x 2, 47%        PO Evaluation    Number of Meals  1     % PO Intake  50               Problem/Interventions:  Problem 1     Row Name 10/04/19 1433          Nutrition Diagnoses Problem 1    Problem 1  Altered GI Function     Etiology (related to)  Medical Diagnosis     Gastrointestinal  Diverticulitis     Signs/Symptoms (evidenced by)  Report/Observation                 Intervention Goal     Row Name 10/04/19 1433          Intervention Goal    PO  Establish PO;PO intake (%)     PO Intake %  50 % or greater     Weight  No significant weight loss         Nutrition Intervention     Row Name 10/04/19 1433          Nutrition Intervention    RD/Tech Action  Interview for preference;Encourage intake;Follow Tx progress           Education/Evaluation     Row Name 10/04/19 1433          Education    Education  Education topics Edu on osis vs itis. High fiber long term goal, usually low fiber during acute/healing itis. Nuts/seeds/corn no longer restricted unless pt knows reacts to them. During low fiber refined grains ( not whole), cooked veggies ( limite gas producing).     Education Topics  Fiber;GI disease Avoid beans during itis, banana/melon ok b/c no nafisa/peel/seeds. Transition to high fiber long term goal once healed. Provided NIH DIvirticular Dz, Low Fiber, High Fiber along w RD card.         Monitor/Evaluation    Monitor  Per protocol;I&O;PO intake;Pertinent labs;Weight;Symptoms     Education Follow-up  Other (comment) Pt wife verbalized understanding. Expect compliance.             Electronically signed by:  Vernell Girard RD  10/04/19 2:39 PM

## 2019-10-04 NOTE — SIGNIFICANT NOTE
Called wife about location of mask for home CPAP. She had left at home on table. Will bring it with her in AM. Asked if he was asleep yet and let her know he was not yet. Nurse advised.

## 2019-10-04 NOTE — PLAN OF CARE
Problem: Patient Care Overview  Goal: Plan of Care Review  Outcome: Ongoing (interventions implemented as appropriate)   10/04/19 2731   Coping/Psychosocial   Plan of Care Reviewed With patient   OTHER   Outcome Summary Consult for edu, completed. Pt trying fulls at lunch today. Edu wife at bedside. Will cont to follow.

## 2019-10-04 NOTE — PLAN OF CARE
Problem: Patient Care Overview  Goal: Plan of Care Review  Outcome: Ongoing (interventions implemented as appropriate)      Problem: Skin Injury Risk (Adult)  Goal: Skin Health and Integrity  Outcome: Ongoing (interventions implemented as appropriate)   10/04/19 0409   Skin Injury Risk (Adult)   Skin Health and Integrity making progress toward outcome       Problem: Fall Risk (Adult)  Goal: Absence of Fall  Outcome: Ongoing (interventions implemented as appropriate)   10/04/19 0409   Fall Risk (Adult)   Absence of Fall making progress toward outcome

## 2019-10-04 NOTE — PROGRESS NOTES
I am following him up for his diverticular disease.  He is afebrile vital signs are stable he says he feels well he is tolerating clear liquids his abdomen is soft and nontender today his lungs are clear and equal.  His white blood count is 5.71 and is normal.  We will increase his diet to full liquids continue intravenous antibiotics we will decrease his IV rate as well.

## 2019-10-05 LAB
GLUCOSE BLDC GLUCOMTR-MCNC: 151 MG/DL (ref 70–130)
GLUCOSE BLDC GLUCOMTR-MCNC: 155 MG/DL (ref 70–130)
GLUCOSE BLDC GLUCOMTR-MCNC: 174 MG/DL (ref 70–130)
GLUCOSE BLDC GLUCOMTR-MCNC: 98 MG/DL (ref 70–130)

## 2019-10-05 PROCEDURE — 94799 UNLISTED PULMONARY SVC/PX: CPT

## 2019-10-05 PROCEDURE — 25010000002 PIPERACILLIN SOD-TAZOBACTAM PER 1 G: Performed by: HOSPITALIST

## 2019-10-05 PROCEDURE — 99231 SBSQ HOSP IP/OBS SF/LOW 25: CPT | Performed by: SURGERY

## 2019-10-05 PROCEDURE — 63710000001 INSULIN DETEMIR PER 5 UNITS: Performed by: HOSPITALIST

## 2019-10-05 PROCEDURE — 63710000001 INSULIN ASPART PER 5 UNITS: Performed by: HOSPITALIST

## 2019-10-05 PROCEDURE — 82962 GLUCOSE BLOOD TEST: CPT

## 2019-10-05 PROCEDURE — 99232 SBSQ HOSP IP/OBS MODERATE 35: CPT | Performed by: INTERNAL MEDICINE

## 2019-10-05 RX ORDER — AMOXICILLIN AND CLAVULANATE POTASSIUM 875; 125 MG/1; MG/1
1 TABLET, FILM COATED ORAL EVERY 12 HOURS SCHEDULED
Status: DISCONTINUED | OUTPATIENT
Start: 2019-10-05 | End: 2019-10-06 | Stop reason: HOSPADM

## 2019-10-05 RX ADMIN — SERTRALINE 50 MG: 50 TABLET, FILM COATED ORAL at 08:00

## 2019-10-05 RX ADMIN — AMOXICILLIN AND CLAVULANATE POTASSIUM 1 TABLET: 875; 125 TABLET, FILM COATED ORAL at 15:09

## 2019-10-05 RX ADMIN — INSULIN ASPART 2 UNITS: 100 INJECTION, SOLUTION INTRAVENOUS; SUBCUTANEOUS at 20:21

## 2019-10-05 RX ADMIN — INSULIN ASPART 2 UNITS: 100 INJECTION, SOLUTION INTRAVENOUS; SUBCUTANEOUS at 12:05

## 2019-10-05 RX ADMIN — INSULIN DETEMIR 8 UNITS: 100 INJECTION, SOLUTION SUBCUTANEOUS at 20:21

## 2019-10-05 RX ADMIN — Medication 1 CAPSULE: at 08:00

## 2019-10-05 RX ADMIN — MELATONIN TAB 5 MG 5 MG: 5 TAB at 20:21

## 2019-10-05 RX ADMIN — AMOXICILLIN AND CLAVULANATE POTASSIUM 1 TABLET: 875; 125 TABLET, FILM COATED ORAL at 20:20

## 2019-10-05 RX ADMIN — INSULIN ASPART 2 UNITS: 100 INJECTION, SOLUTION INTRAVENOUS; SUBCUTANEOUS at 17:45

## 2019-10-05 RX ADMIN — SODIUM CHLORIDE 4.5 G: 900 INJECTION, SOLUTION INTRAVENOUS at 06:14

## 2019-10-05 NOTE — PROGRESS NOTES
Daily Progress Note    Chief Complaint: following Diverticulitis     Subjective     Pt tolerating full liquids, pain is improved     Objective     Vital signs in last 24 hours:  Temp:  [97.6 °F (36.4 °C)-98.5 °F (36.9 °C)] 97.6 °F (36.4 °C)  Heart Rate:  [57-72] 72  Resp:  [18-20] 20  BP: ()/(62-69) 124/69    Intake/Output last 3 shifts:  I/O last 3 completed shifts:  In: 1780 [P.O.:1680; IV Piggyback:100]  Out: 625 [Urine:625]    Physical Exam:  Respiratory: CTA, normal inspiratory effort  CV: RRR, no JVD  Abd: Positive BS, soft, ND, NT, no rebound, no guarding  Ext:  No cyanosis, no edema    Assessment/Plan   Diverticulitis resolving - adv diet and change IV Abx to oral Augmentin   D/c IVFs   Possible d/c in am     Anabella Dominguez MD  General, Minimally Invasive and Endoscopic Surgery  [facility] [place], operated by [facility]    [address] [facility] [address] [place] [address]   [address]    [address]  [place], [place] [id number] [place] [id number]    P: [phone]  F: [phone]    Cc:  Jessica Mac MD

## 2019-10-05 NOTE — PLAN OF CARE
Problem: Patient Care Overview  Goal: Plan of Care Review  Outcome: Ongoing (interventions implemented as appropriate)   10/05/19 6668   Coping/Psychosocial   Plan of Care Reviewed With patient;spouse;caregiver   OTHER   Outcome Summary pt vss. pt resting in chair with bed alarm and family at bedside. pt with hx of alzheimer's - pt alert to self and place 'hospital'. pt iv fluids discontinued and antibiotics changed to po. diet advanced to low fiber, low residue. pt is pleasant and cooperative.    Plan of Care Review   Progress improving       Problem: Skin Injury Risk (Adult)  Goal: Skin Health and Integrity  Outcome: Ongoing (interventions implemented as appropriate)      Problem: Fall Risk (Adult)  Goal: Absence of Fall  Outcome: Ongoing (interventions implemented as appropriate)      Problem: Infection, Risk/Actual (Adult)  Goal: Infection Prevention/Resolution  Outcome: Ongoing (interventions implemented as appropriate)

## 2019-10-05 NOTE — PROGRESS NOTES
"SERVICE: [facility] HOSPITALIST    CHIEF COMPLAINT: DM    SUBJECTIVE:    On low residue diet. Tolerating well when seen in late afternoon. Though no family at bedside, and pt's memory is imparied.     Denies fever and chills, Passing gas, wanting to go to bathroom for BM when examined.    OBJECTIVE:    /71 (BP Location: Right arm, Patient Position: Sitting)   Pulse 56   Temp 97.4 °F (36.3 °C) (Oral)   Resp 20   Ht 180.3 cm (70.98\")   Wt 95.5 kg (210 lb 8.6 oz)   SpO2 97%   BMI 29.38 kg/m²     MEDS/LABS REVIEWED AND ORDERED      amoxicillin-clavulanate 1 tablet Oral Q12H   insulin aspart 0-7 Units Subcutaneous 4x Daily AC & at Bedtime   insulin detemir 8 Units Subcutaneous Nightly   lactobacillus acidophilus 1 capsule Oral Daily   melatonin 5 mg Oral Nightly   sertraline 50 mg Oral Daily       Physical Exam   Constitutional: No distress.   Eyes: Pupils are equal, round, and reactive to light.   Cardiovascular: Normal rate, regular rhythm and normal heart sounds.   No murmur heard.  Pulmonary/Chest: Effort normal and breath sounds normal. No stridor. No respiratory distress. He has no wheezes. He has no rales.   Abdominal: Soft. Bowel sounds are normal. He exhibits no distension. There is no tenderness. There is no guarding.   Musculoskeletal: He exhibits no edema.   Neurological: He is alert. No cranial nerve deficit.   Skin: Skin is warm and dry. He is not diaphoretic.   Psychiatric: He has a normal mood and affect. His behavior is normal.   Nursing note and vitals reviewed.      LAB/DIAGNOSTICS:    Lab Results (last 24 hours)     Procedure Component Value Units Date/Time    POC Glucose Once [[id number]]  (Abnormal) Collected:  10/05/19 1632    Specimen:  Blood Updated:  10/05/19 1638     Glucose 155 mg/dL     POC Glucose Once [[id number]]  (Abnormal) Collected:  10/05/19 1135    Specimen:  Blood Updated:  10/05/19 1141     Glucose 151 mg/dL     POC Glucose Once [[id number]]  (Normal) " Collected:  10/05/19 0721    Specimen:  Blood Updated:  10/05/19 0727     Glucose 98 mg/dL     POC Glucose Once [[id number]]  (Abnormal) Collected:  10/04/19 2026    Specimen:  Blood Updated:  10/04/19 2032     Glucose 142 mg/dL         Results for orders placed in visit on 07/08/14   SCANNED - ECHOCARDIOGRAM     No radiology results for the last day      ASSESSMENT/PLAN:    Acute Sigmoid Diverticulitis (? Microperforation): switch zosyn to augmentin.  Surgery saw patient today and advanced to GI soft, plan for DC in am     -Alzheimer's Dementia: Per Dr. Mac's last clinic note, his dementia continues to worsen.  He is appropriate on my exam and similar to yesterday        -Diabetes Mellitus, Type 2: HbA1c 7.2.    patient is on a low dose of basal insulin here, monitor Accu checks w/ SSI.  change back to metformin on DC     -GERD: On IV Pepcid here. No acute issues currently.     -ZENA:   Patient's CPAP machine currently with [employer] for repair.     -Insomnia:  on melatonin

## 2019-10-05 NOTE — PLAN OF CARE
Problem: Patient Care Overview  Goal: Plan of Care Review   10/05/19 0426   Coping/Psychosocial   Plan of Care Reviewed With patient   OTHER   Outcome Summary VSS, ambulated around the unit with assistance, abt continues   Plan of Care Review   Progress improving     Goal: Individualization and Mutuality  Outcome: Ongoing (interventions implemented as appropriate)   10/05/19 0426   Individualization   Patient Specific Preferences Prefers to go back [place]       Problem: Skin Injury Risk (Adult)  Goal: Identify Related Risk Factors and Signs and Symptoms  Outcome: Outcome(s) achieved Date Met: 10/05/19   10/05/19 0426   Skin Injury Risk (Adult)   Related Risk Factors (Skin Injury Risk) cognitive impairment     Goal: Skin Health and Integrity  Outcome: Ongoing (interventions implemented as appropriate)   10/05/19 0426   Skin Injury Risk (Adult)   Skin Health and Integrity making progress toward outcome       Problem: Fall Risk (Adult)  Goal: Identify Related Risk Factors and Signs and Symptoms  Outcome: Outcome(s) achieved Date Met: 10/05/19   10/05/19 0426   Fall Risk (Adult)   Related Risk Factors (Fall Risk) age-related changes;environment unfamiliar;confusion/agitation   Signs and Symptoms (Fall Risk) presence of risk factors     Goal: Absence of Fall  Outcome: Ongoing (interventions implemented as appropriate)   10/05/19 0426   Fall Risk (Adult)   Absence of Fall making progress toward outcome

## 2019-10-06 VITALS
BODY MASS INDEX: 29.48 KG/M2 | TEMPERATURE: 97 F | SYSTOLIC BLOOD PRESSURE: 138 MMHG | DIASTOLIC BLOOD PRESSURE: 72 MMHG | OXYGEN SATURATION: 98 % | WEIGHT: 210.54 LBS | RESPIRATION RATE: 20 BRPM | HEART RATE: 60 BPM | HEIGHT: 71 IN

## 2019-10-06 LAB
GLUCOSE BLDC GLUCOMTR-MCNC: 195 MG/DL (ref 70–130)
GLUCOSE BLDC GLUCOMTR-MCNC: 93 MG/DL (ref 70–130)

## 2019-10-06 PROCEDURE — 99231 SBSQ HOSP IP/OBS SF/LOW 25: CPT | Performed by: SURGERY

## 2019-10-06 PROCEDURE — 99238 HOSP IP/OBS DSCHRG MGMT 30/<: CPT | Performed by: INTERNAL MEDICINE

## 2019-10-06 PROCEDURE — 82962 GLUCOSE BLOOD TEST: CPT

## 2019-10-06 PROCEDURE — 63710000001 INSULIN ASPART PER 5 UNITS: Performed by: HOSPITALIST

## 2019-10-06 RX ORDER — L.ACID,PARA/B.BIFIDUM/S.THERM 8B CELL
1 CAPSULE ORAL DAILY
Start: 2019-10-07 | End: 2019-10-09 | Stop reason: ALTCHOICE

## 2019-10-06 RX ORDER — AMOXICILLIN AND CLAVULANATE POTASSIUM 875; 125 MG/1; MG/1
1 TABLET, FILM COATED ORAL EVERY 12 HOURS SCHEDULED
Qty: 17 TABLET | Refills: 0 | Status: SHIPPED | OUTPATIENT
Start: 2019-10-06 | End: 2019-10-15

## 2019-10-06 RX ADMIN — INSULIN ASPART 2 UNITS: 100 INJECTION, SOLUTION INTRAVENOUS; SUBCUTANEOUS at 12:21

## 2019-10-06 RX ADMIN — Medication 1 CAPSULE: at 09:31

## 2019-10-06 RX ADMIN — SERTRALINE 50 MG: 50 TABLET, FILM COATED ORAL at 09:31

## 2019-10-06 RX ADMIN — AMOXICILLIN AND CLAVULANATE POTASSIUM 1 TABLET: 875; 125 TABLET, FILM COATED ORAL at 09:31

## 2019-10-06 NOTE — DISCHARGE SUMMARY
"  Zach Gutierrez  1940  [id number]      Hospitalists Discharge Summary    Date of Admission: 9/30/2019  Date of Discharge:  10/6/2019    Primary Discharge Diagnoses: Acute Sigmoid Diverticulitis (? Microperforation):     Secondary Discharge Diagnoses:   Diabetes Mellitus, Type 2 not on chronic insulin  Alzheimer's Dementia  ZENA  GERD  Insomnia    PCP  Patient Care Team:  Jessica Mac MD as PCP - General  Jessica Mac MD as PCP - Family Medicine  Jessica Mac MD as PCP - Claims Attributed    Consults:   Consults     Date and Time Order Name Status Description    10/1/2019 0704 Inpatient Gastroenterology Consult Completed     9/30/2019 7183 Inpatient General Surgery Consult Completed           CC:  Abdominal Pain    History of Present Illness:  As per H&P: \"Mr. Gutierrez is a pleasant, 80 y/o  male who presents this evening due to lower quadrant abdominal pain and diarrhea although he hasn't had diarrhea since arriving in the ER.  He is not a good historian and his wife has gone home for the evening.  The HPI is taken from review of the records.  His symptoms began a couple of days ago.  No reported sick contacts.  Initially, his wife felt as though the diarrhea was coming from his Metformin, and his pills were cut in half, and that seemed to helped.  When asked about his pain, he points to his lower abdomen and describes it as \"sore\".  It does not appear that his abdominal pain was relieved by bowel movements.  The diarrhea was non-bloody.  No reported nausea or vomiting.  No reported fever or chills.  He has continued to tolerate his pills.  Mr. Gutierrez reports his blood glucose runs \"high\".\"    Hospital Course     Pt improved with conservative management. His diet was slowly advanced once his exam improved. And pt tolerated a gi soft diet prior to discharge.     Physical Exam at Discharge  Vital Signs  Temp:  [97 °F (36.1 °C)-97.6 °F (36.4 °C)] 97 °F (36.1 °C)  Heart Rate:  [56-60] 60  Resp:  " [18-20] 20  BP: (127-144)/(71-90) 138/72    Physical Exam:  Physical Exam   Constitutional: No distress.   Eyes: Conjunctivae and EOM are normal. Pupils are equal, round, and reactive to light.   Cardiovascular: Normal rate, regular rhythm and normal heart sounds. Exam reveals no friction rub.   No murmur heard.  Pulmonary/Chest: Effort normal and breath sounds normal. No stridor. No respiratory distress. He has no wheezes. He has no rales.   Abdominal: Soft. Bowel sounds are normal. He exhibits no distension and no mass. There is no tenderness. There is no guarding.   Musculoskeletal: He exhibits no edema.   Neurological: He is alert. No cranial nerve deficit.   Skin: Skin is warm and dry. He is not diaphoretic. No erythema.   Psychiatric: He has a normal mood and affect. His behavior is normal.   Nursing note and vitals reviewed.      Operations and Procedures Performed:        Allergies:  has No Known Allergies.    Jos  reviewed    Discharge Medications:     Discharge Medications      New Medications      Instructions Start Date   amoxicillin-clavulanate 875-125 MG per tablet  Commonly known as:  AUGMENTIN   1 tablet, Oral, Every 12 Hours Scheduled         Changes to Medications      Instructions Start Date   sertraline 50 MG tablet  Commonly known as:  ZOLOFT  What changed:  Another medication with the same name was removed. Continue taking this medication, and follow the directions you see here.   50 mg, Oral, Daily         Continue These Medications      Instructions Start Date   aspirin 81 MG tablet   81 mg, Oral, Daily      donepezil 10 MG tablet  Commonly known as:  ARICEPT   10 mg, Oral, Nightly      fexofenadine 180 MG tablet  Commonly known as:  ALLEGRA   180 mg, Oral, Daily      finasteride 5 MG tablet  Commonly known as:  PROSCAR   5 mg, Oral, Nightly      metFORMIN 500 MG tablet  Commonly known as:  GLUCOPHAGE   TAKE 2 TABLETS TWICE A DAY      multivitamin tablet   1 tablet, Oral, Daily       NAMENDA XR 28 MG capsule sustained-release 24 hr extended release capsule  Generic drug:  memantine   28 mg, Oral, Nightly      RAPAFLO 8 MG capsule capsule  Generic drug:  silodosin   8 mg, Daily With Breakfast         Stop These Medications    cephalexin 500 MG capsule  Commonly known as:  KEFLEX     ILEVRO 0.3 % suspension  Generic drug:  Nepafenac     moxifloxacin 0.5 % ophthalmic solution  Commonly known as:  VIGAMOX     prednisoLONE acetate 1 % ophthalmic suspension  Commonly known as:  PRED FORTE            Last Lab Results:   Lab Results (last 72 hours)     Procedure Component Value Units Date/Time    POC Glucose Once [[id number]]  (Normal) Collected:  10/06/19 0706    Specimen:  Blood Updated:  10/06/19 0713     Glucose 93 mg/dL     POC Glucose Once [[id number]]  (Abnormal) Collected:  10/05/19 1918    Specimen:  Blood Updated:  10/05/19 1925     Glucose 174 mg/dL     POC Glucose Once [[id number]]  (Abnormal) Collected:  10/05/19 1632    Specimen:  Blood Updated:  10/05/19 1638     Glucose 155 mg/dL     POC Glucose Once [[id number]]  (Abnormal) Collected:  10/05/19 1135    Specimen:  Blood Updated:  10/05/19 1141     Glucose 151 mg/dL     POC Glucose Once [[id number]]  (Normal) Collected:  10/05/19 0721    Specimen:  Blood Updated:  10/05/19 0727     Glucose 98 mg/dL     POC Glucose Once [[id number]]  (Abnormal) Collected:  10/04/19 2026    Specimen:  Blood Updated:  10/04/19 2032     Glucose 142 mg/dL     POC Glucose Once [[id number]]  (Normal) Collected:  10/04/19 1655    Specimen:  Blood Updated:  10/04/19 1703     Glucose 122 mg/dL     POC Glucose Once [[id number]]  (Abnormal) Collected:  10/04/19 1156    Specimen:  Blood Updated:  10/04/19 1213     Glucose 145 mg/dL     POC Glucose Once [[id number]]  (Normal) Collected:  10/04/19 0732    Specimen:  Blood Updated:  10/04/19 0739     Glucose 125 mg/dL     Basic Metabolic Panel [[id number]]  (Abnormal) Collected:  10/04/19 0355    Specimen:  Blood Updated:   10/04/19 0452     Glucose 122 mg/dL      BUN 6 mg/dL      Creatinine 0.71 mg/dL      Sodium 144 mmol/L      Potassium 3.6 mmol/L      Chloride 108 mmol/L      CO2 22.8 mmol/L      Calcium 8.4 mg/dL      eGFR Non African Amer 108 mL/min/1.73      BUN/Creatinine Ratio 8.5     Anion Gap 13.2 mmol/L     Narrative:       GFR Normal >60  Chronic Kidney Disease <60  Kidney Failure <15    CBC & Differential [[id number]] Collected:  10/04/19 0355    Specimen:  Blood Updated:  10/04/19 0428    Narrative:       The following orders were created for panel order CBC & Differential.  Procedure                               Abnormality         Status                     ---------                               -----------         ------                     CBC Auto Differential[[id number]]        Abnormal            Final result                 Please view results for these tests on the individual orders.    CBC Auto Differential [[id number]]  (Abnormal) Collected:  10/04/19 0355    Specimen:  Blood Updated:  10/04/19 0428     WBC 5.71 10*3/mm3      RBC 3.94 10*6/mm3      Hemoglobin 12.7 g/dL      Hematocrit 37.4 %      MCV 94.9 fL      MCH 32.2 pg      MCHC 34.0 g/dL      RDW 12.2 %      RDW-SD 42.5 fl      MPV 9.6 fL      Platelets 234 10*3/mm3      Neutrophil % 72.0 %      Lymphocyte % 18.2 %      Monocyte % 7.7 %      Eosinophil % 1.2 %      Basophil % 0.5 %      Immature Grans % 0.4 %      Neutrophils, Absolute 4.11 10*3/mm3      Lymphocytes, Absolute 1.04 10*3/mm3      Monocytes, Absolute 0.44 10*3/mm3      Eosinophils, Absolute 0.07 10*3/mm3      Basophils, Absolute 0.03 10*3/mm3      Immature Grans, Absolute 0.02 10*3/mm3      nRBC 0.0 /100 WBC     POC Glucose Once [[id number]]  (Abnormal) Collected:  10/03/19 2006    Specimen:  Blood Updated:  10/03/19 2016     Glucose 227 mg/dL     POC Glucose Once [[id number]]  (Abnormal) Collected:  10/03/19 1848    Specimen:  Blood Updated:  10/03/19 1855     Glucose 308 mg/dL     POC  Glucose Once [[id number]]  (Abnormal) Collected:  10/03/19 1215    Specimen:  Blood Updated:  10/03/19 1228     Glucose 131 mg/dL         Ct Abdomen Pelvis Without Contrast    Result Date: 9/30/2019  Narrative: CT Abdomen Pelvis WO INDICATION: Bilateral flank pain since yesterday. TECHNIQUE: CT of the abdomen and pelvis without IV contrast. Coronal and sagittal reconstructions were obtained.  Radiation dose reduction techniques included automated exposure control or exposure modulation based on body size. Count of known CT and cardiac nuc med studies performed in previous 12 months: 0. COMPARISON: 7/21/2008 FINDINGS: Abdomen: Lung bases are clear. There is a moderate-sized hiatal hernia. The liver, gallbladder, spleen, pancreas, adrenal glands and left kidney normal. The right kidney has a 4 cm cyst is otherwise normal. Aorta is normal in size. There is no adenopathy. There are numerous sigmoid diverticuli and there are inflammatory changes around the mid sigmoid colon. There may be one bubble of free air but this could also be a thin-walled diverticulum. There is no abscess visible. Rest the bowel is normal. Pelvis: Bladder and prostate gland are normal. Bones are unremarkable.     Impression: Findings are consistent with diverticulitis. There are inflammatory changes around the mid sigmoid colon. There is a 1 cm bubble of air associated with the inflammatory change. Its difficult to tell if this represents air in a thin walled diverticulum or small collection of extraluminal air. There is no abscess formation. There is no free air identified elsewhere in the abdomen or pelvis. Otherwise study is negative Signer Name: Celso Mayberry MD  Signed: 9/30/2019 10:09 PM  Workstation Name: [facility]  [facility]      Condition on Discharge:  Good    Discharge Disposition  To home    Visiting Nurse:    No     Home PT/OT:  No     Home Safety Evaluation:  No     DME  None    Discharge Diet:      Dietary  Orders (From admission, onward)    Start     Ordered    10/05/19 1238  Diet Regular; Consistent Carbohydrate, Low Fiber / Low Residue  Diet Effective Now     Question Answer Comment   Diet Texture / Consistency Regular    Common Modifiers Consistent Carbohydrate    Common Modifiers Low Fiber / Low Residue        10/05/19 1237          Activity at Discharge:  activity as tolerated      Pre-discharge education      Follow-up Appointments  Future Appointments   Date Time Provider Department Center   12/2/2019  8:30 AM Jessica Mac MD [facility]   3/11/2020  8:00 AM Jessica Mac MD [facility]     Additional Instructions for the Follow-ups that You Need to Schedule     Discharge Follow-up with PCP   As directed       Currently Documented PCP:    Jessica Mac MD    PCP Phone Number:    [phone]     Follow Up Details:  2 weeks         Discharge Follow-up with Specified Provider: GI; 1 Month   As directed      To:  GI    Follow Up:  1 Month               Test Results Pending at Discharge       Nan George MD  10/06/19  11:23 AM    Time: Discharge < 30 min (if over 30 minutes give explanation as to why it took greater than 30 minutes)

## 2019-10-06 NOTE — PLAN OF CARE
Problem: Patient Care Overview  Goal: Plan of Care Review  Outcome: Ongoing (interventions implemented as appropriate)   10/06/19 0434   Coping/Psychosocial   Plan of Care Reviewed With patient;spouse   OTHER   Outcome Summary pt hx of alzheimers so chair and bed alarms utilized - VSS - pt doesn't want to use walker, thinks it is his wifes idea to use - will continue to monitor   Plan of Care Review   Progress improving     Goal: Individualization and Mutuality  Outcome: Ongoing (interventions implemented as appropriate)      Problem: Skin Injury Risk (Adult)  Goal: Skin Health and Integrity  Outcome: Ongoing (interventions implemented as appropriate)      Problem: Fall Risk (Adult)  Goal: Absence of Fall  Outcome: Ongoing (interventions implemented as appropriate)      Problem: Infection, Risk/Actual (Adult)  Goal: Identify Related Risk Factors and Signs and Symptoms  Outcome: Ongoing (interventions implemented as appropriate)    Goal: Infection Prevention/Resolution  Outcome: Ongoing (interventions implemented as appropriate)

## 2019-10-06 NOTE — PROGRESS NOTES
Daily Progress Note    Chief Complaint: following diverticulitis     Subjective     Pt sleeping, wife at beside and reports patient is tolerating reg diet and is not c/o of abdominal pain     Objective     Vital signs in last 24 hours:  Temp:  [97 °F (36.1 °C)-97.6 °F (36.4 °C)] 97 °F (36.1 °C)  Heart Rate:  [56-60] 60  Resp:  [18-20] 20  BP: (127-144)/(71-90) 138/72    Intake/Output last 3 shifts:  I/O last 3 completed shifts:  In: 1560 [P.O.:960; I.V.:500; IV Piggyback:100]  Out: 625 [Urine:625]    Physical Exam:  Respiratory: normal inspiratory effort  Abd: Positive BS, soft, ND, NT, no rebound, no guarding  Ext:  No cyanosis, no edema    Assessment/Plan   Diverticulitis - complete oral antibiotics, Augmentin  F/u as needed       Anabella Dominguez MD  General, Minimally Invasive and Endoscopic Surgery  [facility]    [address] [facility] [address] [place] [address]   [address]    [address]  [place] [address]               [place] [address]    P: [phone]  F: [phone]    Cc:  Jessica Mac MD

## 2019-10-06 NOTE — NURSING NOTE
Case Management Discharge Note    Final Note: d/c home               Final Discharge Disposition Code: 01 - home or self-care

## 2019-10-07 ENCOUNTER — READMISSION MANAGEMENT (OUTPATIENT)
Dept: CALL CENTER | Facility: HOSPITAL | Age: 79
End: 2019-10-07

## 2019-10-07 NOTE — OUTREACH NOTE
Prep Survey      Responses   Facility patient discharged from?  [facility]   Is LACE score < 7 ?  No   Is patient eligible?  Yes   Discharge diagnosis  diverticulitis   Does the patient have one of the following disease processes/diagnoses(primary or secondary)?  Other   Does the patient have Home health ordered?  No   Is there a DME ordered?  No   Prep survey completed?  Yes          Mine Escoto RN

## 2019-10-09 ENCOUNTER — OFFICE VISIT (OUTPATIENT)
Dept: FAMILY MEDICINE CLINIC | Facility: CLINIC | Age: 79
End: 2019-10-09

## 2019-10-09 ENCOUNTER — READMISSION MANAGEMENT (OUTPATIENT)
Dept: CALL CENTER | Facility: HOSPITAL | Age: 79
End: 2019-10-09

## 2019-10-09 VITALS
HEART RATE: 61 BPM | BODY MASS INDEX: 29.62 KG/M2 | SYSTOLIC BLOOD PRESSURE: 112 MMHG | OXYGEN SATURATION: 98 % | HEIGHT: 71 IN | DIASTOLIC BLOOD PRESSURE: 66 MMHG | TEMPERATURE: 98.4 F | WEIGHT: 211.6 LBS

## 2019-10-09 DIAGNOSIS — F32.A DEPRESSION, UNSPECIFIED DEPRESSION TYPE: ICD-10-CM

## 2019-10-09 DIAGNOSIS — E11.9 TYPE 2 DIABETES MELLITUS WITHOUT COMPLICATION, WITHOUT LONG-TERM CURRENT USE OF INSULIN (HCC): ICD-10-CM

## 2019-10-09 DIAGNOSIS — K57.92 ACUTE DIVERTICULITIS: Primary | ICD-10-CM

## 2019-10-09 PROCEDURE — 99213 OFFICE O/P EST LOW 20 MIN: CPT | Performed by: INTERNAL MEDICINE

## 2019-10-09 NOTE — PROGRESS NOTES
AMANDA@  Zach SID Gutierrez is a 79 y.o. male.     Chief Complaint   Patient presents with   • hospital follow up     danny stern diverticulitis       History of Present Illness   Patient here for follow-up on acute diverticulitis.  On the GI soft diet.  No fever or chills.  Recovering well.  On Augmentin.  No melena, hematochezia, regular bowel movements.  No nausea or vomiting.  Abdominal pain much better.  Per wife patient getting more irritated.  Wants his Zoloft increased.  The following portions of the patient's history were reviewed and updated as appropriate: allergies, current medications, past family history, past medical history, past social history, past surgical history and problem list.    Review of Systems   Constitutional: Negative for activity change, appetite change, fatigue and fever.   Eyes: Negative for blurred vision and double vision.   Respiratory: Negative.  Negative for shortness of breath.    Cardiovascular: Negative for chest pain, palpitations and leg swelling.   Gastrointestinal: Negative.    Neurological: Negative for dizziness, syncope, light-headedness and headache.   Psychiatric/Behavioral: Positive for behavioral problems. Negative for decreased concentration, dysphoric mood and depressed mood.       No Known Allergies    Current Outpatient Medications on File Prior to Visit   Medication Sig Dispense Refill   • amoxicillin-clavulanate (AUGMENTIN) 875-125 MG per tablet Take 1 tablet by mouth Every 12 (Twelve) Hours for 17 doses. 17 tablet 0   • donepezil (ARICEPT) 10 MG tablet Take 10 mg by mouth Every Night.     • fexofenadine (ALLEGRA) 180 MG tablet Take 180 mg by mouth Daily.     • finasteride (PROSCAR) 5 MG tablet Take 5 mg by mouth Every Night.     • memantine (NAMENDA XR) 28 MG capsule sustained-release 24 hr extended release capsule Take 28 mg by mouth Every Night.     • metFORMIN (GLUCOPHAGE) 500 MG tablet TAKE 2 TABLETS TWICE A  tablet 2   • Multiple  Vitamin (MULTIVITAMIN) tablet Take 1 tablet by mouth Daily.     • Probiotic Product (PROBIOTIC DAILY PO) Take  by mouth.     • sertraline (ZOLOFT) 50 MG tablet Take 50 mg by mouth Daily.     • [DISCONTINUED] aspirin 81 MG tablet Take 81 mg by mouth Daily.     • [DISCONTINUED] lactobacillus acidophilus (RISAQUAD) capsule capsule Take 1 capsule by mouth Daily.     • [DISCONTINUED] RAPAFLO 8 MG capsule capsule 8 mg Daily With Breakfast.       No current facility-administered medications on file prior to visit.        History reviewed. No pertinent family history.    Past Medical History:   Diagnosis Date   • Arthritis     BACK PAIN   • Dementia (CMS/HCC)    • Diabetes (CMS/HCC)     TYPE 2   • Knee sprain    • ZENA (obstructive sleep apnea)     cpap       Past Surgical History:   Procedure Laterality Date   • COLONOSCOPY N/A 5/19/2017    Procedure: COLONOSCOPY;  Surgeon: Srikanth Payan MD;  Location: [facility];  Service:    • CYSTOSCOPY TRANSURETHRAL RESECTION OF PROSTATE N/A 10/16/2017    Procedure: CYSTOSCOPY TRANSURETHRAL RESECTION OF PROSTATE;  Surgeon: Basil Yang MD;  Location: [facility] OR;  Service:    • HERNIA REPAIR Bilateral     inguinal   • [place] TOTAL KNEE ARTHROPLASTY Right 6/20/2017    Procedure: TOTAL KNEE ARTHROPLASTY hima orthoalign antibiotic cement 7fr hemovac placement;  Surgeon: Bj Thrasher MD;  Location: [facility];  Service: Orthopedics   • SHOULDER SURGERY Right 2007    RCR       Social History     Socioeconomic History   • Marital status:      Spouse name: Not on file   • Number of children: Not on file   • Years of education: Not on file   • Highest education level: Not on file   Tobacco Use   • Smoking status: Never Smoker   • Smokeless tobacco: Never Used   Substance and Sexual Activity   • Alcohol use: Yes     Comment: one jim/week   • Drug use: No   • Sexual activity: Defer       Patient Active Problem List   Diagnosis   • Osteoarthritis of right knee   • Status  post total right knee replacement   • BPH with obstruction/lower urinary tract symptoms   • Obstructive sleep apnea, adult   • Gastroesophageal reflux disease without esophagitis   • Type 2 diabetes mellitus without complication, without long-term current use of insulin (CMS/[facility])   • Acquired spondylolysis   • Dyslipidemia   • Extremity pain   • Lumbar canal stenosis   • Hematuria   • Mild cognitive impairment with memory loss   • Sleep apnea   • Alzheimer's disease (CMS/[facility])   • Acute diverticulitis   • Depression       Vitals:    10/09/19 1148   BP: 112/66   Pulse: 61   Temp: 98.4 °F (36.9 °C)   SpO2: 98%       Objective   Physical Exam   Constitutional: He is oriented to person, place, and time. He appears well-developed.   Eyes: EOM are normal. Pupils are equal, round, and reactive to light.   Neck: Normal range of motion. Neck supple. No JVD present. No tracheal deviation present. No thyromegaly present.   Cardiovascular: Normal rate, regular rhythm and normal heart sounds. Exam reveals no gallop and no friction rub.   No murmur heard.  Pulmonary/Chest: Effort normal and breath sounds normal. No respiratory distress. He has no wheezes.   Abdominal: Soft. Bowel sounds are normal. He exhibits no distension and no mass. There is no tenderness. There is no rebound and no guarding. No hernia.   Musculoskeletal: Normal range of motion.   Lymphadenopathy:     He has no cervical adenopathy.   Neurological: He is alert and oriented to person, place, and time. He displays normal reflexes. No cranial nerve deficit or sensory deficit. He exhibits normal muscle tone. Coordination normal.   Psychiatric: He has a normal mood and affect. His behavior is normal.   Nursing note and vitals reviewed.        Assessment/Plan   Zach was seen today for hospital follow up.    Diagnoses and all orders for this visit:    Acute diverticulitis  -     CBC & Differential  -     Comprehensive Metabolic Panel    Type 2 diabetes mellitus  without complication, without long-term current use of insulin (CMS/[facility])    Depression, unspecified depression type    Increase diet as tolerated.  Check temperature.  Finish of Augmentin.  Return as schedule if no further problems.  Okay to increase Zoloft to 75 mg daily.  His diverticulitis has subsided.  Diabetes under control.

## 2019-10-09 NOTE — OUTREACH NOTE
Medical Week 1 Survey      Responses   Facility patient discharged from?  [facility]   Does the patient have one of the following disease processes/diagnoses(primary or secondary)?  Other   Is there a successful TCM telephone encounter documented?  No   Week 1 attempt successful?  No   Unsuccessful attempts  Attempt 1          Robert Harden RN

## 2019-10-10 ENCOUNTER — READMISSION MANAGEMENT (OUTPATIENT)
Dept: CALL CENTER | Facility: HOSPITAL | Age: 79
End: 2019-10-10

## 2019-10-10 LAB
ALBUMIN SERPL-MCNC: 4.2 G/DL (ref 3.5–4.8)
ALBUMIN/GLOB SERPL: 1.6 {RATIO} (ref 1.2–2.2)
ALP SERPL-CCNC: 65 IU/L (ref 39–117)
ALT SERPL-CCNC: 11 IU/L (ref 0–44)
AST SERPL-CCNC: 16 IU/L (ref 0–40)
BASOPHILS # BLD AUTO: 0 X10E3/UL (ref 0–0.2)
BASOPHILS NFR BLD AUTO: 0 %
BILIRUB SERPL-MCNC: 0.2 MG/DL (ref 0–1.2)
BUN SERPL-MCNC: 12 MG/DL (ref 8–27)
BUN/CREAT SERPL: 12 (ref 10–24)
CALCIUM SERPL-MCNC: 9.3 MG/DL (ref 8.6–10.2)
CHLORIDE SERPL-SCNC: 100 MMOL/L (ref 96–106)
CO2 SERPL-SCNC: 24 MMOL/L (ref 20–29)
CREAT SERPL-MCNC: 1.04 MG/DL (ref 0.76–1.27)
EOSINOPHIL # BLD AUTO: 0.1 X10E3/UL (ref 0–0.4)
EOSINOPHIL NFR BLD AUTO: 1 %
ERYTHROCYTE [DISTWIDTH] IN BLOOD BY AUTOMATED COUNT: 13.1 % (ref 12.3–15.4)
GLOBULIN SER CALC-MCNC: 2.6 G/DL (ref 1.5–4.5)
GLUCOSE SERPL-MCNC: 105 MG/DL (ref 65–99)
HCT VFR BLD AUTO: 40.5 % (ref 37.5–51)
HGB BLD-MCNC: 13.8 G/DL (ref 13–17.7)
IMM GRANULOCYTES # BLD AUTO: 0 X10E3/UL (ref 0–0.1)
IMM GRANULOCYTES NFR BLD AUTO: 0 %
LYMPHOCYTES # BLD AUTO: 1.5 X10E3/UL (ref 0.7–3.1)
LYMPHOCYTES NFR BLD AUTO: 21 %
MCH RBC QN AUTO: 32.2 PG (ref 26.6–33)
MCHC RBC AUTO-ENTMCNC: 34.1 G/DL (ref 31.5–35.7)
MCV RBC AUTO: 94 FL (ref 79–97)
MONOCYTES # BLD AUTO: 0.4 X10E3/UL (ref 0.1–0.9)
MONOCYTES NFR BLD AUTO: 6 %
NEUTROPHILS # BLD AUTO: 5 X10E3/UL (ref 1.4–7)
NEUTROPHILS NFR BLD AUTO: 72 %
PLATELET # BLD AUTO: 331 X10E3/UL (ref 150–450)
POTASSIUM SERPL-SCNC: 4.4 MMOL/L (ref 3.5–5.2)
PROT SERPL-MCNC: 6.8 G/DL (ref 6–8.5)
RBC # BLD AUTO: 4.29 X10E6/UL (ref 4.14–5.8)
SODIUM SERPL-SCNC: 142 MMOL/L (ref 134–144)
WBC # BLD AUTO: 7 X10E3/UL (ref 3.4–10.8)

## 2019-10-10 NOTE — OUTREACH NOTE
Medical Week 1 Survey      Responses   Facility patient discharged from?  [facility]   Does the patient have one of the following disease processes/diagnoses(primary or secondary)?  Other   Is there a successful TCM telephone encounter documented?  No   Week 1 attempt successful?  No   Unsuccessful attempts  Attempt 2          Liane Villalobos RN

## 2019-10-14 ENCOUNTER — READMISSION MANAGEMENT (OUTPATIENT)
Dept: CALL CENTER | Facility: HOSPITAL | Age: 79
End: 2019-10-14

## 2019-10-15 ENCOUNTER — READMISSION MANAGEMENT (OUTPATIENT)
Dept: CALL CENTER | Facility: HOSPITAL | Age: 79
End: 2019-10-15

## 2019-10-15 NOTE — OUTREACH NOTE
Medical Week 2 Survey      Responses   Facility patient discharged from?  [facility]   Does the patient have one of the following disease processes/diagnoses(primary or secondary)?  Other   Week 2 attempt successful?  Yes   Call start time  1354   Discharge diagnosis  diverticulitis   Call end time  1400   Is patient permission given to speak with other caregiver?  Yes   List who call center can speak with  wife, Allie Narayanan reviewed with patient/caregiver?  Yes   Is the patient having any side effects they believe may be caused by any medication additions or changes?  No   Does the patient have all medications ordered at discharge?  Yes   Is the patient taking all medications as directed (includes completed medication regime)?  Yes   Medication comments  Completed antibiotic   Does the patient have a primary care provider?   Yes   Does the patient have an appointment with their PCP within 7 days of discharge?  Yes   Has the patient kept scheduled appointments due by today?  Yes   Comments  Dr Payan 11/26        Has seen PCP   Has home health visited the patient within 72 hours of discharge?  N/A   Psychosocial issues?  No   Did the patient receive a copy of their discharge instructions?  Yes   Nursing interventions  Reviewed instructions with patient   What is the patient's perception of their health status since discharge?  Improving   Is the patient/caregiver able to teach back the hierarchy of who to call/visit for symptoms/problems? PCP, Specialist, Home health nurse, Urgent Care, ED, 911  Yes   Week 2 Call Completed?  Yes          Liane Villalobos RN

## 2019-10-24 ENCOUNTER — READMISSION MANAGEMENT (OUTPATIENT)
Dept: CALL CENTER | Facility: HOSPITAL | Age: 79
End: 2019-10-24

## 2019-10-24 NOTE — OUTREACH NOTE
Medical Week 3 Survey      Responses   Facility patient discharged from?  [facility]   Does the patient have one of the following disease processes/diagnoses(primary or secondary)?  Other   Week 3 attempt successful?  No   Unsuccessful attempts  Attempt 1          Yeimy Fuchs RN

## 2019-10-26 ENCOUNTER — READMISSION MANAGEMENT (OUTPATIENT)
Dept: CALL CENTER | Facility: HOSPITAL | Age: 79
End: 2019-10-26

## 2019-10-26 NOTE — OUTREACH NOTE
Medical Week 3 Survey      Responses   Facility patient discharged from?  [facility]   Does the patient have one of the following disease processes/diagnoses(primary or secondary)?  Other   Week 3 attempt successful?  No   Unsuccessful attempts  Attempt 2          Lalitha Chandra RN

## 2019-11-25 ENCOUNTER — OFFICE VISIT (OUTPATIENT)
Dept: GASTROENTEROLOGY | Facility: CLINIC | Age: 79
End: 2019-11-25

## 2019-11-25 VITALS — BODY MASS INDEX: 29.23 KG/M2 | WEIGHT: 208.8 LBS | HEIGHT: 71 IN

## 2019-11-25 DIAGNOSIS — F02.80 LATE ONSET ALZHEIMER'S DISEASE WITHOUT BEHAVIORAL DISTURBANCE (HCC): ICD-10-CM

## 2019-11-25 DIAGNOSIS — E11.9 TYPE 2 DIABETES MELLITUS WITHOUT COMPLICATION, WITHOUT LONG-TERM CURRENT USE OF INSULIN (HCC): ICD-10-CM

## 2019-11-25 DIAGNOSIS — G30.1 LATE ONSET ALZHEIMER'S DISEASE WITHOUT BEHAVIORAL DISTURBANCE (HCC): ICD-10-CM

## 2019-11-25 DIAGNOSIS — K21.9 GASTROESOPHAGEAL REFLUX DISEASE WITHOUT ESOPHAGITIS: Primary | ICD-10-CM

## 2019-11-25 PROCEDURE — 99212 OFFICE O/P EST SF 10 MIN: CPT | Performed by: INTERNAL MEDICINE

## 2019-11-25 NOTE — PROGRESS NOTES
PATIENT INFORMATION  Zach Gutierrez       - 1940    CHIEF COMPLAINT  Chief Complaint   Patient presents with   • Follow-up     hospital follow up on diverticulitis       HISTORY OF PRESENT ILLNESS  Her after hosp for Diverticulitis and ha done well no repeat imaging and feels well and continues to move BMs 3 a day.     Was refusing to eat his Fruits and vegetables but it is his memary that is hanging on to rec's from when he was ill.            REVIEW OF SYSTEMS  Review of Systems   All other systems reviewed and are negative.        ACTIVE PROBLEMS  Patient Active Problem List    Diagnosis   • Depression [F32.9]   • Acute diverticulitis [K57.92]   • Acquired spondylolysis [M43.00]   • Extremity pain [M79.609]   • Lumbar canal stenosis [M48.061]   • Alzheimer's disease (CMS/HCC) [G30.9, F02.80]   • Obstructive sleep apnea, adult [G47.33]   • Gastroesophageal reflux disease without esophagitis [K21.9]   • Type 2 diabetes mellitus without complication, without long-term current use of insulin (CMS/HCC) [E11.9]   • BPH with obstruction/lower urinary tract symptoms [N40.1, N13.8]   • Status post total right knee replacement [Z96.651]   • Osteoarthritis of right knee [M17.11]   • Dyslipidemia [E78.5]   • Mild cognitive impairment with memory loss [G31.84]   • Sleep apnea [G47.30]   • Hematuria [R31.9]         PAST MEDICAL HISTORY  Past Medical History:   Diagnosis Date   • Arthritis     BACK PAIN   • Dementia (CMS/HCC)    • Diabetes (CMS/HCC)     TYPE 2   • Knee sprain    • ZENA (obstructive sleep apnea)     cpap         SURGICAL HISTORY  Past Surgical History:   Procedure Laterality Date   • COLONOSCOPY N/A 2017    Procedure: COLONOSCOPY;  Surgeon: Srikanth Payan MD;  Location: [facility] OR;  Service:    • CYSTOSCOPY TRANSURETHRAL RESECTION OF PROSTATE N/A 10/16/2017    Procedure: CYSTOSCOPY TRANSURETHRAL RESECTION OF PROSTATE;  Surgeon: Basil Yang MD;  Location: [facility] OR;  Service:    •  "HERNIA REPAIR Bilateral     inguinal   • VT TOTAL KNEE ARTHROPLASTY Right 6/20/2017    Procedure: TOTAL KNEE ARTHROPLASTY ihma orthoalign antibiotic cement 7fr hemovac placement;  Surgeon: Bj Thrasher MD;  Location: [facility];  Service: Orthopedics   • SHOULDER SURGERY Right 2007    RCR         FAMILY HISTORY  Family History   Problem Relation Age of Onset   • Colon cancer Neg Hx    • Colon polyps Neg Hx          SOCIAL HISTORY  Social History     Occupational History   • Not on file   Tobacco Use   • Smoking status: Never Smoker   • Smokeless tobacco: Never Used   Substance and Sexual Activity   • Alcohol use: Yes     Comment: one jim/week   • Drug use: No   • Sexual activity: Defer       Debilities/Disabilities Identified: None    Emotional Behavior: Appropriate    CURRENT MEDICATIONS    Current Outpatient Medications:   •  donepezil (ARICEPT) 10 MG tablet, Take 10 mg by mouth Every Night., Disp: , Rfl:   •  fexofenadine (ALLEGRA) 180 MG tablet, Take 180 mg by mouth Daily., Disp: , Rfl:   •  finasteride (PROSCAR) 5 MG tablet, Take 5 mg by mouth Every Night., Disp: , Rfl:   •  memantine (NAMENDA XR) 28 MG capsule sustained-release 24 hr extended release capsule, Take 28 mg by mouth Every Night., Disp: , Rfl:   •  metFORMIN (GLUCOPHAGE) 500 MG tablet, TAKE 2 TABLETS TWICE A DAY, Disp: 360 tablet, Rfl: 2  •  Multiple Vitamin (MULTIVITAMIN) tablet, Take 1 tablet by mouth Daily., Disp: , Rfl:   •  Probiotic Product (PROBIOTIC DAILY PO), Take  by mouth., Disp: , Rfl:   •  sertraline (ZOLOFT) 50 MG tablet, Take 50 mg by mouth Daily., Disp: , Rfl:     ALLERGIES  Patient has no known allergies.    VITALS  Vitals:    11/25/19 1438   Weight: 94.7 kg (208 lb 12.8 oz)   Height: 180.3 cm (70.98\")       LAST RESULTS   Office Visit on 10/09/2019   Component Date Value Ref Range Status   • WBC 10/09/2019 7.0  3.4 - 10.8 x10E3/uL Final   • RBC 10/09/2019 4.29  4.14 - 5.80 x10E6/uL Final   • Hemoglobin 10/09/2019 13.8  " 13.0 - 17.7 g/dL Final   • Hematocrit 10/09/2019 40.5  37.5 - 51.0 % Final   • MCV 10/09/2019 94  79 - 97 fL Final   • MCH 10/09/2019 32.2  26.6 - 33.0 pg Final   • MCHC 10/09/2019 34.1  31.5 - 35.7 g/dL Final   • RDW 10/09/2019 13.1  12.3 - 15.4 % Final   • Platelets 10/09/2019 331  150 - 450 x10E3/uL Final   • Neutrophil Rel % 10/09/2019 72  Not Estab. % Final   • Lymphocyte Rel % 10/09/2019 21  Not Estab. % Final   • Monocyte Rel % 10/09/2019 6  Not Estab. % Final   • Eosinophil Rel % 10/09/2019 1  Not Estab. % Final   • Basophil Rel % 10/09/2019 0  Not Estab. % Final   • Neutrophils Absolute 10/09/2019 5.0  1.4 - 7.0 x10E3/uL Final   • Lymphocytes Absolute 10/09/2019 1.5  0.7 - 3.1 x10E3/uL Final   • Monocytes Absolute 10/09/2019 0.4  0.1 - 0.9 x10E3/uL Final   • Eosinophils Absolute 10/09/2019 0.1  0.0 - 0.4 x10E3/uL Final   • Basophils Absolute 10/09/2019 0.0  0.0 - 0.2 x10E3/uL Final   • Immature Granulocyte Rel % 10/09/2019 0  Not Estab. % Final   • Immature Grans Absolute 10/09/2019 0.0  0.0 - 0.1 x10E3/uL Final   • Glucose 10/09/2019 105* 65 - 99 mg/dL Final   • BUN 10/09/2019 12  8 - 27 mg/dL Final   • Creatinine 10/09/2019 1.04  0.76 - 1.27 mg/dL Final   • eGFR Non  Am 10/09/2019 69  >59 mL/min/1.73 Final   • eGFR African Am 10/09/2019 80  >59 mL/min/1.73 Final   • BUN/Creatinine Ratio 10/09/2019 12  10 - 24 Final   • Sodium 10/09/2019 142  134 - 144 mmol/L Final   • Potassium 10/09/2019 4.4  3.5 - 5.2 mmol/L Final   • Chloride 10/09/2019 100  96 - 106 mmol/L Final   • Total CO2 10/09/2019 24  20 - 29 mmol/L Final   • Calcium 10/09/2019 9.3  8.6 - 10.2 mg/dL Final   • Total Protein 10/09/2019 6.8  6.0 - 8.5 g/dL Final   • Albumin 10/09/2019 4.2  3.5 - 4.8 g/dL Final   • Globulin 10/09/2019 2.6  1.5 - 4.5 g/dL Final   • A/G Ratio 10/09/2019 1.6  1.2 - 2.2 Final   • Total Bilirubin 10/09/2019 0.2  0.0 - 1.2 mg/dL Final   • Alkaline Phosphatase 10/09/2019 65  39 - 117 IU/L Final   • AST (SGOT)  10/09/2019 16  0 - 40 IU/L Final   • ALT (SGPT) 10/09/2019 11  0 - 44 IU/L Final     No results found.    PHYSICAL EXAM  Physical Exam   Constitutional: He is oriented to person, place, and time. He appears well-developed and well-nourished.   HENT:   Head: Normocephalic and atraumatic.   Eyes: Conjunctivae and EOM are normal. Pupils are equal, round, and reactive to light. No scleral icterus.   Neck: Normal range of motion. Neck supple. No thyromegaly present.   Cardiovascular: Normal rate, regular rhythm, normal heart sounds and intact distal pulses. Exam reveals no gallop.   No murmur heard.  Pulmonary/Chest: Effort normal and breath sounds normal. He has no wheezes. He has no rales.   Abdominal: Soft. Bowel sounds are normal. He exhibits no shifting dullness, no distension, no fluid wave, no abdominal bruit, no ascites and no mass. There is no hepatosplenomegaly. There is no tenderness. There is no guarding and negative Cruz's sign. Hernia confirmed negative in the ventral area.   Musculoskeletal: Normal range of motion. He exhibits no edema.   Lymphadenopathy:     He has no cervical adenopathy.   Neurological: He is alert and oriented to person, place, and time.   Skin: Skin is warm and dry. No rash noted. He is not diaphoretic. No erythema.   Psychiatric: He has a normal mood and affect. His behavior is normal.       ASSESSMENT  Diagnoses and all orders for this visit:    Gastroesophageal reflux disease without esophagitis    Type 2 diabetes mellitus without complication, without long-term current use of insulin (CMS/[facility])    Late onset Alzheimer's disease without behavioral disturbance (CMS/[facility])          PLAN  Not being treated for GERD but will start a probiotic and resume a high fiber diet.    Reviewed his last colon in 5/2017 and no polyps and no need now to repeat so soon. Wife has fear of any sedation wrt to his Dementia.His recall is in for 2027    Return if symptoms worsen or fail to  improve.

## 2019-12-02 DIAGNOSIS — E78.5 DYSLIPIDEMIA: Primary | ICD-10-CM

## 2019-12-02 DIAGNOSIS — E11.9 TYPE 2 DIABETES MELLITUS WITHOUT COMPLICATION, WITHOUT LONG-TERM CURRENT USE OF INSULIN (HCC): ICD-10-CM

## 2019-12-02 DIAGNOSIS — K21.9 GASTROESOPHAGEAL REFLUX DISEASE WITHOUT ESOPHAGITIS: ICD-10-CM

## 2019-12-03 LAB
ALBUMIN SERPL-MCNC: 4.5 G/DL (ref 3.5–4.8)
ALBUMIN/CREAT UR: 6.3 MG/G CREAT (ref 0–30)
ALBUMIN/GLOB SERPL: 1.8 {RATIO} (ref 1.2–2.2)
ALP SERPL-CCNC: 81 IU/L (ref 39–117)
ALT SERPL-CCNC: 18 IU/L (ref 0–44)
AST SERPL-CCNC: 20 IU/L (ref 0–40)
BILIRUB SERPL-MCNC: 0.5 MG/DL (ref 0–1.2)
BUN SERPL-MCNC: 16 MG/DL (ref 8–27)
BUN/CREAT SERPL: 19 (ref 10–24)
CALCIUM SERPL-MCNC: 9.3 MG/DL (ref 8.6–10.2)
CHLORIDE SERPL-SCNC: 99 MMOL/L (ref 96–106)
CHOLEST SERPL-MCNC: 160 MG/DL (ref 100–199)
CHOLEST/HDLC SERPL: 3.1 RATIO (ref 0–5)
CK SERPL-CCNC: 36 U/L (ref 24–204)
CO2 SERPL-SCNC: 24 MMOL/L (ref 20–29)
CREAT SERPL-MCNC: 0.86 MG/DL (ref 0.76–1.27)
CREAT UR-MCNC: 151.8 MG/DL
GLOBULIN SER CALC-MCNC: 2.5 G/DL (ref 1.5–4.5)
GLUCOSE SERPL-MCNC: 160 MG/DL (ref 65–99)
HBA1C MFR BLD: 6.9 % (ref 4.8–5.6)
HDLC SERPL-MCNC: 52 MG/DL
LDLC SERPL CALC-MCNC: 76 MG/DL (ref 0–99)
MICROALBUMIN UR-MCNC: 9.5 UG/ML
POTASSIUM SERPL-SCNC: 4 MMOL/L (ref 3.5–5.2)
PROT SERPL-MCNC: 7 G/DL (ref 6–8.5)
SODIUM SERPL-SCNC: 139 MMOL/L (ref 134–144)
TRIGL SERPL-MCNC: 160 MG/DL (ref 0–149)
VLDLC SERPL CALC-MCNC: 32 MG/DL (ref 5–40)

## 2020-01-27 ENCOUNTER — OFFICE VISIT (OUTPATIENT)
Dept: FAMILY MEDICINE CLINIC | Facility: CLINIC | Age: 80
End: 2020-01-27

## 2020-01-27 VITALS
DIASTOLIC BLOOD PRESSURE: 72 MMHG | HEART RATE: 75 BPM | RESPIRATION RATE: 14 BRPM | HEIGHT: 70 IN | SYSTOLIC BLOOD PRESSURE: 122 MMHG | OXYGEN SATURATION: 98 % | BODY MASS INDEX: 29.78 KG/M2 | TEMPERATURE: 97.8 F | WEIGHT: 208 LBS

## 2020-01-27 DIAGNOSIS — F02.80 LATE ONSET ALZHEIMER'S DISEASE WITHOUT BEHAVIORAL DISTURBANCE (HCC): ICD-10-CM

## 2020-01-27 DIAGNOSIS — E11.9 TYPE 2 DIABETES MELLITUS WITHOUT COMPLICATION, WITHOUT LONG-TERM CURRENT USE OF INSULIN (HCC): ICD-10-CM

## 2020-01-27 DIAGNOSIS — G30.1 LATE ONSET ALZHEIMER'S DISEASE WITHOUT BEHAVIORAL DISTURBANCE (HCC): ICD-10-CM

## 2020-01-27 DIAGNOSIS — K21.9 GASTROESOPHAGEAL REFLUX DISEASE WITHOUT ESOPHAGITIS: Primary | ICD-10-CM

## 2020-01-27 DIAGNOSIS — F32.A DEPRESSION, UNSPECIFIED DEPRESSION TYPE: ICD-10-CM

## 2020-01-27 PROCEDURE — 99213 OFFICE O/P EST LOW 20 MIN: CPT | Performed by: INTERNAL MEDICINE

## 2020-01-27 NOTE — PROGRESS NOTES
AMANDA@  Zach Gutierrez is a 79 y.o. male.     Chief Complaint   Patient presents with   • Diabetes   • Heartburn   • Alzheimer's Disease   Depression    History of Present Illness   Follow-up for diabetes, hyperlipidemia, GERD, Alzheimer's disease.  Patient also on Zoloft for depression.  He is doing well at times he is little bit depressed.  His cognitive status is getting worse.  Discussed with various precautions with the spouse regarding him not cooking, not being alone in the bathtub.  Fall precautions.  Patient currently not driving  The following portions of the patient's history were reviewed and updated as appropriate: allergies, current medications, past family history, past medical history, past social history, past surgical history and problem list.    Review of Systems   Constitutional: Negative for activity change, appetite change, diaphoresis and unexpected weight gain.   Eyes: Negative for blurred vision and double vision.   Respiratory: Negative.  Negative for shortness of breath.    Cardiovascular: Negative.  Negative for chest pain, palpitations and leg swelling.   Gastrointestinal: Negative.    Skin: Negative for skin lesions.   Neurological: Negative for dizziness, syncope, numbness and headache.        Decline in cognitive status   Psychiatric/Behavioral: Negative for agitation and behavioral problems.       No Known Allergies    Current Outpatient Medications on File Prior to Visit   Medication Sig Dispense Refill   • donepezil (ARICEPT) 10 MG tablet Take 10 mg by mouth Every Night.     • fexofenadine (ALLEGRA) 180 MG tablet Take 180 mg by mouth Daily.     • finasteride (PROSCAR) 5 MG tablet Take 5 mg by mouth Every Night.     • memantine (NAMENDA XR) 28 MG capsule sustained-release 24 hr extended release capsule Take 28 mg by mouth Every Night.     • metFORMIN (GLUCOPHAGE) 500 MG tablet TAKE 2 TABLETS TWICE A  tablet 2   • Multiple Vitamin (MULTIVITAMIN) tablet Take 1 tablet  by mouth Daily.     • Probiotic Product (PROBIOTIC DAILY PO) Take  by mouth.     • sertraline (ZOLOFT) 50 MG tablet Take 50 mg by mouth Daily.       No current facility-administered medications on file prior to visit.        Family History   Problem Relation Age of Onset   • Colon cancer Neg Hx    • Colon polyps Neg Hx        Past Medical History:   Diagnosis Date   • Arthritis     BACK PAIN   • Dementia (CMS/HCC)    • Diabetes (CMS/HCC)     TYPE 2   • Knee sprain    • ZENA (obstructive sleep apnea)     cpap       Past Surgical History:   Procedure Laterality Date   • COLONOSCOPY N/A 5/19/2017    Procedure: COLONOSCOPY;  Surgeon: Srikanth Payan MD;  Location: [facility] OR;  Service:    • CYSTOSCOPY TRANSURETHRAL RESECTION OF PROSTATE N/A 10/16/2017    Procedure: CYSTOSCOPY TRANSURETHRAL RESECTION OF PROSTATE;  Surgeon: Basil Yang MD;  Location: [facility] OR;  Service:    • HERNIA REPAIR Bilateral     inguinal   • IN TOTAL KNEE ARTHROPLASTY Right 6/20/2017    Procedure: TOTAL KNEE ARTHROPLASTY hima orthoalign antibiotic cement 7fr hemovac placement;  Surgeon: Bj Thrasher MD;  Location: [facility] OR;  Service: Orthopedics   • SHOULDER SURGERY Right 2007    RCR       Social History     Socioeconomic History   • Marital status:      Spouse name: Not on file   • Number of children: Not on file   • Years of education: Not on file   • Highest education level: Not on file   Tobacco Use   • Smoking status: Never Smoker   • Smokeless tobacco: Never Used   Substance and Sexual Activity   • Alcohol use: Yes     Comment: one jim/week   • Drug use: No   • Sexual activity: Defer       Patient Active Problem List   Diagnosis   • Osteoarthritis of right knee   • Status post total right knee replacement   • BPH with obstruction/lower urinary tract symptoms   • Obstructive sleep apnea, adult   • Gastroesophageal reflux disease without esophagitis   • Type 2 diabetes mellitus without complication, without  "long-term current use of insulin (CMS/HCC)   • Acquired spondylolysis   • Dyslipidemia   • Extremity pain   • Lumbar canal stenosis   • Hematuria   • Mild cognitive impairment with memory loss   • Sleep apnea   • Alzheimer's disease (CMS/HCC)   • Acute diverticulitis   • Depression       /72 (BP Location: Right arm, Patient Position: Sitting, Cuff Size: Adult)   Pulse 75   Temp 97.8 °F (36.6 °C) (Oral)   Resp 14   Ht 177.8 cm (70\")   Wt 94.3 kg (208 lb)   SpO2 98%   BMI 29.84 kg/m²   Body mass index is 29.84 kg/m².    Objective   Physical Exam   Constitutional: He is oriented to person, place, and time. He appears well-developed and well-nourished.   HENT:   Head: Normocephalic and atraumatic.   Eyes: Pupils are equal, round, and reactive to light. EOM are normal.   Neck: Normal range of motion. Neck supple. No JVD present. No tracheal deviation present. No thyromegaly present.   Cardiovascular: Normal rate, regular rhythm, normal heart sounds and intact distal pulses. Exam reveals no friction rub.   No murmur heard.  Pulmonary/Chest: Effort normal and breath sounds normal. No respiratory distress. He has no wheezes.   Abdominal: Soft. Bowel sounds are normal. He exhibits no distension and no mass. There is no tenderness. There is no rebound and no guarding. No hernia.   Musculoskeletal: Normal range of motion.   Lymphadenopathy:     He has no cervical adenopathy.   Neurological: He is alert and oriented to person, place, and time. He displays normal reflexes. No cranial nerve deficit or sensory deficit. He exhibits normal muscle tone. Coordination normal.   Psychiatric: He has a normal mood and affect. His behavior is normal.   Nursing note and vitals reviewed.        Assessment/Plan   Zach was seen today for diabetes, heartburn and alzheimer's disease.    Diagnoses and all orders for this visit:    Gastroesophageal reflux disease without esophagitis  -     Cancel: Comprehensive metabolic panel  -    "  Cancel: Hemoglobin A1c  -     Cancel: Lipid Panel w/ Chol/HDL Ratio  -     Cancel: CBC & Differential    Type 2 diabetes mellitus without complication, without long-term current use of insulin (CMS/[facility])  -     Cancel: Comprehensive metabolic panel  -     Cancel: Hemoglobin A1c  -     Cancel: Lipid Panel w/ Chol/HDL Ratio  -     Cancel: CBC & Differential    Late onset Alzheimer's disease without behavioral disturbance (CMS/HCC)  -     Cancel: Comprehensive metabolic panel  -     Cancel: Hemoglobin A1c  -     Cancel: Lipid Panel w/ Chol/HDL Ratio  -     Cancel: CBC & Differential    Depression, unspecified depression type  -     Cancel: Comprehensive metabolic panel  -     Cancel: Hemoglobin A1c  -     Cancel: Lipid Panel w/ Chol/HDL Ratio  -     Cancel: CBC & Differential    Continue diet and exercise.  Continue all current medication including Aricept and Namenda, metformin.  He is also on Zoloft.  All his symptoms are chronic and stable except for dementia his cognitive status getting worse.  Discussed with wife getting living well for EMS.  Done in 3 months time.

## 2020-01-27 NOTE — PROGRESS NOTES
Subsequent Medicare Wellness Visit   The ABC's of the Annual Wellness Visit    Chief Complaint   Patient presents with   • Diabetes   • Heartburn   • Alzheimer's Disease       HPI:  Zach Gutierrez, -1940, is a 79 y.o. male who presents for a Subsequent Medicare Wellness Visit.    Recent Hospitalizations:  No hospitalization(s) within the last year..    Current Medical Providers:  Patient Care Team:  Jessica Mac MD as PCP - General  Jessica Mac MD as PCP - Family Medicine  Jessica Mac MD as PCP - Claims Attributed    Health Habits and Functional and Cognitive Screening and Depression Screening:  Functional & Cognitive Status 2020   Do you have difficulty preparing food and eating? Yes   Do you have difficulty bathing yourself, getting dressed or grooming yourself? No   Do you have difficulty using the toilet? No   Do you have difficulty moving around from place to place? Yes   Do you have trouble with steps or getting out of a bed or a chair? No   Current Diet Well Balanced Diet   Dental Exam Up to date   Eye Exam Up to date   Exercise (times per week) 0 times per week   Current Exercise Activities Include None   Do you need help using the phone?  Yes   Are you deaf or do you have serious difficulty hearing?  Yes   Do you need help with transportation? No   Do you need help shopping? No   Do you need help preparing meals?  No   Do you need help with housework?  No   Do you need help with laundry? No   Do you need help taking your medications? No   Do you need help managing money? No   Do you ever drive or ride in a car without wearing a seat belt? No   Have you felt unusual stress, anger or loneliness in the last month? No   Who do you live with? Spouse   If you need help, do you have trouble finding someone available to you? No   Have you been bothered in the last four weeks by sexual problems? No   Do you have difficulty concentrating, remembering or making decisions? Yes        Compared to one year ago, the patient feels his physical health is the same and his mental health is worse.    Depression Screen:  PHQ-2/PHQ-9 Depression Screening 1/27/2020   Little interest or pleasure in doing things 0   Feeling down, depressed, or hopeless 0   Total Score 0         Past Medical/Family/Social History:  The following portions of the patient's history were reviewed and updated as appropriate: allergies, current medications, past family history, past medical history, past social history, past surgical history and problem list.    No Known Allergies      Current Outpatient Medications:   •  donepezil (ARICEPT) 10 MG tablet, Take 10 mg by mouth Every Night., Disp: , Rfl:   •  fexofenadine (ALLEGRA) 180 MG tablet, Take 180 mg by mouth Daily., Disp: , Rfl:   •  finasteride (PROSCAR) 5 MG tablet, Take 5 mg by mouth Every Night., Disp: , Rfl:   •  memantine (NAMENDA XR) 28 MG capsule sustained-release 24 hr extended release capsule, Take 28 mg by mouth Every Night., Disp: , Rfl:   •  metFORMIN (GLUCOPHAGE) 500 MG tablet, TAKE 2 TABLETS TWICE A DAY, Disp: 360 tablet, Rfl: 2  •  Multiple Vitamin (MULTIVITAMIN) tablet, Take 1 tablet by mouth Daily., Disp: , Rfl:   •  Probiotic Product (PROBIOTIC DAILY PO), Take  by mouth., Disp: , Rfl:   •  sertraline (ZOLOFT) 50 MG tablet, Take 50 mg by mouth Daily., Disp: , Rfl:     Aspirin use counseling: Does not need ASA (and currently is not on it)    Current medication list contains no high risk medications.  No harmful drug interactions have been identified.     Family History   Problem Relation Age of Onset   • Colon cancer Neg Hx    • Colon polyps Neg Hx        Social History     Tobacco Use   • Smoking status: Never Smoker   • Smokeless tobacco: Never Used   Substance Use Topics   • Alcohol use: Yes     Comment: one jim/week       Past Surgical History:   Procedure Laterality Date   • COLONOSCOPY N/A 5/19/2017    Procedure: COLONOSCOPY;  Surgeon:  "Srikanth Payan MD;  Location: [facility] OR;  Service:    • CYSTOSCOPY TRANSURETHRAL RESECTION OF PROSTATE N/A 10/16/2017    Procedure: CYSTOSCOPY TRANSURETHRAL RESECTION OF PROSTATE;  Surgeon: Basil Yang MD;  Location: [facility] OR;  Service:    • HERNIA REPAIR Bilateral     inguinal   • IN TOTAL KNEE ARTHROPLASTY Right 6/20/2017    Procedure: TOTAL KNEE ARTHROPLASTY hima orthoalign antibiotic cement 7fr hemovac placement;  Surgeon: Bj Thrasher MD;  Location: [facility] OR;  Service: Orthopedics   • SHOULDER SURGERY Right 2007    RCR       Patient Active Problem List   Diagnosis   • Osteoarthritis of right knee   • Status post total right knee replacement   • BPH with obstruction/lower urinary tract symptoms   • Obstructive sleep apnea, adult   • Gastroesophageal reflux disease without esophagitis   • Type 2 diabetes mellitus without complication, without long-term current use of insulin (CMS/[facility])   • Acquired spondylolysis   • Dyslipidemia   • Extremity pain   • Lumbar canal stenosis   • Hematuria   • Mild cognitive impairment with memory loss   • Sleep apnea   • Alzheimer's disease (CMS/HCC)   • Acute diverticulitis   • Depression       Review of Systems    Objective     Vitals:    01/27/20 0826   BP: 122/72   BP Location: Right arm   Patient Position: Sitting   Cuff Size: Adult   Pulse: 75   Resp: 14   Temp: 97.8 °F (36.6 °C)   TempSrc: Oral   SpO2: 98%   Weight: 94.3 kg (208 lb)   Height: 177.8 cm (70\")       Patient's Body mass index is 29.84 kg/m². BMI is within normal parameters. No follow-up required..      No exam data present    The patient has no evidence of cognitve impairment.     Physical Exam    Recent Lab Results:  Lab Results   Component Value Date     (H) 12/02/2019     Lab Results   Component Value Date    TRIG 160 (H) 12/02/2019    HDL 52 12/02/2019    VLDL 32 12/02/2019       Assessment/Plan   Age-appropriate Screening Schedule:  Refer to the list below for future screening " recommendations based on patient's age, sex and/or medical conditions.      Health Maintenance   Topic Date Due   • TDAP/TD VACCINES (1 - Tdap) 08/28/1951   • ZOSTER VACCINE (2 of 2) 03/12/2020   • DIABETIC EYE EXAM  05/07/2020   • HEMOGLOBIN A1C  06/02/2020   • URINE MICROALBUMIN  12/02/2020   • COLONOSCOPY  05/19/2027   • INFLUENZA VACCINE  Completed       Medicare Risks and Personalized Health Plan:  Advance Directive Discussion  Fall Risk      CMS-Preventive Services Quick Reference  Medicare Preventive Services Addressed:  Annual Wellness Visit (AWV)    Advance Care Planning:  Patient has an advance directive - a copy has been provided and is visible in patient header    There are no diagnoses linked to this encounter.    An After Visit Summary and PPPS with all of these plans were given to the patient.      Follow Up:  No follow-ups on file.        Continue diet and exercise.  Continue current medication.  Colonoscopy every 10 years.   Flu shot every year, pneumonia shot every 5 to 10 years.  Tetanus every 5 to 10 years.  Keep pathways clear and lighted.  Fall precaution.    Discussed with the wife make sure patient does not go into tub by himself, make sure he does not cook.

## 2020-05-14 ENCOUNTER — TELEPHONE (OUTPATIENT)
Dept: FAMILY MEDICINE CLINIC | Facility: CLINIC | Age: 80
End: 2020-05-14

## 2020-05-14 NOTE — TELEPHONE ENCOUNTER
Patient wife states that he is having pain that is located between the zipper of his jeans and his belt. Wife is concerned could be diverticulosis

## 2020-05-14 NOTE — TELEPHONE ENCOUNTER
I called Zach and spoke with his wife as he has dementia.  Per Dr. Mac she was advised diverticulosis is usually on the side of the abdomin, not the middle.  I advised her Dr. Mac wanted him to go to .  She stated she will keep an eye on him and take him in the morning if she thinks he needs to go.  I again advised her Dr. Mac said she should take him to UC.  JANETH Kunz CMA

## 2020-07-16 ENCOUNTER — OFFICE VISIT (OUTPATIENT)
Dept: FAMILY MEDICINE CLINIC | Facility: CLINIC | Age: 80
End: 2020-07-16

## 2020-07-16 VITALS — SYSTOLIC BLOOD PRESSURE: 118 MMHG | DIASTOLIC BLOOD PRESSURE: 64 MMHG

## 2020-07-16 DIAGNOSIS — E11.9 TYPE 2 DIABETES MELLITUS WITHOUT COMPLICATION, WITHOUT LONG-TERM CURRENT USE OF INSULIN (HCC): Primary | ICD-10-CM

## 2020-07-16 DIAGNOSIS — G30.1 LATE ONSET ALZHEIMER'S DISEASE WITHOUT BEHAVIORAL DISTURBANCE (HCC): ICD-10-CM

## 2020-07-16 DIAGNOSIS — E78.5 DYSLIPIDEMIA: ICD-10-CM

## 2020-07-16 DIAGNOSIS — F02.80 LATE ONSET ALZHEIMER'S DISEASE WITHOUT BEHAVIORAL DISTURBANCE (HCC): ICD-10-CM

## 2020-07-16 PROCEDURE — 99213 OFFICE O/P EST LOW 20 MIN: CPT | Performed by: INTERNAL MEDICINE

## 2020-07-16 RX ORDER — QUETIAPINE FUMARATE 25 MG/1
TABLET, FILM COATED ORAL
COMMUNITY
Start: 2020-07-13

## 2020-07-16 NOTE — PROGRESS NOTES
MOREGIN@  Zach Gutierrez is a 79 y.o. male.     Chief Complaint   Patient presents with   • Diabetes   • Alzheimer's Disease   • Osteoarthritis       History of Present Illness   Patient here follow-up for diabetes, Alzheimer's, DJD.  Patient on Namenda and Aricept.  Per wife cognitively he is getting worse.  At times he is paranoid, not sleeping much at night.  He is on Seroquel.  Also on Zoloft.  Taking metformin it was increased to 750 twice a day.  Occasionally checks blood sugars.  Patient at times does not want to drink water because it smells, does normally eat eggs because comes from a dirty chicken.  The following portions of the patient's history were reviewed and updated as appropriate: allergies, current medications, past family history, past medical history, past social history, past surgical history and problem list.    Review of Systems   Constitutional: Negative for activity change, appetite change, diaphoresis and unexpected weight gain.   Eyes: Negative for blurred vision and double vision.   Respiratory: Negative.  Negative for shortness of breath.    Cardiovascular: Negative for chest pain, palpitations and leg swelling.   Gastrointestinal: Negative.    Skin: Negative for skin lesions.   Neurological: Positive for confusion. Negative for dizziness, syncope, numbness and headache.   Psychiatric/Behavioral: Positive for behavioral problems.       No Known Allergies    Current Outpatient Medications on File Prior to Visit   Medication Sig Dispense Refill   • donepezil (ARICEPT) 10 MG tablet Take 10 mg by mouth Every Night.     • finasteride (PROSCAR) 5 MG tablet Take 5 mg by mouth Every Night.     • memantine (NAMENDA XR) 28 MG capsule sustained-release 24 hr extended release capsule Take 28 mg by mouth Every Night.     • metFORMIN (GLUCOPHAGE) 500 MG tablet TAKE 2 TABLETS TWICE A  tablet 3   • QUEtiapine (SEROquel) 25 MG tablet Take  by mouth.     • sertraline (ZOLOFT) 50 MG tablet  Take 50 mg by mouth Daily.     • fexofenadine (ALLEGRA) 180 MG tablet Take 180 mg by mouth Daily.     • Multiple Vitamin (MULTIVITAMIN) tablet Take 1 tablet by mouth Daily.     • Probiotic Product (PROBIOTIC DAILY PO) Take  by mouth.       No current facility-administered medications on file prior to visit.        Family History   Problem Relation Age of Onset   • Colon cancer Neg Hx    • Colon polyps Neg Hx        Past Medical History:   Diagnosis Date   • Arthritis     BACK PAIN   • Dementia (CMS/HCC)    • Diabetes (CMS/HCC)     TYPE 2   • Knee sprain    • ZENA (obstructive sleep apnea)     cpap       Past Surgical History:   Procedure Laterality Date   • COLONOSCOPY N/A 5/19/2017    Procedure: COLONOSCOPY;  Surgeon: Srikanth Payan MD;  Location: [facility] OR;  Service:    • CYSTOSCOPY TRANSURETHRAL RESECTION OF PROSTATE N/A 10/16/2017    Procedure: CYSTOSCOPY TRANSURETHRAL RESECTION OF PROSTATE;  Surgeon: Basil Yang MD;  Location: [facility] OR;  Service:    • HERNIA REPAIR Bilateral     inguinal   • [place] TOTAL KNEE ARTHROPLASTY Right 6/20/2017    Procedure: TOTAL KNEE ARTHROPLASTY hima orthoalign antibiotic cement 7fr hemovac placement;  Surgeon: Bj Thrasher MD;  Location: [facility] OR;  Service: Orthopedics   • SHOULDER SURGERY Right 2007    RCR       Social History     Socioeconomic History   • Marital status:      Spouse name: Not on file   • Number of children: Not on file   • Years of education: Not on file   • Highest education level: Not on file   Tobacco Use   • Smoking status: Never Smoker   • Smokeless tobacco: Never Used   Substance and Sexual Activity   • Alcohol use: Yes     Comment: one jim/week   • Drug use: No   • Sexual activity: Defer       Patient Active Problem List   Diagnosis   • Osteoarthritis of right knee   • Status post total right knee replacement   • BPH with obstruction/lower urinary tract symptoms   • Obstructive sleep apnea, adult   • Gastroesophageal reflux  disease without esophagitis   • Type 2 diabetes mellitus without complication, without long-term current use of insulin (CMS/HCC)   • Acquired spondylolysis   • Dyslipidemia   • Extremity pain   • Lumbar canal stenosis   • Hematuria   • Mild cognitive impairment with memory loss   • Sleep apnea   • Alzheimer's disease (CMS/HCC)   • Acute diverticulitis   • Depression       /64   There is no height or weight on file to calculate BMI.    Objective   Physical Exam   Constitutional: He is oriented to person, place, and time. He appears well-developed.   Eyes: Pupils are equal, round, and reactive to light. EOM are normal.   Neck: Normal range of motion. Neck supple. No JVD present. No tracheal deviation present. No thyromegaly present.   Cardiovascular: Normal rate, regular rhythm and normal heart sounds. Exam reveals no gallop and no friction rub.   No murmur heard.  Pulmonary/Chest: Effort normal and breath sounds normal. No respiratory distress. He has no wheezes.   Abdominal: Soft. Bowel sounds are normal. He exhibits no distension and no mass. There is no tenderness. There is no rebound and no guarding. No hernia.   Musculoskeletal: Normal range of motion.   Lymphadenopathy:     He has no cervical adenopathy.   Neurological: He is alert and oriented to person, place, and time. He displays normal reflexes. No cranial nerve deficit. He exhibits normal muscle tone. Coordination normal.   Long-term memories okay, short-term memory is getting worse   Psychiatric: He has a normal mood and affect. His behavior is normal.         Assessment/Plan   Zach was seen today for diabetes, alzheimer's disease and osteoarthritis.    Diagnoses and all orders for this visit:    Type 2 diabetes mellitus without complication, without long-term current use of insulin (CMS/[facility])  -     CBC & Differential  -     Comprehensive Metabolic Panel  -     Lipid Panel With / Chol / HDL Ratio  -     Hemoglobin A1c  -     TSH    Late onset  Alzheimer's disease without behavioral disturbance (CMS/HCC)  -     CBC & Differential  -     Comprehensive Metabolic Panel  -     Lipid Panel With / Chol / HDL Ratio  -     Hemoglobin A1c  -     TSH    Dyslipidemia  -     CBC & Differential  -     Comprehensive Metabolic Panel  -     Lipid Panel With / Chol / HDL Ratio  -     Hemoglobin A1c  -     TSH    Continue diet and exercise.  Discussed with the patient's wife do not intend to have tight control on his diabetes.  Check blood sugars.  Check blood pressure.  Continue all current medications.  I may come may need to reevaluate if patient's benefit from taking Aricept and Namenda.  She will also start looking for a assisted living with the dementia unit.  He would be appropriate for it all his problems are chronic and stable except for dementia this keeps on getting worse, this is expected course

## 2020-07-17 LAB
ALBUMIN SERPL-MCNC: 4.2 G/DL (ref 3.7–4.7)
ALBUMIN/GLOB SERPL: 1.5 {RATIO} (ref 1.2–2.2)
ALP SERPL-CCNC: 90 IU/L (ref 39–117)
ALT SERPL-CCNC: 13 IU/L (ref 0–44)
AST SERPL-CCNC: 20 IU/L (ref 0–40)
BASOPHILS # BLD AUTO: 0.1 X10E3/UL (ref 0–0.2)
BASOPHILS NFR BLD AUTO: 1 %
BILIRUB SERPL-MCNC: 0.4 MG/DL (ref 0–1.2)
BUN SERPL-MCNC: 14 MG/DL (ref 8–27)
BUN/CREAT SERPL: 17 (ref 10–24)
CALCIUM SERPL-MCNC: 9.4 MG/DL (ref 8.6–10.2)
CHLORIDE SERPL-SCNC: 103 MMOL/L (ref 96–106)
CHOLEST SERPL-MCNC: 154 MG/DL (ref 100–199)
CHOLEST/HDLC SERPL: 3.3 RATIO (ref 0–5)
CO2 SERPL-SCNC: 29 MMOL/L (ref 20–29)
CREAT SERPL-MCNC: 0.84 MG/DL (ref 0.76–1.27)
EOSINOPHIL # BLD AUTO: 0.1 X10E3/UL (ref 0–0.4)
EOSINOPHIL NFR BLD AUTO: 1 %
ERYTHROCYTE [DISTWIDTH] IN BLOOD BY AUTOMATED COUNT: 12.2 % (ref 11.6–15.4)
GLOBULIN SER CALC-MCNC: 2.8 G/DL (ref 1.5–4.5)
GLUCOSE SERPL-MCNC: 143 MG/DL (ref 65–99)
HBA1C MFR BLD: 7.3 % (ref 4.8–5.6)
HCT VFR BLD AUTO: 45 % (ref 37.5–51)
HDLC SERPL-MCNC: 46 MG/DL
HGB BLD-MCNC: 14.8 G/DL (ref 13–17.7)
IMM GRANULOCYTES # BLD AUTO: 0 X10E3/UL (ref 0–0.1)
IMM GRANULOCYTES NFR BLD AUTO: 0 %
LDLC SERPL CALC-MCNC: 81 MG/DL (ref 0–99)
LYMPHOCYTES # BLD AUTO: 1.8 X10E3/UL (ref 0.7–3.1)
LYMPHOCYTES NFR BLD AUTO: 30 %
MCH RBC QN AUTO: 32.2 PG (ref 26.6–33)
MCHC RBC AUTO-ENTMCNC: 32.9 G/DL (ref 31.5–35.7)
MCV RBC AUTO: 98 FL (ref 79–97)
MONOCYTES # BLD AUTO: 0.5 X10E3/UL (ref 0.1–0.9)
MONOCYTES NFR BLD AUTO: 8 %
NEUTROPHILS # BLD AUTO: 3.5 X10E3/UL (ref 1.4–7)
NEUTROPHILS NFR BLD AUTO: 60 %
PLATELET # BLD AUTO: 236 X10E3/UL (ref 150–450)
POTASSIUM SERPL-SCNC: 4.5 MMOL/L (ref 3.5–5.2)
PROT SERPL-MCNC: 7 G/DL (ref 6–8.5)
RBC # BLD AUTO: 4.6 X10E6/UL (ref 4.14–5.8)
SODIUM SERPL-SCNC: 143 MMOL/L (ref 134–144)
TRIGL SERPL-MCNC: 135 MG/DL (ref 0–149)
TSH SERPL DL<=0.005 MIU/L-ACNC: 2.3 UIU/ML (ref 0.45–4.5)
VLDLC SERPL CALC-MCNC: 27 MG/DL (ref 5–40)
WBC # BLD AUTO: 6 X10E3/UL (ref 3.4–10.8)

## 2020-09-08 ENCOUNTER — TELEPHONE (OUTPATIENT)
Dept: FAMILY MEDICINE CLINIC | Facility: CLINIC | Age: 80
End: 2020-09-08

## 2020-09-08 NOTE — TELEPHONE ENCOUNTER
Wife, Allie, called regarding Gamal being moved into the [facility] this Sat, 9/12/2020. He needs a H/P before admitting

## 2020-09-09 ENCOUNTER — OFFICE VISIT (OUTPATIENT)
Dept: FAMILY MEDICINE CLINIC | Facility: CLINIC | Age: 80
End: 2020-09-09

## 2020-09-09 VITALS
HEIGHT: 70 IN | OXYGEN SATURATION: 97 % | RESPIRATION RATE: 16 BRPM | DIASTOLIC BLOOD PRESSURE: 72 MMHG | SYSTOLIC BLOOD PRESSURE: 120 MMHG | BODY MASS INDEX: 29.63 KG/M2 | TEMPERATURE: 97.6 F | HEART RATE: 69 BPM | WEIGHT: 207 LBS

## 2020-09-09 DIAGNOSIS — K21.9 GASTROESOPHAGEAL REFLUX DISEASE WITHOUT ESOPHAGITIS: ICD-10-CM

## 2020-09-09 DIAGNOSIS — M17.11 PRIMARY OSTEOARTHRITIS OF RIGHT KNEE: ICD-10-CM

## 2020-09-09 DIAGNOSIS — G47.33 OBSTRUCTIVE SLEEP APNEA, ADULT: Primary | ICD-10-CM

## 2020-09-09 DIAGNOSIS — N40.1 BPH WITH OBSTRUCTION/LOWER URINARY TRACT SYMPTOMS: ICD-10-CM

## 2020-09-09 DIAGNOSIS — E78.5 DYSLIPIDEMIA: ICD-10-CM

## 2020-09-09 DIAGNOSIS — N13.8 BPH WITH OBSTRUCTION/LOWER URINARY TRACT SYMPTOMS: ICD-10-CM

## 2020-09-09 DIAGNOSIS — E11.9 TYPE 2 DIABETES MELLITUS WITHOUT COMPLICATION, WITHOUT LONG-TERM CURRENT USE OF INSULIN (HCC): ICD-10-CM

## 2020-09-09 DIAGNOSIS — F32.A DEPRESSION, UNSPECIFIED DEPRESSION TYPE: ICD-10-CM

## 2020-09-09 DIAGNOSIS — F02.80 LATE ONSET ALZHEIMER'S DISEASE WITHOUT BEHAVIORAL DISTURBANCE (HCC): ICD-10-CM

## 2020-09-09 DIAGNOSIS — G30.1 LATE ONSET ALZHEIMER'S DISEASE WITHOUT BEHAVIORAL DISTURBANCE (HCC): ICD-10-CM

## 2020-09-09 PROBLEM — Z20.822 COVID-19 RULED OUT: Status: ACTIVE | Noted: 2020-09-08

## 2020-09-09 PROCEDURE — 99213 OFFICE O/P EST LOW 20 MIN: CPT | Performed by: INTERNAL MEDICINE

## 2020-09-09 NOTE — PROGRESS NOTES
AMANDA@  Zach Gutierrez is a 80 y.o. male.     Chief Complaint   Patient presents with   • Alzheimer's Disease     H/P for [facility] Admittance       History of Present Illness   Patient here for follow-up.  He has Alzheimer's disease, diabetes, hyperlipidemia, depression.  Patient also has a DJD and multiple other problems.  Blood pressure has been fine at home.  Patient is able to go to the bathroom on his own.  He is being helped by his wife for showers.  He is not incontinent of urine or bladder.  He feeds himself.  Denies any chest pain or shortness of breath.  Wife try to take him off Namenda and Aricept his dementia got worse he put him back on it.  His memory is getting worse.  He walks but has to be given cues.  Refuses to use cane.  The following portions of the patient's history were reviewed and updated as appropriate: allergies, current medications, past family history, past medical history, past social history, past surgical history and problem list.    Review of Systems   Constitutional: Negative for activity change, appetite change, diaphoresis and unexpected weight gain.   Eyes: Negative for blurred vision and double vision.   Respiratory: Negative.  Negative for shortness of breath.    Cardiovascular: Negative for chest pain, palpitations and leg swelling.   Gastrointestinal: Negative.    Musculoskeletal: Positive for arthralgias.   Skin: Negative for skin lesions.   Neurological: Positive for memory problem. Negative for dizziness, syncope, numbness and headache.   Psychiatric/Behavioral: Positive for agitation and behavioral problems.       No Known Allergies    Current Outpatient Medications on File Prior to Visit   Medication Sig Dispense Refill   • donepezil (ARICEPT) 10 MG tablet Take 10 mg by mouth Every Night.     • fexofenadine (ALLEGRA) 180 MG tablet Take 180 mg by mouth Daily.     • finasteride (PROSCAR) 5 MG tablet Take 5 mg by mouth Every Night.     • memantine  (NAMENDA XR) 28 MG capsule sustained-release 24 hr extended release capsule Take 28 mg by mouth Every Night.     • metFORMIN (GLUCOPHAGE) 500 MG tablet TAKE 2 TABLETS TWICE A  tablet 3   • Multiple Vitamin (MULTIVITAMIN) tablet Take 1 tablet by mouth Daily.     • Probiotic Product (PROBIOTIC DAILY PO) Take  by mouth.     • QUEtiapine (SEROquel) 25 MG tablet Take  by mouth.     • sertraline (ZOLOFT) 50 MG tablet Take 50 mg by mouth Daily.       No current facility-administered medications on file prior to visit.        Family History   Problem Relation Age of Onset   • Colon cancer Neg Hx    • Colon polyps Neg Hx        Past Medical History:   Diagnosis Date   • Arthritis     BACK PAIN   • Dementia (CMS/HCC)    • Diabetes (CMS/HCC)     TYPE 2   • Knee sprain    • ZENA (obstructive sleep apnea)     cpap       Past Surgical History:   Procedure Laterality Date   • COLONOSCOPY N/A 5/19/2017    Procedure: COLONOSCOPY;  Surgeon: Srikanth Payan MD;  Location: [facility] OR;  Service:    • CYSTOSCOPY TRANSURETHRAL RESECTION OF PROSTATE N/A 10/16/2017    Procedure: CYSTOSCOPY TRANSURETHRAL RESECTION OF PROSTATE;  Surgeon: Basil Yang MD;  Location: [facility] OR;  Service:    • HERNIA REPAIR Bilateral     inguinal   • [place] TOTAL KNEE ARTHROPLASTY Right 6/20/2017    Procedure: TOTAL KNEE ARTHROPLASTY hima orthoalign antibiotic cement 7fr hemovac placement;  Surgeon: Bj Thrasher MD;  Location: [facility];  Service: Orthopedics   • SHOULDER SURGERY Right 2007    RCR       Social History     Socioeconomic History   • Marital status:      Spouse name: Not on file   • Number of children: Not on file   • Years of education: Not on file   • Highest education level: Not on file   Tobacco Use   • Smoking status: Never Smoker   • Smokeless tobacco: Never Used   Substance and Sexual Activity   • Alcohol use: Yes     Comment: one jim/week   • Drug use: No   • Sexual activity: Defer       Patient Active  "Problem List   Diagnosis   • Osteoarthritis of right knee   • Status post total right knee replacement   • BPH with obstruction/lower urinary tract symptoms   • Obstructive sleep apnea, adult   • Gastroesophageal reflux disease without esophagitis   • Type 2 diabetes mellitus without complication, without long-term current use of insulin (CMS/[facility])   • Acquired spondylolysis   • Dyslipidemia   • Extremity pain   • Lumbar canal stenosis   • Hematuria   • Mild cognitive impairment with memory loss   • Sleep apnea   • Alzheimer's disease (CMS/[facility])   • Acute diverticulitis   • Depression   • COVID-19 ruled out       /72 (BP Location: Right arm, Patient Position: Sitting, Cuff Size: Adult)   Pulse 69   Temp 97.6 °F (36.4 °C) (Temporal)   Resp 16   Ht 177.8 cm (70\")   Wt 93.9 kg (207 lb)   SpO2 97%   BMI 29.70 kg/m²   Body mass index is 29.7 kg/m².    Objective   Physical Exam   Constitutional: He is oriented to person, place, and time. He appears well-developed.   Neck: Normal range of motion. Neck supple. No JVD present. No tracheal deviation present. No thyromegaly present.   Cardiovascular: Normal rate, regular rhythm and normal heart sounds. Exam reveals no gallop and no friction rub.   No murmur heard.  Pulmonary/Chest: Effort normal and breath sounds normal. No respiratory distress. He has no wheezes.   Abdominal: Soft. Bowel sounds are normal. He exhibits no distension and no mass. There is no tenderness. There is no rebound and no guarding. No hernia.   Musculoskeletal: Normal range of motion.   Lymphadenopathy:     He has no cervical adenopathy.   Neurological: He is alert and oriented to person, place, and time. He displays normal reflexes. No cranial nerve deficit or sensory deficit. He exhibits normal muscle tone. Coordination normal.   Patient knows he is in my office, his knows my name, knows president Melanie is the president.  He does not remember his date of birth or age.   Skin: Skin is warm. "   Psychiatric: He has a normal mood and affect. His behavior is normal.   Nursing note and vitals reviewed.        Assessment/Plan   Zach was seen today for alzheimer's disease.    Diagnoses and all orders for this visit:    Obstructive sleep apnea, adult    Gastroesophageal reflux disease without esophagitis    Type 2 diabetes mellitus without complication, without long-term current use of insulin (CMS/[facility])    Late onset Alzheimer's disease without behavioral disturbance (CMS/[facility])    Primary osteoarthritis of right knee    BPH with obstruction/lower urinary tract symptoms    Dyslipidemia    Depression, unspecified depression type    Patient's dementia is getting worse.  Expected to continue deteriorating.  He is also having trouble ambulating.  He will need physical therapy.  Patient is a good candidate to be in a dementia unit.  Family has been very supportive.  Patient is not incontinent of urine or bowels.  Patient does needs a supervision.

## 2020-09-14 ENCOUNTER — TELEPHONE (OUTPATIENT)
Dept: FAMILY MEDICINE CLINIC | Facility: CLINIC | Age: 80
End: 2020-09-14

## 2020-09-14 NOTE — TELEPHONE ENCOUNTER
Patient went to the [facility] today 9/14/2020. He is in need of a new glucometer and all supplies that go with. Wife requesting this be sent to [facility] in [place] so she can  and take to his new residence please?

## 2020-09-16 DIAGNOSIS — E11.9 TYPE 2 DIABETES MELLITUS WITHOUT COMPLICATION, WITHOUT LONG-TERM CURRENT USE OF INSULIN (HCC): Primary | ICD-10-CM

## 2020-09-16 RX ORDER — BLOOD SUGAR DIAGNOSTIC
STRIP MISCELLANEOUS
Qty: 50 EACH | Refills: 12 | Status: SHIPPED | OUTPATIENT
Start: 2020-09-16 | End: 2021-02-10 | Stop reason: SDUPTHER

## 2020-09-16 RX ORDER — LANCETS
EACH MISCELLANEOUS
Qty: 102 EACH | Refills: 11 | Status: SHIPPED | OUTPATIENT
Start: 2020-09-16 | End: 2021-02-10 | Stop reason: SDUPTHER

## 2020-09-16 RX ORDER — LANCING DEVICE/LANCETS
1 KIT MISCELLANEOUS DAILY
Qty: 1 KIT | Refills: 0 | Status: SHIPPED | OUTPATIENT
Start: 2020-09-16

## 2020-09-16 RX ORDER — BLOOD-GLUCOSE METER
1 EACH MISCELLANEOUS DAILY
Qty: 1 KIT | Refills: 0 | Status: SHIPPED | OUTPATIENT
Start: 2020-09-16

## 2021-02-08 ENCOUNTER — TELEPHONE (OUTPATIENT)
Dept: FAMILY MEDICINE CLINIC | Facility: CLINIC | Age: 81
End: 2021-02-08

## 2021-02-08 NOTE — TELEPHONE ENCOUNTER
Caller: Maia Gutierrez    Relationship to patient: Emergency Contact    Best call back number: [phone]    Patient is needing: PTS WIFE CALLED STATING THAT PTS PHARMACY IS REQUESTING THE FOLLOWING INFORMATION IN ORDER TO FILL PTS Lancets Misc. (Accu-Chek FastClix Lancet) kit AND TESTING STRIPS.     TESTING FREQUENCY  NUMBER OF STRIPS  DIAGNOSIS CODE   BILLING CODE     THIS INFORMATION IS REQUIRED FOR INSURANCE TO COVER MEDICATION     PREFERRED PHARMACY   [facility]/pharmacy #[id number] - [place], [place] - [address] [address] AT [address] [phone] [facility] [phone]   [phone]

## 2021-02-10 DIAGNOSIS — E11.9 TYPE 2 DIABETES MELLITUS WITHOUT COMPLICATION, WITHOUT LONG-TERM CURRENT USE OF INSULIN (HCC): ICD-10-CM

## 2021-02-10 RX ORDER — BLOOD SUGAR DIAGNOSTIC
STRIP MISCELLANEOUS
Qty: 50 EACH | Refills: 12 | Status: SHIPPED | OUTPATIENT
Start: 2021-02-10

## 2021-02-10 RX ORDER — LANCETS
EACH MISCELLANEOUS
Qty: 102 EACH | Refills: 11 | Status: SHIPPED | OUTPATIENT
Start: 2021-02-10 | End: 2022-02-16

## 2021-02-10 NOTE — TELEPHONE ENCOUNTER
Spoke with Pharmacy. With medicare guidelines the request has to be sent by EMR with testing frequency and diagnosis. I submitted request again with those requirements attached.

## 2021-05-07 ENCOUNTER — TELEPHONE (OUTPATIENT)
Dept: FAMILY MEDICINE CLINIC | Facility: CLINIC | Age: 81
End: 2021-05-07

## 2021-05-07 RX ORDER — FINASTERIDE 5 MG/1
5 TABLET, FILM COATED ORAL NIGHTLY
Status: CANCELLED | OUTPATIENT
Start: 2021-05-07

## 2021-05-07 NOTE — TELEPHONE ENCOUNTER
Spoke to patient and she told me to disregard this and she would have the physician at the [facility] fill this until the patient can see his urologist.

## 2021-05-07 NOTE — TELEPHONE ENCOUNTER
Caller: Maia Gutierrez    Relationship: Emergency Contact    Best call back number: [phone]    Medication needed:   Requested Prescriptions     Pending Prescriptions Disp Refills   • finasteride (PROSCAR) 5 MG tablet       Sig: Take 1 tablet by mouth Every Night.       When do you need the refill by: ASAP    What additional details did the patient provide when requesting the medication: OUT OF MEDICATION    Does the patient have less than a 3 day supply:  [x] Yes  [] No    What is the patient's preferred pharmacy: [facility]/PHARMACY #[id number] [facility] [address] [address] AT [address] [address] - [phone]  - [phone]

## 2021-05-12 ENCOUNTER — TELEPHONE (OUTPATIENT)
Dept: FAMILY MEDICINE CLINIC | Facility: CLINIC | Age: 81
End: 2021-05-12

## 2021-05-12 RX ORDER — PANTOPRAZOLE SODIUM 40 MG/1
TABLET, DELAYED RELEASE ORAL
COMMUNITY
Start: 2021-04-10 | End: 2021-05-12 | Stop reason: SDUPTHER

## 2021-05-12 RX ORDER — PANTOPRAZOLE SODIUM 40 MG/1
40 TABLET, DELAYED RELEASE ORAL DAILY
Qty: 90 TABLET | Refills: 0 | Status: SHIPPED | OUTPATIENT
Start: 2021-05-12 | End: 2021-06-11

## 2021-05-12 NOTE — TELEPHONE ENCOUNTER
Provider: DR. HARMAN    Caller: [facility]HOME [facility]ALTH    Reason for Call: HOME HEALTH WAS CALLING TO INFORM THE OFFICE  THAT THE PATIENT HAD A FALL TODAY BUT THERE WAS NO INJURY OR PAIN.

## 2021-05-17 ENCOUNTER — TELEPHONE (OUTPATIENT)
Dept: FAMILY MEDICINE CLINIC | Facility: CLINIC | Age: 81
End: 2021-05-17

## 2021-05-17 NOTE — TELEPHONE ENCOUNTER
PATIENT'S WIFE CALLED AND PATIENT IS CURRENTLY AT [facility] AND HAVING DIFFICULTY SLEEPING. MRS. CHRISTINE WOULD LIKE DR. HARMAN, TO WRITE AN ORDER FOR THE PATIENT TO BE ABLE TO GET 1/2 TO 1 CUP OF EGGNOG WITH LIQUOR AT NIGHT TO HELP WITH HIS SLEEP. THE PATIENT'S WIFE ALSO STATED THAT SHE ALSO HAS STRAWBERRY JUAN WITH LIQUOR THAT SHE WOULD ALSO LIKE PUT ON THE ORDER AS WELL. SHE STATES THAT BOTH DRINKS COME FROM [employer]. [facility] WILL NOT GIVE THE PATIENT THESE DRINKS WITHOUT AN ORDER.    MRS. CHRISTINE IS REQUESTING A CALLBACK AND AN ORDER SENT TO [facility].     MRS. CHRISTINE'S CALLBACK: [phone]

## 2021-06-11 RX ORDER — PANTOPRAZOLE SODIUM 40 MG/1
TABLET, DELAYED RELEASE ORAL
Qty: 90 TABLET | Refills: 0 | Status: SHIPPED | OUTPATIENT
Start: 2021-06-11 | End: 2021-10-11

## 2021-10-11 RX ORDER — PANTOPRAZOLE SODIUM 40 MG/1
TABLET, DELAYED RELEASE ORAL
Qty: 90 TABLET | Refills: 0 | OUTPATIENT
Start: 2021-10-11

## 2021-10-11 RX ORDER — PANTOPRAZOLE SODIUM 40 MG/1
TABLET, DELAYED RELEASE ORAL
Qty: 90 TABLET | Refills: 3 | Status: SHIPPED | OUTPATIENT
Start: 2021-10-11

## 2022-02-08 DIAGNOSIS — E11.9 TYPE 2 DIABETES MELLITUS WITHOUT COMPLICATION, WITHOUT LONG-TERM CURRENT USE OF INSULIN: ICD-10-CM

## 2022-02-09 RX ORDER — BLOOD SUGAR DIAGNOSTIC
STRIP MISCELLANEOUS
Refills: 12 | OUTPATIENT
Start: 2022-02-09

## 2022-02-16 DIAGNOSIS — E11.9 TYPE 2 DIABETES MELLITUS WITHOUT COMPLICATION, WITHOUT LONG-TERM CURRENT USE OF INSULIN: ICD-10-CM

## 2022-02-16 RX ORDER — LANCETS
EACH MISCELLANEOUS
Qty: 102 EACH | Refills: 11 | Status: SHIPPED | OUTPATIENT
Start: 2022-02-16 | End: 2022-08-19 | Stop reason: SDUPTHER

## 2022-08-19 DIAGNOSIS — E11.9 TYPE 2 DIABETES MELLITUS WITHOUT COMPLICATION, WITHOUT LONG-TERM CURRENT USE OF INSULIN: ICD-10-CM

## 2022-08-19 RX ORDER — LANCETS
EACH MISCELLANEOUS
Qty: 102 EACH | Refills: 11 | Status: SHIPPED | OUTPATIENT
Start: 2022-08-19 | End: 2022-09-06 | Stop reason: SDUPTHER

## 2022-09-06 DIAGNOSIS — E11.9 TYPE 2 DIABETES MELLITUS WITHOUT COMPLICATION, WITHOUT LONG-TERM CURRENT USE OF INSULIN: ICD-10-CM

## 2022-09-06 RX ORDER — LANCETS
EACH MISCELLANEOUS
Qty: 102 EACH | Refills: 11 | Status: SHIPPED | OUTPATIENT
Start: 2022-09-06
